# Patient Record
Sex: FEMALE | Race: WHITE | NOT HISPANIC OR LATINO | Employment: FULL TIME | URBAN - METROPOLITAN AREA
[De-identification: names, ages, dates, MRNs, and addresses within clinical notes are randomized per-mention and may not be internally consistent; named-entity substitution may affect disease eponyms.]

---

## 2024-11-11 ENCOUNTER — APPOINTMENT (OUTPATIENT)
Dept: RADIOLOGY | Facility: HOSPITAL | Age: 79
DRG: 038 | End: 2024-11-11
Payer: MEDICARE

## 2024-11-11 ENCOUNTER — HOSPITAL ENCOUNTER (EMERGENCY)
Age: 79
End: 2024-11-11

## 2024-11-11 ENCOUNTER — HOSPITAL ENCOUNTER (INPATIENT)
Facility: HOSPITAL | Age: 79
DRG: 038 | End: 2024-11-11
Attending: EMERGENCY MEDICINE | Admitting: PSYCHIATRY & NEUROLOGY
Payer: MEDICARE

## 2024-11-11 DIAGNOSIS — I63.512 LEFT ACUTE ARTERIAL ISCHEMIC STROKE, MCA (MIDDLE CEREBRAL ARTERY) (MULTI): Primary | ICD-10-CM

## 2024-11-11 DIAGNOSIS — R25.2 SPASTICITY: ICD-10-CM

## 2024-11-11 DIAGNOSIS — I63.512 CEREBROVASCULAR ACCIDENT (CVA) DUE TO OCCLUSION OF LEFT MIDDLE CEREBRAL ARTERY (MULTI): Primary | ICD-10-CM

## 2024-11-11 DIAGNOSIS — I63.512 LEFT ACUTE ARTERIAL ISCHEMIC STROKE, MCA (MIDDLE CEREBRAL ARTERY) (MULTI): ICD-10-CM

## 2024-11-11 DIAGNOSIS — I10 PRIMARY HYPERTENSION: ICD-10-CM

## 2024-11-11 LAB
ALBUMIN SERPL BCP-MCNC: 2.7 G/DL (ref 3.4–5)
ALP SERPL-CCNC: 187 U/L (ref 33–136)
ALT SERPL W P-5'-P-CCNC: 64 U/L (ref 7–45)
ANION GAP BLDV CALCULATED.4IONS-SCNC: 10 MMOL/L (ref 10–25)
ANION GAP SERPL CALC-SCNC: 24 MMOL/L (ref 10–20)
AST SERPL W P-5'-P-CCNC: 53 U/L (ref 9–39)
BASE EXCESS BLDV CALC-SCNC: 4.3 MMOL/L (ref -2–3)
BASOPHILS # BLD AUTO: 0.05 X10*3/UL (ref 0–0.1)
BASOPHILS NFR BLD AUTO: 0.3 %
BILIRUB DIRECT SERPL-MCNC: 1.8 MG/DL (ref 0–0.3)
BILIRUB SERPL-MCNC: 3.7 MG/DL (ref 0–1.2)
BNP SERPL-MCNC: 188 PG/ML (ref 0–99)
BODY TEMPERATURE: 37 DEGREES CELSIUS
BUN SERPL-MCNC: 67 MG/DL (ref 6–23)
CA-I BLDV-SCNC: 1.13 MMOL/L (ref 1.1–1.33)
CALCIUM SERPL-MCNC: 8.9 MG/DL (ref 8.6–10.6)
CHLORIDE BLDV-SCNC: 100 MMOL/L (ref 98–107)
CHLORIDE SERPL-SCNC: 97 MMOL/L (ref 98–107)
CHOLEST SERPL-MCNC: 132 MG/DL (ref 0–199)
CHOLESTEROL/HDL RATIO: 15.2
CO2 SERPL-SCNC: 20 MMOL/L (ref 21–32)
CREAT SERPL-MCNC: 3.8 MG/DL (ref 0.5–1.05)
EGFRCR SERPLBLD CKD-EPI 2021: 11 ML/MIN/1.73M*2
EOSINOPHIL # BLD AUTO: 0.01 X10*3/UL (ref 0–0.4)
EOSINOPHIL NFR BLD AUTO: 0.1 %
ERYTHROCYTE [DISTWIDTH] IN BLOOD BY AUTOMATED COUNT: 13.6 % (ref 11.5–14.5)
EST. AVERAGE GLUCOSE BLD GHB EST-MCNC: 120 MG/DL
GLUCOSE BLD MANUAL STRIP-MCNC: 130 MG/DL (ref 74–99)
GLUCOSE BLDV-MCNC: 147 MG/DL (ref 74–99)
GLUCOSE SERPL-MCNC: 111 MG/DL (ref 74–99)
HBA1C MFR BLD: 5.8 %
HCO3 BLDV-SCNC: 29.8 MMOL/L (ref 22–26)
HCT VFR BLD AUTO: 40.2 % (ref 36–46)
HCT VFR BLD EST: 41 % (ref 36–46)
HDLC SERPL-MCNC: 8.7 MG/DL
HGB BLD-MCNC: 12.9 G/DL (ref 12–16)
HGB BLDV-MCNC: 13.7 G/DL (ref 12–16)
IMM GRANULOCYTES # BLD AUTO: 0.08 X10*3/UL (ref 0–0.5)
IMM GRANULOCYTES NFR BLD AUTO: 0.5 % (ref 0–0.9)
LACTATE BLDV-SCNC: 2.6 MMOL/L (ref 0.4–2)
LDLC SERPL CALC-MCNC: 92 MG/DL
LYMPHOCYTES # BLD AUTO: 0.77 X10*3/UL (ref 0.8–3)
LYMPHOCYTES NFR BLD AUTO: 5.2 %
MAGNESIUM SERPL-MCNC: 2.16 MG/DL (ref 1.6–2.4)
MCH RBC QN AUTO: 29.3 PG (ref 26–34)
MCHC RBC AUTO-ENTMCNC: 32.1 G/DL (ref 32–36)
MCV RBC AUTO: 91 FL (ref 80–100)
MONOCYTES # BLD AUTO: 0.77 X10*3/UL (ref 0.05–0.8)
MONOCYTES NFR BLD AUTO: 5.2 %
NEUTROPHILS # BLD AUTO: 13.03 X10*3/UL (ref 1.6–5.5)
NEUTROPHILS NFR BLD AUTO: 88.7 %
NON HDL CHOLESTEROL: 123 MG/DL (ref 0–149)
NRBC BLD-RTO: 0 /100 WBCS (ref 0–0)
OXYHGB MFR BLDV: 60.1 % (ref 45–75)
PCO2 BLDV: 47 MM HG (ref 41–51)
PH BLDV: 7.41 PH (ref 7.33–7.43)
PHOSPHATE SERPL-MCNC: 6.7 MG/DL (ref 2.5–4.9)
PLATELET # BLD AUTO: 167 X10*3/UL (ref 150–450)
PO2 BLDV: 33 MM HG (ref 35–45)
POTASSIUM BLDV-SCNC: 2.7 MMOL/L (ref 3.5–5.3)
POTASSIUM SERPL-SCNC: 3.7 MMOL/L (ref 3.5–5.3)
PROT SERPL-MCNC: 6.2 G/DL (ref 6.4–8.2)
RBC # BLD AUTO: 4.41 X10*6/UL (ref 4–5.2)
SAO2 % BLDV: 61 % (ref 45–75)
SODIUM BLDV-SCNC: 137 MMOL/L (ref 136–145)
SODIUM SERPL-SCNC: 137 MMOL/L (ref 136–145)
TRIGL SERPL-MCNC: 155 MG/DL (ref 0–149)
VLDL: 31 MG/DL (ref 0–40)
WBC # BLD AUTO: 14.7 X10*3/UL (ref 4.4–11.3)

## 2024-11-11 PROCEDURE — C1887 CATHETER, GUIDING: HCPCS | Performed by: NEUROLOGICAL SURGERY

## 2024-11-11 PROCEDURE — 84100 ASSAY OF PHOSPHORUS: CPT

## 2024-11-11 PROCEDURE — 99222 1ST HOSP IP/OBS MODERATE 55: CPT | Performed by: SURGERY

## 2024-11-11 PROCEDURE — 96374 THER/PROPH/DIAG INJ IV PUSH: CPT | Mod: 59

## 2024-11-11 PROCEDURE — 99152 MOD SED SAME PHYS/QHP 5/>YRS: CPT | Performed by: NEUROLOGICAL SURGERY

## 2024-11-11 PROCEDURE — B31P1ZZ FLUOROSCOPY OF THORACO-ABDOMINAL AORTA USING LOW OSMOLAR CONTRAST: ICD-10-PCS | Performed by: NEUROLOGICAL SURGERY

## 2024-11-11 PROCEDURE — 75625 CONTRAST EXAM ABDOMINL AORTA: CPT | Performed by: NEUROLOGICAL SURGERY

## 2024-11-11 PROCEDURE — C1725 CATH, TRANSLUMIN NON-LASER: HCPCS | Performed by: NEUROLOGICAL SURGERY

## 2024-11-11 PROCEDURE — 70450 CT HEAD/BRAIN W/O DYE: CPT | Performed by: RADIOLOGY

## 2024-11-11 PROCEDURE — 36224 PLACE CATH CAROTD ART: CPT | Performed by: NEUROLOGICAL SURGERY

## 2024-11-11 PROCEDURE — B3171ZZ FLUOROSCOPY OF LEFT INTERNAL CAROTID ARTERY USING LOW OSMOLAR CONTRAST: ICD-10-PCS | Performed by: NEUROLOGICAL SURGERY

## 2024-11-11 PROCEDURE — 99291 CRITICAL CARE FIRST HOUR: CPT

## 2024-11-11 PROCEDURE — B31R1ZZ FLUOROSCOPY OF INTRACRANIAL ARTERIES USING LOW OSMOLAR CONTRAST: ICD-10-PCS | Performed by: NEUROLOGICAL SURGERY

## 2024-11-11 PROCEDURE — 36415 COLL VENOUS BLD VENIPUNCTURE: CPT

## 2024-11-11 PROCEDURE — 36620 INSERTION CATHETER ARTERY: CPT

## 2024-11-11 PROCEDURE — 2550000001 HC RX 255 CONTRASTS: Performed by: NEUROLOGICAL SURGERY

## 2024-11-11 PROCEDURE — 83880 ASSAY OF NATRIURETIC PEPTIDE: CPT

## 2024-11-11 PROCEDURE — 99285 EMERGENCY DEPT VISIT HI MDM: CPT | Mod: 25

## 2024-11-11 PROCEDURE — 96375 TX/PRO/DX INJ NEW DRUG ADDON: CPT | Mod: 59

## 2024-11-11 PROCEDURE — 2500000005 HC RX 250 GENERAL PHARMACY W/O HCPCS: Performed by: REGISTERED NURSE

## 2024-11-11 PROCEDURE — C1760 CLOSURE DEV, VASC: HCPCS | Performed by: NEUROLOGICAL SURGERY

## 2024-11-11 PROCEDURE — 84132 ASSAY OF SERUM POTASSIUM: CPT

## 2024-11-11 PROCEDURE — 80061 LIPID PANEL: CPT

## 2024-11-11 PROCEDURE — 82947 ASSAY GLUCOSE BLOOD QUANT: CPT

## 2024-11-11 PROCEDURE — 2500000004 HC RX 250 GENERAL PHARMACY W/ HCPCS (ALT 636 FOR OP/ED)

## 2024-11-11 PROCEDURE — 70450 CT HEAD/BRAIN W/O DYE: CPT

## 2024-11-11 PROCEDURE — 99285 EMERGENCY DEPT VISIT HI MDM: CPT | Performed by: EMERGENCY MEDICINE

## 2024-11-11 PROCEDURE — 96373 THER/PROPH/DIAG INJ IA: CPT | Performed by: NEUROLOGICAL SURGERY

## 2024-11-11 PROCEDURE — C1769 GUIDE WIRE: HCPCS | Performed by: NEUROLOGICAL SURGERY

## 2024-11-11 PROCEDURE — 2720000007 HC OR 272 NO HCPCS: Performed by: NEUROLOGICAL SURGERY

## 2024-11-11 PROCEDURE — 85025 COMPLETE CBC W/AUTO DIFF WBC: CPT

## 2024-11-11 PROCEDURE — 2500000004 HC RX 250 GENERAL PHARMACY W/ HCPCS (ALT 636 FOR OP/ED): Performed by: REGISTERED NURSE

## 2024-11-11 PROCEDURE — 2500000004 HC RX 250 GENERAL PHARMACY W/ HCPCS (ALT 636 FOR OP/ED): Performed by: NEUROLOGICAL SURGERY

## 2024-11-11 PROCEDURE — 99153 MOD SED SAME PHYS/QHP EA: CPT | Performed by: NEUROLOGICAL SURGERY

## 2024-11-11 PROCEDURE — 75710 ARTERY X-RAYS ARM/LEG: CPT | Mod: RT | Performed by: NEUROLOGICAL SURGERY

## 2024-11-11 PROCEDURE — 82248 BILIRUBIN DIRECT: CPT

## 2024-11-11 PROCEDURE — C1894 INTRO/SHEATH, NON-LASER: HCPCS | Performed by: NEUROLOGICAL SURGERY

## 2024-11-11 PROCEDURE — 83036 HEMOGLOBIN GLYCOSYLATED A1C: CPT

## 2024-11-11 PROCEDURE — 36200 PLACE CATHETER IN AORTA: CPT | Performed by: NEUROLOGICAL SURGERY

## 2024-11-11 PROCEDURE — 83735 ASSAY OF MAGNESIUM: CPT

## 2024-11-11 PROCEDURE — 2020000001 HC ICU ROOM DAILY

## 2024-11-11 PROCEDURE — 99223 1ST HOSP IP/OBS HIGH 75: CPT

## 2024-11-11 RX ORDER — HYDRALAZINE HYDROCHLORIDE 10 MG/1
25 TABLET, FILM COATED ORAL EVERY 6 HOURS PRN
Status: DISCONTINUED | OUTPATIENT
Start: 2024-11-13 | End: 2024-11-11

## 2024-11-11 RX ORDER — SODIUM CHLORIDE 9 MG/ML
100 INJECTION, SOLUTION INTRAVENOUS CONTINUOUS
Status: SHIPPED | OUTPATIENT
Start: 2024-11-11 | End: 2024-11-12

## 2024-11-11 RX ORDER — IPRATROPIUM BROMIDE AND ALBUTEROL SULFATE 2.5; .5 MG/3ML; MG/3ML
3 SOLUTION RESPIRATORY (INHALATION) 4 TIMES DAILY PRN
COMMUNITY

## 2024-11-11 RX ORDER — LABETALOL HYDROCHLORIDE 5 MG/ML
10 INJECTION, SOLUTION INTRAVENOUS EVERY 10 MIN PRN
Status: DISCONTINUED | OUTPATIENT
Start: 2024-11-11 | End: 2024-11-11

## 2024-11-11 RX ORDER — ACETAMINOPHEN 325 MG/1
650 TABLET ORAL EVERY 4 HOURS PRN
Status: DISCONTINUED | OUTPATIENT
Start: 2024-11-11 | End: 2024-11-13

## 2024-11-11 RX ORDER — PHENYLEPHRINE 10 MG/250 ML(40 MCG/ML)IN 0.9 % SOD.CHLORIDE INTRAVENOUS
0-2 CONTINUOUS
Status: DISCONTINUED | OUTPATIENT
Start: 2024-11-11 | End: 2024-11-12

## 2024-11-11 RX ORDER — BUSPIRONE HYDROCHLORIDE 10 MG/1
10 TABLET ORAL 3 TIMES DAILY
COMMUNITY

## 2024-11-11 RX ORDER — PHENYLEPHRINE HCL IN 0.9% NACL 0.4MG/10ML
80 SYRINGE (ML) INTRAVENOUS ONCE
Status: COMPLETED | OUTPATIENT
Start: 2024-11-11 | End: 2024-11-11

## 2024-11-11 RX ORDER — ERGOCALCIFEROL 1.25 MG/1
1.25 CAPSULE ORAL WEEKLY
COMMUNITY

## 2024-11-11 RX ORDER — POLYETHYLENE GLYCOL 3350 17 G/17G
17 POWDER, FOR SOLUTION ORAL DAILY
Status: DISCONTINUED | OUTPATIENT
Start: 2024-11-11 | End: 2024-11-11

## 2024-11-11 RX ORDER — AZITHROMYCIN 250 MG/1
250 TABLET, FILM COATED ORAL
COMMUNITY

## 2024-11-11 RX ORDER — ATORVASTATIN CALCIUM 10 MG/1
40 TABLET, FILM COATED ORAL NIGHTLY
Status: DISCONTINUED | OUTPATIENT
Start: 2024-11-11 | End: 2024-11-11

## 2024-11-11 RX ORDER — LABETALOL HYDROCHLORIDE 5 MG/ML
10 INJECTION, SOLUTION INTRAVENOUS EVERY 10 MIN PRN
Status: DISCONTINUED | OUTPATIENT
Start: 2024-11-11 | End: 2024-11-12

## 2024-11-11 RX ORDER — MIDAZOLAM HYDROCHLORIDE 1 MG/ML
INJECTION INTRAMUSCULAR; INTRAVENOUS
Status: COMPLETED | OUTPATIENT
Start: 2024-11-11 | End: 2024-11-11

## 2024-11-11 RX ORDER — FENTANYL CITRATE 50 UG/ML
INJECTION, SOLUTION INTRAMUSCULAR; INTRAVENOUS
Status: COMPLETED | OUTPATIENT
Start: 2024-11-11 | End: 2024-11-11

## 2024-11-11 RX ORDER — LEVOTHYROXINE SODIUM 25 UG/1
25 TABLET ORAL DAILY
COMMUNITY

## 2024-11-11 RX ORDER — PANTOPRAZOLE SODIUM 40 MG/1
40 TABLET, DELAYED RELEASE ORAL
COMMUNITY

## 2024-11-11 RX ORDER — ATORVASTATIN CALCIUM 10 MG/1
80 TABLET, FILM COATED ORAL NIGHTLY
Status: DISCONTINUED | OUTPATIENT
Start: 2024-11-11 | End: 2024-11-11

## 2024-11-11 RX ORDER — ONDANSETRON HYDROCHLORIDE 2 MG/ML
4 INJECTION, SOLUTION INTRAVENOUS EVERY 8 HOURS PRN
Status: DISCONTINUED | OUTPATIENT
Start: 2024-11-11 | End: 2024-11-22 | Stop reason: HOSPADM

## 2024-11-11 RX ORDER — DEXTROMETHORPHAN HYDROBROMIDE, GUAIFENESIN 5; 100 MG/5ML; MG/5ML
650 LIQUID ORAL EVERY 12 HOURS PRN
COMMUNITY

## 2024-11-11 RX ORDER — ACETAMINOPHEN 160 MG/5ML
650 SOLUTION ORAL EVERY 4 HOURS PRN
Status: DISCONTINUED | OUTPATIENT
Start: 2024-11-11 | End: 2024-11-13

## 2024-11-11 RX ORDER — INSULIN LISPRO 100 [IU]/ML
0-5 INJECTION, SOLUTION INTRAVENOUS; SUBCUTANEOUS
Status: DISCONTINUED | OUTPATIENT
Start: 2024-11-11 | End: 2024-11-11

## 2024-11-11 RX ORDER — CLOPIDOGREL BISULFATE 75 MG/1
75 TABLET ORAL DAILY
COMMUNITY
End: 2024-11-22 | Stop reason: HOSPADM

## 2024-11-11 RX ORDER — DEXTROSE 50 % IN WATER (D50W) INTRAVENOUS SYRINGE
12.5
Status: DISCONTINUED | OUTPATIENT
Start: 2024-11-11 | End: 2024-11-11

## 2024-11-11 RX ORDER — SENNOSIDES 8.6 MG/1
2 TABLET ORAL 2 TIMES DAILY
Status: DISCONTINUED | OUTPATIENT
Start: 2024-11-11 | End: 2024-11-22 | Stop reason: HOSPADM

## 2024-11-11 RX ORDER — ONDANSETRON 4 MG/1
4 TABLET, ORALLY DISINTEGRATING ORAL EVERY 8 HOURS PRN
Status: DISCONTINUED | OUTPATIENT
Start: 2024-11-11 | End: 2024-11-22 | Stop reason: HOSPADM

## 2024-11-11 RX ORDER — SODIUM CHLORIDE 9 MG/ML
50 INJECTION, SOLUTION INTRAVENOUS CONTINUOUS
Status: DISCONTINUED | OUTPATIENT
Start: 2024-11-11 | End: 2024-11-12

## 2024-11-11 RX ORDER — ATORVASTATIN CALCIUM 80 MG/1
80 TABLET, FILM COATED ORAL NIGHTLY
Status: DISCONTINUED | OUTPATIENT
Start: 2024-11-11 | End: 2024-11-22 | Stop reason: HOSPADM

## 2024-11-11 RX ORDER — DEXTROSE 50 % IN WATER (D50W) INTRAVENOUS SYRINGE
12.5
Status: DISCONTINUED | OUTPATIENT
Start: 2024-11-11 | End: 2024-11-22 | Stop reason: HOSPADM

## 2024-11-11 RX ORDER — ACETAMINOPHEN 10 MG/ML
15 INJECTION, SOLUTION INTRAVENOUS ONCE
Status: COMPLETED | OUTPATIENT
Start: 2024-11-11 | End: 2024-11-11

## 2024-11-11 RX ORDER — INSULIN LISPRO 100 [IU]/ML
0-5 INJECTION, SOLUTION INTRAVENOUS; SUBCUTANEOUS
Status: DISCONTINUED | OUTPATIENT
Start: 2024-11-12 | End: 2024-11-22 | Stop reason: HOSPADM

## 2024-11-11 RX ORDER — DEXTROSE 50 % IN WATER (D50W) INTRAVENOUS SYRINGE
25
Status: DISCONTINUED | OUTPATIENT
Start: 2024-11-11 | End: 2024-11-22 | Stop reason: HOSPADM

## 2024-11-11 RX ORDER — HYDRALAZINE HYDROCHLORIDE 50 MG/1
25 TABLET, FILM COATED ORAL EVERY 6 HOURS PRN
Status: DISCONTINUED | OUTPATIENT
Start: 2024-11-13 | End: 2024-11-11

## 2024-11-11 RX ORDER — PHENYLEPHRINE HCL IN 0.9% NACL 0.4MG/10ML
SYRINGE (ML) INTRAVENOUS
Status: COMPLETED
Start: 2024-11-11 | End: 2024-11-11

## 2024-11-11 RX ORDER — ATORVASTATIN CALCIUM 80 MG/1
80 TABLET, FILM COATED ORAL DAILY
COMMUNITY

## 2024-11-11 RX ORDER — HYDRALAZINE HYDROCHLORIDE 50 MG/1
25 TABLET, FILM COATED ORAL EVERY 6 HOURS PRN
Status: DISCONTINUED | OUTPATIENT
Start: 2024-11-13 | End: 2024-11-12

## 2024-11-11 RX ORDER — HYDRALAZINE HYDROCHLORIDE 20 MG/ML
10 INJECTION INTRAMUSCULAR; INTRAVENOUS
Status: DISCONTINUED | OUTPATIENT
Start: 2024-11-11 | End: 2024-11-12

## 2024-11-11 RX ORDER — FUROSEMIDE 20 MG/1
20 TABLET ORAL DAILY
COMMUNITY

## 2024-11-11 RX ORDER — CLONIDINE HYDROCHLORIDE 0.1 MG/1
0.1 TABLET ORAL 2 TIMES DAILY PRN
COMMUNITY

## 2024-11-11 RX ORDER — ASPIRIN 81 MG/1
81 TABLET ORAL DAILY
COMMUNITY

## 2024-11-11 RX ORDER — POLYETHYLENE GLYCOL 3350 17 G/17G
17 POWDER, FOR SOLUTION ORAL DAILY
Status: DISCONTINUED | OUTPATIENT
Start: 2024-11-11 | End: 2024-11-22 | Stop reason: HOSPADM

## 2024-11-11 RX ORDER — LIDOCAINE 560 MG/1
1 PATCH PERCUTANEOUS; TOPICAL; TRANSDERMAL DAILY
Status: DISCONTINUED | OUTPATIENT
Start: 2024-11-11 | End: 2024-11-22 | Stop reason: HOSPADM

## 2024-11-11 RX ORDER — DEXTROSE 50 % IN WATER (D50W) INTRAVENOUS SYRINGE
25
Status: DISCONTINUED | OUTPATIENT
Start: 2024-11-11 | End: 2024-11-11

## 2024-11-11 RX ORDER — TALC
3 POWDER (GRAM) TOPICAL NIGHTLY
COMMUNITY

## 2024-11-11 RX ORDER — HYDRALAZINE HYDROCHLORIDE 20 MG/ML
10 INJECTION INTRAMUSCULAR; INTRAVENOUS
Status: DISCONTINUED | OUTPATIENT
Start: 2024-11-11 | End: 2024-11-11

## 2024-11-11 RX ORDER — PHENYLEPHRINE HCL IN 0.9% NACL 0.4MG/10ML
40 SYRINGE (ML) INTRAVENOUS ONCE
Status: COMPLETED | OUTPATIENT
Start: 2024-11-11 | End: 2024-11-11

## 2024-11-11 RX ORDER — FLUTICASONE FUROATE, UMECLIDINIUM BROMIDE AND VILANTEROL TRIFENATATE 100; 62.5; 25 UG/1; UG/1; UG/1
1 POWDER RESPIRATORY (INHALATION) DAILY
COMMUNITY

## 2024-11-11 RX ADMIN — SODIUM CHLORIDE 50 ML/HR: 9 INJECTION, SOLUTION INTRAVENOUS at 22:01

## 2024-11-11 RX ADMIN — Medication 40 MCG: at 22:39

## 2024-11-11 RX ADMIN — MIDAZOLAM HYDROCHLORIDE 1 MG: 1 INJECTION, SOLUTION INTRAMUSCULAR; INTRAVENOUS at 14:15

## 2024-11-11 RX ADMIN — LIDOCAINE 1 PATCH: 4 PATCH TOPICAL at 21:10

## 2024-11-11 RX ADMIN — MIDAZOLAM HYDROCHLORIDE 1 MG: 1 INJECTION, SOLUTION INTRAMUSCULAR; INTRAVENOUS at 15:31

## 2024-11-11 RX ADMIN — FENTANYL CITRATE 50 MCG: 50 INJECTION, SOLUTION INTRAMUSCULAR; INTRAVENOUS at 15:31

## 2024-11-11 RX ADMIN — FENTANYL CITRATE 50 MCG: 50 INJECTION, SOLUTION INTRAMUSCULAR; INTRAVENOUS at 16:06

## 2024-11-11 RX ADMIN — FENTANYL CITRATE 50 MCG: 50 INJECTION, SOLUTION INTRAMUSCULAR; INTRAVENOUS at 15:10

## 2024-11-11 RX ADMIN — LABETALOL HYDROCHLORIDE 10 MG: 5 INJECTION, SOLUTION INTRAVENOUS at 18:36

## 2024-11-11 RX ADMIN — SODIUM CHLORIDE 1000 ML: 9 INJECTION, SOLUTION INTRAVENOUS at 22:37

## 2024-11-11 RX ADMIN — FENTANYL CITRATE 50 MCG: 50 INJECTION, SOLUTION INTRAMUSCULAR; INTRAVENOUS at 14:15

## 2024-11-11 RX ADMIN — Medication 80 MCG: at 23:45

## 2024-11-11 RX ADMIN — IOHEXOL 100 ML: 350 INJECTION, SOLUTION INTRAVENOUS at 14:28

## 2024-11-11 RX ADMIN — MIDAZOLAM HYDROCHLORIDE 1 MG: 1 INJECTION, SOLUTION INTRAMUSCULAR; INTRAVENOUS at 15:10

## 2024-11-11 RX ADMIN — ACETAMINOPHEN 600 MG: 1000 INJECTION INTRAVENOUS at 22:01

## 2024-11-11 ASSESSMENT — PAIN SCALES - GENERAL
PAINLEVEL_OUTOF10: 0 - NO PAIN
PAINLEVEL_OUTOF10: 4
PAINLEVEL_OUTOF10: 0 - NO PAIN

## 2024-11-11 ASSESSMENT — PAIN DESCRIPTION - DESCRIPTORS: DESCRIPTORS: SORE

## 2024-11-11 ASSESSMENT — PAIN - FUNCTIONAL ASSESSMENT
PAIN_FUNCTIONAL_ASSESSMENT: 0-10
PAIN_FUNCTIONAL_ASSESSMENT: UNABLE TO SELF-REPORT
PAIN_FUNCTIONAL_ASSESSMENT: 0-10
PAIN_FUNCTIONAL_ASSESSMENT: 0-10

## 2024-11-11 NOTE — PROGRESS NOTES
[facility]  NEUROSCIENCE INTENSIVE CARE UNIT  DAILY PROGRESS NOTE       Patient Name: Zulma Rao   MRN: [id number]     Admit Date: 2024     : 1945 AGE: 79 y.o. GENDER: female        Subjective    79 y.o. female with PMH prior R sided stroke with left sided deficit (per personal review of imaging, L sided posterior limb of internal capsule) 2024, bilateral carotid artery stenosis S/P R CEA , HFpEF, NSTEMI, T2DM, HT, DLD, hypothyroidism, COPD, HCC S/P partial hepatectomy (), anxiety, b/l cataracts.  Admitted 2024 with Cerebrovascular accident (CVA) due to occlusion of left middle cerebral artery (Multi) after transferred for mechanical thrombectomy.     History obtained from  Mynor, who reported that pt was home for lunch today, last known well was 12pm. She was standing in the kitchen, and suddenly started having whole body shaking. He was able to catch her before falling to ground. Noticed that she had left sided facial droop that was new and pt was looking to the left, not speaking or communicating with him. He called 911 and EMS brought her to the [facility] ED. NIH on arrival was 20. BP on arrival was:     CTH completed showing dense left MCA sign. CTA head and neck completed showing proximal left M1 occlusion and what appears to be chronic right ICA occlusion. She was given TNK at 12:55. Life flighted to [facility] for MT pamelaal. NIH on arrival was 21 (breakdown below), BP on arrival was 130/93 with BG 93. Mechanical thrombectomy completed and post NIH score was:  Admitted to NSU.      Pt's  reports history of stroke in July of this year for which she originally had left sided weakness and facial droop that completely resolved and was back to her normal baseline prior to stroke. Was able to ambulate independently, but then fell in September and broke her right hip requiring surgical fixation. She was doing well, walking with walker for long distance but  recently has been ambulating independently.  reported no history of arrhythmias and reports pt was taking daily plavix.     NIHSS Pre Thrombectomy  Level of consciousness: 0 = Alert  LOC Question: 2 = Neither correct  LOC Commands: 2 = Obeys neither  Best Gaze: 2 = Forced deviation  Visual Field: 0 = No visual loss  Facial Paresis: 1 = Minor paresis  LUE: 1 = Drift  RUE: 3 = No effort vs gravity  LLE: 1 = Drift  RLE: 3 = No effort vs gravity  Dysarthria: 2 = Unintelligible  Best Language: 3 = Mute  Limb Ataxia: 0 = Absent  Sensory: 1 = Partial loss  Neglect: 0 = None     NIHSS Score: 21        NIHSS Post Thrombectomy:  NIHSS  Level of consciousness: 1 = Drowsy  LOC Question: 2 = Neither correct  LOC Commands: 2 = Obeys neither  Best Gaze: 2 = Forced deviation  Visual Field: 0 = No visual loss  Facial Paresis: 1 = Minor paresis  LUE: 1 = Drift  RUE: 2 = Effort vs gravity  LLE: 2 = Effort vs gravity  RLE: 2 = Effort vs gravity  Dysarthria: 2 = Unintelligible  Best Language: 3 = Mute  Limb Ataxia: 0 = Absent  Sensory: 0 = Absent  Neglect: 0 = None     NIHSS Score: 20    Significant Events:  - 11/11 TNK and spontaneous recanalization on angiogram TICI 3    Interval Events:      Objective   VITALS (24H):  Temp:  [36.3 °C (97.3 °F)] 36.3 °C (97.3 °F)  Heart Rate:  [] 78  Resp:  [13-21] 17  BP: ()/() 159/123  INTAKE/OUTPUT:  Intake/Output Summary (Last 24 hours) at 11/11/2024 2013  Last data filed at 11/11/2024 1900  Gross per 24 hour   Intake --   Output 251 ml   Net -251 ml     VENT SETTINGS:        PHYSICAL EXAM:  NEURO:  - Alert, oriented x2, follows simple commands  - EOS, PERRL, EOMI, VFF  - SANTOS 4/5 strength  CV:  - RRR on telemetry, NSR  - Unable to palpate or doppler pulse in both legs  RESP:  - No signs of resp distress  - Oxygen: Nasal cannula  :  - External catheter in place  GI:  - Abdomen NT/ND, soft  SKIN:  - Intact    11/11/24 @ 5:45pm  NIHSS:   - Level of consciousness: 0 =  Alert  - LOC Question: 2 = Neither correct  - LOC Commands: 0 = Obeys both  - Best Gaze: 0 = Normal  - Visual Field: 0 = No visual loss  - Facial Paresis: 0 = Normal  - LUE: 0 = No drift; 10 sec  - RUE: 0 = No drift; 10 sec  - LLE: 0 = No drift; 5 sec  - RLE: 0 = No drift; 5 sec  - Limb Ataxia: 0 = Absent  - Sensory: 0 = Absent  - Best Language: 1 = Mild-moderate aphasia  - Dysarthria: 1 = Mild-moderate  - Neglect: 0 = None  TOTAL: 4         MEDICATIONS:  Scheduled: PRN: Continuous:   acetaminophen, 15 mg/kg, intravenous, Once  atorvastatin, 80 mg, oral, Nightly  [START ON 11/12/2024] insulin lispro, 0-5 Units, subcutaneous, TID AC  lidocaine, 1 patch, transdermal, Daily  polyethylene glycol, 17 g, oral, Daily  sennosides, 2 tablet, oral, BID     PRN medications: acetaminophen **OR** acetaminophen, dextrose, dextrose, glucagon, glucagon, hydrALAZINE **FOLLOWED BY** [START ON 11/13/2024] hydrALAZINE, labetaloL, ondansetron ODT **OR** ondansetron, oxygen       LAB RESULTS:  Results from last 72 hours   Lab Units 11/11/24  1757   GLUCOSE mg/dL 111*   SODIUM mmol/L 137   POTASSIUM mmol/L 3.7   CHLORIDE mmol/L 97*   CO2 mmol/L 20*   ANION GAP mmol/L 24*   BUN mg/dL 67*   CREATININE mg/dL 3.80*   EGFR mL/min/1.73m*2 11*   CALCIUM mg/dL 8.9   PHOSPHORUS mg/dL 6.7*   ALBUMIN g/dL 2.7*   MAGNESIUM mg/dL 2.16      Results from last 72 hours   Lab Units 11/11/24  1757   WBC AUTO x10*3/uL 14.7*   NRBC AUTO /100 WBCs 0.0   RBC AUTO x10*6/uL 4.41   HEMOGLOBIN g/dL 12.9   HEMATOCRIT % 40.2   MCV fL 91   MCH pg 29.3   MCHC g/dL 32.1   RDW % 13.6   PLATELETS AUTO x10*3/uL 167   NEUTROS PCT AUTO % 88.7   IG PCT AUTO % 0.5   LYMPHS PCT AUTO % 5.2   MONOS PCT AUTO % 5.2   EOS PCT AUTO % 0.1   BASOS PCT AUTO % 0.3   NEUTROS ABS x10*3/uL 13.03*   IG AUTO x10*3/uL 0.08   LYMPHS ABS AUTO x10*3/uL 0.77*   MONOS ABS AUTO x10*3/uL 0.77   EOS ABS AUTO x10*3/uL 0.01   BASOS ABS AUTO x10*3/uL 0.05      Results from last 72 hours   Lab Units  11/11/24 1757   ALK PHOS U/L 187*   BILIRUBIN TOTAL mg/dL 3.7*   BILIRUBIN DIRECT mg/dL 1.8*   PROTEIN TOTAL g/dL 6.2*   ALT U/L 64*   AST U/L 53*   ALBUMIN g/dL 2.7*      IMAGING RESULTS:  Reviewed OSH imaging on PACS  Assessment/Plan    NEURO:  # L MCA stroke S/P TNK, Spontaneous recanalization TICI 3  # R sided stroke with left sided deficit (per personal review of imaging L sided posterior limb of internal capsule) 7/2024  # Bilateral carotid artery stenosis S/P R CEA 2011  Assessment:  - Neurologically: see exam above  - NIHSS on admission   Plan:  - NSU  - Neuro Checks: Q1H  - Sedation: None  - Pain: acetaminophen PRN, Q6H (hold until pass SLP)  - Nausea: ondansetron and None  - PT/OT/SLP  - c/w home Atorvastatin 80mg  - Hold home plavix 75mg daily     CARDIOVASCULAR:  # Loss of pulse in both feet  # NSTEMI  # HFpEF  # HT  # DLD  Assessment:  - EKG: pending  - ECHO (9/5/24 at OSH): LVEF 72, RVSP unable to assess  Plan:  - Continue to monitor on telemetry  - BP goal: BP < 180/105    --> PRN: Labetalol and Hydralazine   - TTE pending  - c/w monitor doppler    RESPIRATORY:  # COPD  Assessment:  - PFT (10/05/2023 at OSH): FEV1 50% predicted. FEV1/FVC 69%. No TLC obtained. DLCO 40%   Plan:  - Continuous pulse oximetry   - O2 PRN to maintain SpO2 > 88%, wean as tolerated  - Incentive spirometry while awake    RENAL/:  #JOSE  Assessment:  - Baseline BUN/Cr: 12/0.63  Results from last 72 hours   Lab Units 11/11/24 1757   BUN mg/dL 67*   CREATININE mg/dL 3.80*       Plan:  - Monitor with daily RFP  - Maintain external urinary diversion device for strict I&O    FEN/GI:  # HCC S/P partial hepatectomy (2019)  Assessment:  - Last BM: PTA  Plan:  - Monitor and replace electrolytes per protocol  - IVF: None  - Diet: NPO   - Bowel Regimen: Docusate-Senna BID and Miralax QD    ENDOCRINE:  # T2DM  # Hypothyroidism  Assessment:  Results from last 7 days   Lab Units 11/11/24 1757 11/11/24  1351   POCT GLUCOSE mg/dL  --  130*    GLUCOSE mg/dL 111*  --    - HbA1C: 6.3 2024   Plan:  - Accuchecks & ISS Q4H   - Hold home metformin  - Hypoglycemia monitoring     HEMATOLOGY:  #JOSE  Assessment:  - Baseline Hgb: 11.6  - Baseline Plts: 258  Results from last 7 days   Lab Units 24  1757   HEMOGLOBIN g/dL 12.9   HEMATOCRIT % 40.2   PLATELETS AUTO x10*3/uL 167     Results from last 72 hours   Lab Units 24  1757   WBC AUTO x10*3/uL 14.7*   NRBC AUTO /100 WBCs 0.0   RBC AUTO x10*6/uL 4.41   HEMOGLOBIN g/dL 12.9   HEMATOCRIT % 40.2   MCV fL 91   MCH pg 29.3   MCHC g/dL 32.1   RDW % 13.6   PLATELETS AUTO x10*3/uL 167      Plan:  - Continue to monitor with daily CBC and Coag panel    INFECTIOUS DISEASE:  #JOSE  Assessment:  Results from last 7 days   Lab Units 24  1757   WBC AUTO x10*3/uL 14.7*    - Temp (24hrs), Av.3 °C (97.3 °F), Min:36.3 °C (97.3 °F), Max:36.3 °C (97.3 °F)     Plan:  - Continue to monitor for s/sx of infection  - Pan culture for temperature > 38.4 C    MUSCULOSKELETAL:  - No acute issues    SKIN:  - No acute issues  - Turns and skin care per NSU protocol    ACCESS:  - PIV    PROPHYLAXIS:  - DVT Ppx: Pharmacological DVT ppx on hold until 24hr after TNK due to risk of bleeding  - GI Ppx: Not indicated    RESTRAINTS:  Not indicated/Patient does not meet criteria for restraints    MARCUS Cardenas-CNP  Neuroscience Intensive Care       Total critical care time of 60 minutes, with > 50% of time spent in direct contact with patient/family for education, counseling and coordination of care.

## 2024-11-11 NOTE — PROCEDURES
Pre-Procedure Verification and Time Out:  Procedure Location: procedure area  HUDDLE - Pre-procedure Verification:  completed  TIME OUT - Final Verification:  completed immediately prior to procedure start  DEBRIEF: completed    Complications:  None; patient tolerated the procedure well.     Disposition: NSU  Condition: stable  Specimens Collected: No specimens collected    General Information:   Anesthesia/ sedation: Non-Anesthesia  Indication(s)/Pre - Procedure Diagnoses: stroke  Post-Procedure Diagnosis: same  Procedure Name: Diagnostic Cerebral Angiogram  Procedure performed by: Dr. Jair Montes De Oca, Dr. Juan David Watts  Assistant(s): Hermila Worrell  Estimated Blood Loss (mL): 10  Specimen: no  Informed Consent: consent obtained and in chart     Last Known Normal:   Access: 8 Fr Sheath R radial artery, 8 Albanian sheath in R CFA  Closure: TR band (radial), Manual pressure (CFA)  Vessels injected: R radial, L CCA, L ICA, R CFA  Groin puncture time: 14:10  Findings: Patent MCA, TICI 3  Initial Thrombectomy Time: n/a  Additional Thrombectomy Times (Times for all device passes): n/a  Revascularization Time: n/a  Mechanical Thrombectomy Device: n/a  TICI Pre: 0  TICI Post: TICI 3  Post-procedure BP parameters: SBP <180  Moderate sedation: 60 min  Full report to follow in PACS.

## 2024-11-11 NOTE — H&P
History Of Present Illness  Zulma Rao is a 79 y.o. female presenting with  past medical history prior right BG stroke 7/2024, HTN, HLD, R CEA 2011, HFpEF, COPD, anxiety, partial hepatectomy for HCC 2019, with acute left MCA territory infarct NIH 21, s/p TNK at [facility], transferred for mechanical thrombectomy and had recanalized with TNK, no tici scoring. Admitted to NSU.      History obtained from  Mynor, who reported that pt was home for lunch today, last known well was 12pm. She was standing in the kitchen, and suddenly started having whole body shaking. He was able to catch her before falling to ground. Noticed that she had left sided facial droop that was new and pt was looking to the left, not speaking or communicating with him. He called 911 and EMS brought her to the [facility] ED. NIH on arrival was 20. BP on arrival was:     CTH completed showing dense left MCA sign. CTA head and neck completed showing proximal left M1 occlusion and what appears to be chronic right ICA occlusion. She was given TNK at 12:55. Life flighted to [facility] for MT ed. NIH on arrival was 21 (breakdown below), BP on arrival was 130/93 with BG 93. Mechanical thrombectomy completed and post NIH score was:  Admitted to NSU.      Pt's  reports history of stroke in July of this year for which she originally had left sided weakness and facial droop that completelyy resolved and was back to her normal baseline prior to stroke. Was able to ambulate independently, but then fell in September and broke her right hip requiring surgical fixation. She was doing well, walking with walker for long distance but recently has been ambulating independently.  reported no history of arrhythmias and reports pt was taking daily plavix.    Last known well: 12pm 11/11  Had stroke symptoms resolved at time of presentation: Yes  Past Medical History  Past Medical History:   Diagnosis Date    Personal history of other  "diseases of the circulatory system     History of hypertension    Personal history of other diseases of the circulatory system     History of carotid artery stenosis    Personal history of other diseases of the nervous system and sense organs     History of cataract    Personal history of other endocrine, nutritional and metabolic disease     History of hyperlipidemia     Surgical History  Past Surgical History:   Procedure Laterality Date    OTHER SURGICAL HISTORY  12/03/2018    Eye surgery    OTHER SURGICAL HISTORY  12/03/2018    Foot surgery    OTHER SURGICAL HISTORY  12/03/2018    Cataract surgery    OTHER SURGICAL HISTORY  12/03/2018    Carotid thromboendarterectomy    OTHER SURGICAL HISTORY  12/03/2018    Tonsillectomy     Social History     Allergies  Amoxicillin, Avelox [moxifloxacin], Doxycycline, Sulfa (sulfonamide antibiotics), and Vicodin [hydrocodone-acetaminophen]  Home Medications  No medications prior to admission.       Review of Systems  Neurological Exam  Physical Exam  On arrival Pt alert, with left forced gaze deviation  Blinks to threat bilaterally in all quadrants.   Mute, not following commands, not mirroring.   No effort vs gravity of RU/RLE spontaneously moving left upper and left lower extremity.   No clonus, Urrutia reflex, downgoing plantar reflexes bilaterally.     Last Recorded Vitals  Blood pressure 179/78, pulse 98, temperature 36.3 °C (97.3 °F), resp. rate 16, height 1.626 m (5' 4\"), weight 49.8 kg (109 lb 12.6 oz), SpO2 100%.        Relevant Results  Scheduled medications  atorvastatin, 80 mg, oral, Nightly  [START ON 11/12/2024] insulin lispro, 0-5 Units, subcutaneous, TID AC  polyethylene glycol, 17 g, oral, Daily  sennosides, 2 tablet, oral, BID      Continuous medications     PRN medications  PRN medications: dextrose, dextrose, glucagon, glucagon, hydrALAZINE **FOLLOWED BY** [START ON 11/13/2024] hydrALAZINE, labetaloL, oxygen                         mRS: 0    NIHSS Pre " Thrombectomy  Level of consciousness: 0 = Alert  LOC Question: 2 = Neither correct  LOC Commands: 2 = Obeys neither  Best Gaze: 2 = Forced deviation  Visual Field: 0 = No visual loss  Facial Paresis: 1 = Minor paresis  LUE: 1 = Drift  RUE: 3 = No effort vs gravity  LLE: 1 = Drift  RLE: 3 = No effort vs gravity  Dysarthria: 2 = Unintelligible  Best Language: 3 = Mute  Limb Ataxia: 0 = Absent  Sensory: 1 = Partial loss  Neglect: 0 = None    NIHSS Score: 21      NIHSS Post Thrombectomy:  NIHSS  Level of consciousness: 1 = Drowsy  LOC Question: 2 = Neither correct  LOC Commands: 2 = Obeys neither  Best Gaze: 2 = Forced deviation  Visual Field: 0 = No visual loss  Facial Paresis: 1 = Minor paresis  LUE: 1 = Drift  RUE: 2 = Effort vs gravity  LLE: 2 = Effort vs gravity  RLE: 2 = Effort vs gravity  Dysarthria: 2 = Unintelligible  Best Language: 3 = Mute  Limb Ataxia: 0 = Absent  Sensory: 0 = Absent  Neglect: 0 = None    NIHSS Score: 20            [place] Coma Scale  Best Eye Response: Spontaneous  Best Verbal Response: None  Best Motor Response: Follows commands  Jim Coma Scale Score: 11                I have personally reviewed the following imaging results XR hip right with pelvis when performed 2 or 3 views    Result Date: 10/28/2024  AP and lateral views of the right hip were obtained.  The 3 screws crossing the femoral neck fracture site are intact no signs of loosening or failure.  Interval bone healing is noted at the femoral neck fracture site.  No other abnormalities are noted.  .     Stroke Alert CT/MRI review: Actual date and time 11/11 1300      IV Thrombolysis IV Thrombolysis Checklist      IV Thrombolysis Given: Yes Thrombolysis Administration: prior to arrival       Assessment/Plan   Assessment & Plan  Cerebrovascular accident (CVA) due to occlusion of left middle cerebral artery (Multi)    Ms. Rao is an 78 yo female with history of stroke in 7/2024 HTN, HLD, R CEA 2011, HFpEF, COPD, anxiety,  partial hepatectomy for HCC 2019, mRS 0,  transferred from OSH for acute left MCA infarct with NIH 21, s/p TNK. MT performed, but patient's left had already recanalized with TNK (therefore cannot use Tici Scoring). Thrombectomy complicated by severe diffuse atherosclerosis of vasculature, requiring multiple entry attempts/passes. Access gained via right radial artery. NIH 20 s/p thrombectomy with RUE/RLE antigravity, improved from prior. Admitting to NSU for continued monitoring. BP goal <180.     Type: Ischemic stroke  Subtype/etiology: Artery to artery  Vessels involved: left MCA  Neurological manifestations:  NIHSS (worst at presentation): 21   Antiplatelet/antithrombotic plan for stroke prevention: holding for now  VTE prophylaxis: SCDS  Vascular Risk Factor modification goals:  Blood pressure goals: avoid hypotension SBP <100 that could worsen cerebral perfusion, Ischemic stroke post-thrombectomy   Lipid Goals: education on healthy diet and statin therapy to maintain or achieve goal LDL-cholesterol < 70mg  Glucose Goals: early treatment of hyperglycemia to goal glucose 140-180 mg/dl with long-term goal A1c < 7%   Smoking Cessation and Education  Assessment for Rehabilitation needs   Patient and family education on signs and symptoms of stroke, calling 911, healthy strategies for stroke prevention.       NEUROLOGICAL  # Acute Left MCA infarct s/p TNK and MT (already recanalized- no Tici scoring)  Assessment:  - Neurologically: Mute, forced left gaze deviation, now RUE/RLE antigravity  Plan:  - Q1 neuro checks  - SBP goal <180/105mm Hg  - Stroke: atorvastatin 80mg, keep SBP <180 systolic  - Pain and Sedation: acetaminophen PRN  - PT/OT, SLP  - Hold home plavix 75mg daily, discuss re-initiation of plavix     CARDIOVASCULAR  #HTN  #History of NSTEMI/HFpEF  #Trauma of Right radial artery during MT  Assessment:  - Cardiac Rhythm monitoring on tele  - Echo pending  Plan:  - Tele monitoring  - SBP<180, PRN  labetolol/hydralazine, Cardene as needed.   -Vascular surgery consulted per neurosurgery  -Hold home lasix 20mg       RESPIRATORY   #COPD not on oxygen at home  Assessment: satting well on room air  :: PFT (10/05/2023 at OSH): FEV1 50% predicted. FEV1/FVC 69%. No TLC obtained. DLCO 40%   :: Oxygen therapy: PRN via NC  Plan:  - Wean O2 as tolerated, incentive spirometry as able, maintain SPO2  88-92%  -Appreciate RT recs  -Can restart home PRN albuterol, Breo-ellipta as needed per NSU team     GI:  # HCC S/P partial hepatectomy (2019)   Assessment:  - Last BM: PTA  Plan:  - Bowel regimen: polyethylene glycol, doc senna  - Diet: NPO until swallow eval     F/E/N:  #Hypokalemia  #Hypercarbia  Assessment:  - IVF NS continuous while NPO  Plan:  - Continue IVF  - Replace electrolytes per ICU protocol     RENAL:  #No active issues  Assessment:  - Admission BUN/Creatinine: BUN/Cr: 12/0.63   Plan:  - Continue daily RFP  - External urinary catheter     ENDO:  # Diabetes mellitus type 2  #Hypothyroidism  Assessment:  - BS:   -Hga1C: 6.3 9/2024  Plan:  -Hold home metformin  - Mild insulin sliding scale  - Hypoglycemia monitoring   - Hold home levothyroxine 25mg, in setting of NPO, restart once either cleared for swallow or NG tube placed     HEMATOLOGY:  #No active issues  Assessment:  - Baseline H/H: 11.8 9/2024  - Baseline platelets: 255 9/2024  Plan:  - Daily CBC       INFECTIOUS DISEASE  #No active issues  Plan:  - Continue Daily CBC  - Continue ABX coverage; f/u ID recs   - Pan culture for Temp>38.4C     Skin/Musculoskeletal  #No active issues     LINES:  - peripheral IV     PROPHYLAXIS:  - DVT: SCDs , no subcutaneous heparin in setting of TNK     CODE STATUS: Assumed full code, pending family discussion on code status       Type: Ischemic stroke  Subtype/etiology: Artery to artery atheroembolism  Vessels involved: left MCA  Neurological manifestations:  NIHSS (worst at presentation): 21   Diagnostic evaluation: s/p CTH,CTA  head and neck  Antiplatelet/antithrombotic plan for stroke prevention: holding in setting of TNK administration (pt on daily plavix at home)  VTE prophylaxis: hold  Vascular Risk Factor modification goals:  Blood pressure goals: avoid hypotension SBP <100 that could worsen cerebral perfusion, Ischemic stroke post-thrombolysis- BP < 180/105 mmHg for 24hr  Lipid Goals: education on healthy diet and statin therapy to maintain or achieve goal LDL-cholesterol < 70mg  Glucose Goals: early treatment of hyperglycemia to goal glucose 140-180 mg/dl with long-term goal A1c < 7%   Smoking Cessation and Education  Assessment for Rehabilitation needs   Patient and family education on signs and symptoms of stroke, calling 911, healthy strategies for stroke prevention.             Felipa Swift MD

## 2024-11-11 NOTE — ED PROVIDER NOTES
"History of Present Illness     History provided by: {History Provided By:56336::\"Patient\"}  Limitations to History: {History Limitations:98525::\"None\"}  External Records Reviewed with Brief Summary: {ED External Records Reviewed with Brief Summary:23316::\"None\"}    HPI:  Zulma Rao is a 79 y.o. female ***    Physical Exam   Triage vitals:  T    HR (!) 104  BP (!) 138/93  RR 16  O2 94 % None (Room air)    General: Awake, alert, in no acute distress, non-toxic appearing  Eyes: Gaze conjugate.  No scleral icterus or injection  HENT: Normo-cephalic, atraumatic. No stridor. External auditory canals without erythema or drainage.  TM's normal in appearance bilaterally without erythema, or bulging  CV: {HR:43901::\"Regular\"} rate, {rhythm:57293::\"regular\"} rhythm. No MRG. Cap refill less than 2 seconds  Resp: Breathing non-labored, clear to auscultation bilaterally, no accessory muscle use, no grunting, nasal flaring, retractions, or tugging.  GI: Soft, non-distended, non-tender. No rebound or guarding.  : ***  MSK/Extremities: No gross bony deformities. Moving all extremities  Skin: Warm. Appropriate color  Neuro: Awake and Alert. Face symmetric. Appropriate tone. Moving all extremities equally.    Medical Decision Making & ED Course   Medical Decision Makin y.o. female ***  ----  {Scoring Tools Utilized:39416}    Differential diagnoses considered include but are not limited to: ***     Social Determinants of Health which Significantly Impact Care: {Social Determinants of Health which Significantly Impact Care:80363::\"None identified\"} {The following actions were taken to address these social determinants:32137}    EKG Independent Interpretation: See ED course for my independent interpretation if ECG was obtained.    Independent Result Review and Interpretation: Please see MDM and ED course for my independent interpretation of the results    Chronic conditions affecting the patient's care: Please see H&P " "and [facility]    The patient was discussed with the following consultants/services: {The patient was discussed with the following consultants/services:96765::\"None\"}    Care Considerations: As document above in [facility]    ED Course:  ED Course as of 11/11/24 1405   Mon Nov 11, 2024   1401 Seen and evaluated upon arrival by EMS to room 3. Pt presented to the OSH with R sided deficits and change in speech. Received TNK at 1205 and tolerated it well. Upon arrival NIH 21 due to R sided deficits, facial droop, mute, and drift in her LLE with flaccid R sided deficits. Reviewed with stroke attending, taken to thrombectomy suite for possible intervention  [LP]      ED Course User Index  [LP] Georgina Vargas DO     Disposition   {ED Disposition:80660}    Procedures   Procedures    Patient seen and discussed with attending physician    Tamela Griggs, DO  Emergency Medicine  "

## 2024-11-11 NOTE — ED PROVIDER NOTES
Emergency Department Provider Note        History of Present Illness     History provided by: EMS  Limitations to History: Dysarthria and Patient Acuity    HPI:  Zulma Rao is a 79 y.o. female history of prior CVA, hypertension, HLD, HFpEF, COPD presenting to the ED as transfer from OSH for neurology evaluation in the setting of acute left MCA territory infarct, s/p TNK at [facility] transferred for mechanical thrombectomy.  Patient LKN approximately 12 PM, and when she had onset of difficulty with speech, left facial droop and right greater than left weakness.  Patient unable to communicate, intermittently follows command.        Last Known Well Time: 12 pm     Physical Exam   Triage vitals:  T 36.3 °C (97.3 °F)  HR (!) 104  BP (!) 138/93  RR 16  O2 94 % None (Room air)    General: Awake, alert  Eyes: No scleral icterus or injection  HENT: Normo-cephalic, atraumatic. No stridor.  CV: Regular rate, regular rhythm. Radial pulses 2+ bilaterally  Resp: Breathing non-labored  GI: Soft, non-distended, non-tender. No rebound or guarding.  MSK/Extremities: No gross bony deformities.   Skin: Warm. Appropriate color  Neuro: see below for NIHSS    NIH Stroke Scale  Person Administering Scale: Tamela Griggs,     1a  Level of consciousness: 0=alert; keenly responsive   1b. LOC questions:  2=Performs neither task correctly   1c. LOC commands: 2=Performs neither task correctly   2.  Best Gaze: 2=forced deviation, or total gaze paresis not overcome by oculocephalic maneuver   3. Visual: 0=No visual loss   4. Facial Palsy: 1=Minor paralysis (flattened nasolabial fold, asymmetric on smiling)   5a. Motor left arm: 1=Drift, limb holds 90 (or 45) degrees but drifts down before full 10 seconds: does not hit bed   5b.  Motor right arm: 3=No effort against gravity, limb falls   6a. Motor left le=Drift, limb holds 90 (or 45) degrees but drifts down before full 10 seconds: does not hit bed   6b  Motor right leg:  3=No  effort against gravity, limb falls   7. Limb Ataxia: 0=Absent   8.  Sensory: 1=Mild to moderate sensory loss; patient feels pinprick is less sharp or is dull on the affected side; there is a loss of superficial pain with pinprick but patient is aware She is being touched   9. Best Language:  3=Mute, global aphasia; no usable speech or auditory comprehension   10. Dysarthria: 2=Severe; patient speech is so slurred as to be unintelligible in the absence of or our of proportion to any dysphagia, or is mute/anarthric   11. Extinction and Inattention: 0=No abnormality     Total:   21     VAN: Positive    Medical Decision Making & ED Course   Medical Decision Makin y.o. female presenting as transfer from OSH for neurology evaluation and consideration of thrombectomy.  Patient presenting with acute onset speech difficulty, left facial droop and right greater than left weakness, s/p TNK at OSH and transferred here for mechanical thrombectomy. Workup at outside hospital showed CT head showing dense left MCA sign, CTA showing proximal left M1 occlusion and chronic right ICA occlusion, given TNK at 1255, life flighted here for emergent neurology evaluation, NIH 21 on arrival, BP controlled.  Initially with some concerns for elevated BP on transfer reports however on ED arrival patient's BP was controlled and did not require any antihypertensives.  Protecting airway.  Admitted emergently to the [facility] plan for thrombectomy.  ----      Social Determinants of Health which Significantly Impact Care: None identified     EKG Independent Interpretation: EKG not obtained    Independent Result Review and Interpretation: Relevant laboratory and radiographic results were reviewed and independently interpreted by myself.  As necessary, they are commented on in the ED Course.    Chronic conditions affecting the patient's care: As documented above in MDM    The patient was discussed with the following consultants/services:   Neurology    Care Considerations: As documented above in MDM    IV Thrombolysis IV Thrombolysis Checklist        IV Thrombolysis Given: Yes Thrombolysis Administration: prior to arrival      ED Course:  ED Course as of 11/11/24 1639   Mon Nov 11, 2024   1401 Seen and evaluated upon arrival by EMS to room 3. Pt presented to the OSH with R sided deficits and change in speech. Received TNK at 1205 and tolerated it well. Upon arrival NIH 21 due to R sided deficits, facial droop, mute, and drift in her LLE with flaccid R sided deficits. Reviewed with stroke attending, taken to thrombectomy suite for possible intervention  [LP]      ED Course User Index  [LP] Georgina Vargas DO         Diagnoses as of 11/11/24 1639   Cerebrovascular accident (CVA) due to occlusion of left middle cerebral artery (Multi)       Disposition   As a result of their workup, the patient will require admission to the hospital.  The patient was informed of her diagnosis.  The patient was given the opportunity to ask questions and I answered them. The patient agreed to be admitted to the hospital.    Procedures   Procedures        Tamela Griggs DO  Emergency Medicine     Tamela Griggs DO  Resident  11/11/24 4785

## 2024-11-12 ENCOUNTER — APPOINTMENT (OUTPATIENT)
Dept: RADIOLOGY | Facility: HOSPITAL | Age: 79
DRG: 038 | End: 2024-11-12
Payer: MEDICARE

## 2024-11-12 ENCOUNTER — APPOINTMENT (OUTPATIENT)
Dept: CARDIOLOGY | Facility: HOSPITAL | Age: 79
DRG: 038 | End: 2024-11-12
Payer: MEDICARE

## 2024-11-12 LAB
ALBUMIN SERPL BCP-MCNC: 3.2 G/DL (ref 3.4–5)
ALBUMIN SERPL BCP-MCNC: 3.4 G/DL (ref 3.4–5)
ANION GAP SERPL CALC-SCNC: 11 MMOL/L (ref 10–20)
ANION GAP SERPL CALC-SCNC: 16 MMOL/L (ref 10–20)
BASOPHILS # BLD AUTO: 0.01 X10*3/UL (ref 0–0.1)
BASOPHILS NFR BLD AUTO: 0.1 %
BUN SERPL-MCNC: 3 MG/DL (ref 6–23)
BUN SERPL-MCNC: 5 MG/DL (ref 6–23)
CA-I BLD-SCNC: 1.02 MMOL/L (ref 1.1–1.33)
CALCIUM SERPL-MCNC: 7.3 MG/DL (ref 8.6–10.6)
CALCIUM SERPL-MCNC: 7.6 MG/DL (ref 8.6–10.6)
CARDIAC TROPONIN I PNL SERPL HS: 83 NG/L (ref 0–34)
CHLORIDE SERPL-SCNC: 104 MMOL/L (ref 98–107)
CHLORIDE SERPL-SCNC: 106 MMOL/L (ref 98–107)
CO2 SERPL-SCNC: 24 MMOL/L (ref 21–32)
CO2 SERPL-SCNC: 27 MMOL/L (ref 21–32)
CREAT SERPL-MCNC: 0.39 MG/DL (ref 0.5–1.05)
CREAT SERPL-MCNC: 0.47 MG/DL (ref 0.5–1.05)
EGFRCR SERPLBLD CKD-EPI 2021: >90 ML/MIN/1.73M*2
EGFRCR SERPLBLD CKD-EPI 2021: >90 ML/MIN/1.73M*2
EOSINOPHIL # BLD AUTO: 0 X10*3/UL (ref 0–0.4)
EOSINOPHIL NFR BLD AUTO: 0 %
ERYTHROCYTE [DISTWIDTH] IN BLOOD BY AUTOMATED COUNT: 13.3 % (ref 11.5–14.5)
GLUCOSE BLD MANUAL STRIP-MCNC: 118 MG/DL (ref 74–99)
GLUCOSE BLD MANUAL STRIP-MCNC: 137 MG/DL (ref 74–99)
GLUCOSE BLD MANUAL STRIP-MCNC: 189 MG/DL (ref 74–99)
GLUCOSE BLD MANUAL STRIP-MCNC: 89 MG/DL (ref 74–99)
GLUCOSE SERPL-MCNC: 104 MG/DL (ref 74–99)
GLUCOSE SERPL-MCNC: 152 MG/DL (ref 74–99)
HCT VFR BLD AUTO: 35 % (ref 36–46)
HGB BLD-MCNC: 11.7 G/DL (ref 12–16)
IMM GRANULOCYTES # BLD AUTO: 0.03 X10*3/UL (ref 0–0.5)
IMM GRANULOCYTES NFR BLD AUTO: 0.3 % (ref 0–0.9)
LYMPHOCYTES # BLD AUTO: 0.55 X10*3/UL (ref 0.8–3)
LYMPHOCYTES NFR BLD AUTO: 5.2 %
MAGNESIUM SERPL-MCNC: 1.65 MG/DL (ref 1.6–2.4)
MCH RBC QN AUTO: 29.7 PG (ref 26–34)
MCHC RBC AUTO-ENTMCNC: 33.4 G/DL (ref 32–36)
MCV RBC AUTO: 89 FL (ref 80–100)
MONOCYTES # BLD AUTO: 0.42 X10*3/UL (ref 0.05–0.8)
MONOCYTES NFR BLD AUTO: 4 %
NEUTROPHILS # BLD AUTO: 9.54 X10*3/UL (ref 1.6–5.5)
NEUTROPHILS NFR BLD AUTO: 90.4 %
NRBC BLD-RTO: 0 /100 WBCS (ref 0–0)
PHOSPHATE SERPL-MCNC: 2.3 MG/DL (ref 2.5–4.9)
PHOSPHATE SERPL-MCNC: 3.2 MG/DL (ref 2.5–4.9)
PLATELET # BLD AUTO: 165 X10*3/UL (ref 150–450)
POTASSIUM SERPL-SCNC: 3 MMOL/L (ref 3.5–5.3)
POTASSIUM SERPL-SCNC: 3.1 MMOL/L (ref 3.5–5.3)
RBC # BLD AUTO: 3.94 X10*6/UL (ref 4–5.2)
SODIUM SERPL-SCNC: 141 MMOL/L (ref 136–145)
SODIUM SERPL-SCNC: 141 MMOL/L (ref 136–145)
WBC # BLD AUTO: 10.6 X10*3/UL (ref 4.4–11.3)

## 2024-11-12 PROCEDURE — 2500000005 HC RX 250 GENERAL PHARMACY W/O HCPCS

## 2024-11-12 PROCEDURE — 2020000001 HC ICU ROOM DAILY

## 2024-11-12 PROCEDURE — 2500000001 HC RX 250 WO HCPCS SELF ADMINISTERED DRUGS (ALT 637 FOR MEDICARE OP)

## 2024-11-12 PROCEDURE — 2500000004 HC RX 250 GENERAL PHARMACY W/ HCPCS (ALT 636 FOR OP/ED): Performed by: REGISTERED NURSE

## 2024-11-12 PROCEDURE — C1751 CATH, INF, PER/CENT/MIDLINE: HCPCS

## 2024-11-12 PROCEDURE — 2550000001 HC RX 255 CONTRASTS: Performed by: PSYCHIATRY & NEUROLOGY

## 2024-11-12 PROCEDURE — 99231 SBSQ HOSP IP/OBS SF/LOW 25: CPT | Performed by: SURGERY

## 2024-11-12 PROCEDURE — 93306 TTE W/DOPPLER COMPLETE: CPT | Performed by: INTERNAL MEDICINE

## 2024-11-12 PROCEDURE — 92523 SPEECH SOUND LANG COMPREHEN: CPT | Mod: GN

## 2024-11-12 PROCEDURE — C9786 TRANSTHORACIC ECHO (TTE) COMPLETE: HCPCS

## 2024-11-12 PROCEDURE — 85025 COMPLETE CBC W/AUTO DIFF WBC: CPT

## 2024-11-12 PROCEDURE — 02HV33Z INSERTION OF INFUSION DEVICE INTO SUPERIOR VENA CAVA, PERCUTANEOUS APPROACH: ICD-10-PCS | Performed by: PSYCHIATRY & NEUROLOGY

## 2024-11-12 PROCEDURE — 83735 ASSAY OF MAGNESIUM: CPT

## 2024-11-12 PROCEDURE — 80069 RENAL FUNCTION PANEL: CPT

## 2024-11-12 PROCEDURE — 92610 EVALUATE SWALLOWING FUNCTION: CPT | Mod: GN

## 2024-11-12 PROCEDURE — 75635 CT ANGIO ABDOMINAL ARTERIES: CPT | Performed by: RADIOLOGY

## 2024-11-12 PROCEDURE — 94640 AIRWAY INHALATION TREATMENT: CPT

## 2024-11-12 PROCEDURE — 87081 CULTURE SCREEN ONLY: CPT

## 2024-11-12 PROCEDURE — 4A10X4Z MONITORING OF CENTRAL NERVOUS ELECTRICAL ACTIVITY, EXTERNAL APPROACH: ICD-10-PCS | Performed by: NEUROLOGICAL SURGERY

## 2024-11-12 PROCEDURE — 82330 ASSAY OF CALCIUM: CPT

## 2024-11-12 PROCEDURE — 37799 UNLISTED PX VASCULAR SURGERY: CPT

## 2024-11-12 PROCEDURE — 84484 ASSAY OF TROPONIN QUANT: CPT

## 2024-11-12 PROCEDURE — C8929 TTE W OR WO FOL WCON,DOPPLER: HCPCS

## 2024-11-12 PROCEDURE — 2500000004 HC RX 250 GENERAL PHARMACY W/ HCPCS (ALT 636 FOR OP/ED)

## 2024-11-12 PROCEDURE — 70450 CT HEAD/BRAIN W/O DYE: CPT

## 2024-11-12 PROCEDURE — 2500000005 HC RX 250 GENERAL PHARMACY W/O HCPCS: Performed by: REGISTERED NURSE

## 2024-11-12 PROCEDURE — 2780000003 HC OR 278 NO HCPCS

## 2024-11-12 PROCEDURE — 99291 CRITICAL CARE FIRST HOUR: CPT | Performed by: NEUROLOGICAL SURGERY

## 2024-11-12 PROCEDURE — 70498 CT ANGIOGRAPHY NECK: CPT

## 2024-11-12 PROCEDURE — 82947 ASSAY GLUCOSE BLOOD QUANT: CPT

## 2024-11-12 PROCEDURE — XXE2X19 MEASUREMENT OF CARDIAC OUTPUT, COMPUTER-AIDED ASSESSMENT, NEW TECHNOLOGY GROUP 9: ICD-10-PCS | Performed by: INTERNAL MEDICINE

## 2024-11-12 PROCEDURE — 2500000004 HC RX 250 GENERAL PHARMACY W/ HCPCS (ALT 636 FOR OP/ED): Mod: JZ

## 2024-11-12 PROCEDURE — 36573 INSJ PICC RS&I 5 YR+: CPT

## 2024-11-12 PROCEDURE — 75635 CT ANGIO ABDOMINAL ARTERIES: CPT

## 2024-11-12 RX ORDER — CALCIUM GLUCONATE 20 MG/ML
1 INJECTION, SOLUTION INTRAVENOUS EVERY 6 HOURS PRN
Status: DISCONTINUED | OUTPATIENT
Start: 2024-11-12 | End: 2024-11-22 | Stop reason: HOSPADM

## 2024-11-12 RX ORDER — POTASSIUM CHLORIDE 14.9 MG/ML
20 INJECTION INTRAVENOUS
Status: DISPENSED | OUTPATIENT
Start: 2024-11-12 | End: 2024-11-12

## 2024-11-12 RX ORDER — LABETALOL HYDROCHLORIDE 5 MG/ML
10 INJECTION, SOLUTION INTRAVENOUS EVERY 10 MIN PRN
Status: DISCONTINUED | OUTPATIENT
Start: 2024-11-12 | End: 2024-11-12

## 2024-11-12 RX ORDER — POTASSIUM CHLORIDE 14.9 MG/ML
20 INJECTION INTRAVENOUS EVERY 6 HOURS PRN
Status: DISCONTINUED | OUTPATIENT
Start: 2024-11-12 | End: 2024-11-22 | Stop reason: HOSPADM

## 2024-11-12 RX ORDER — BUSPIRONE HYDROCHLORIDE 10 MG/1
10 TABLET ORAL 3 TIMES DAILY
Status: DISCONTINUED | OUTPATIENT
Start: 2024-11-12 | End: 2024-11-22 | Stop reason: HOSPADM

## 2024-11-12 RX ORDER — LABETALOL HYDROCHLORIDE 5 MG/ML
10 INJECTION, SOLUTION INTRAVENOUS EVERY 10 MIN PRN
Status: DISCONTINUED | OUTPATIENT
Start: 2024-11-12 | End: 2024-11-13

## 2024-11-12 RX ORDER — TALC
3 POWDER (GRAM) TOPICAL NIGHTLY
Status: DISCONTINUED | OUTPATIENT
Start: 2024-11-12 | End: 2024-11-22 | Stop reason: HOSPADM

## 2024-11-12 RX ORDER — MAGNESIUM SULFATE HEPTAHYDRATE 40 MG/ML
4 INJECTION, SOLUTION INTRAVENOUS ONCE
Status: DISCONTINUED | OUTPATIENT
Start: 2024-11-12 | End: 2024-11-13

## 2024-11-12 RX ORDER — NOREPINEPHRINE BITARTRATE/D5W 8 MG/250ML
.01-1 PLASTIC BAG, INJECTION (ML) INTRAVENOUS CONTINUOUS
Status: DISCONTINUED | OUTPATIENT
Start: 2024-11-12 | End: 2024-11-12

## 2024-11-12 RX ORDER — POTASSIUM CHLORIDE 29.8 MG/ML
40 INJECTION INTRAVENOUS ONCE
Status: COMPLETED | OUTPATIENT
Start: 2024-11-12 | End: 2024-11-12

## 2024-11-12 RX ORDER — POTASSIUM CHLORIDE 20 MEQ/1
20 TABLET, EXTENDED RELEASE ORAL EVERY 6 HOURS PRN
Status: DISCONTINUED | OUTPATIENT
Start: 2024-11-12 | End: 2024-11-22 | Stop reason: HOSPADM

## 2024-11-12 RX ORDER — PHENYLEPHRINE HYDROCHLORIDE 10 MG/ML
INJECTION INTRAVENOUS
Status: DISPENSED
Start: 2024-11-12 | End: 2024-11-12

## 2024-11-12 RX ORDER — HYDRALAZINE HYDROCHLORIDE 50 MG/1
25 TABLET, FILM COATED ORAL EVERY 6 HOURS PRN
Status: DISCONTINUED | OUTPATIENT
Start: 2024-11-13 | End: 2024-11-12

## 2024-11-12 RX ORDER — ESOMEPRAZOLE MAGNESIUM 40 MG/1
40 GRANULE, DELAYED RELEASE ORAL
Status: DISCONTINUED | OUTPATIENT
Start: 2024-11-13 | End: 2024-11-22 | Stop reason: HOSPADM

## 2024-11-12 RX ORDER — LIDOCAINE HYDROCHLORIDE 10 MG/ML
5 INJECTION, SOLUTION INFILTRATION; PERINEURAL ONCE
Status: DISCONTINUED | OUTPATIENT
Start: 2024-11-12 | End: 2024-11-13

## 2024-11-12 RX ORDER — PANTOPRAZOLE SODIUM 40 MG/1
40 TABLET, DELAYED RELEASE ORAL
Status: DISCONTINUED | OUTPATIENT
Start: 2024-11-12 | End: 2024-11-12

## 2024-11-12 RX ORDER — LEVOTHYROXINE SODIUM 25 UG/1
25 TABLET ORAL DAILY
Status: DISCONTINUED | OUTPATIENT
Start: 2024-11-12 | End: 2024-11-22 | Stop reason: HOSPADM

## 2024-11-12 RX ORDER — FLUTICASONE FUROATE AND VILANTEROL 100; 25 UG/1; UG/1
1 POWDER RESPIRATORY (INHALATION)
Status: DISCONTINUED | OUTPATIENT
Start: 2024-11-12 | End: 2024-11-22 | Stop reason: HOSPADM

## 2024-11-12 RX ORDER — MAGNESIUM SULFATE HEPTAHYDRATE 40 MG/ML
2 INJECTION, SOLUTION INTRAVENOUS EVERY 6 HOURS PRN
Status: DISCONTINUED | OUTPATIENT
Start: 2024-11-12 | End: 2024-11-22 | Stop reason: HOSPADM

## 2024-11-12 RX ORDER — NOREPINEPHRINE BITARTRATE/D5W 8 MG/250ML
.01-1 PLASTIC BAG, INJECTION (ML) INTRAVENOUS CONTINUOUS
Status: DISCONTINUED | OUTPATIENT
Start: 2024-11-12 | End: 2024-11-13

## 2024-11-12 RX ORDER — IPRATROPIUM BROMIDE AND ALBUTEROL SULFATE 2.5; .5 MG/3ML; MG/3ML
3 SOLUTION RESPIRATORY (INHALATION) 4 TIMES DAILY PRN
Status: DISCONTINUED | OUTPATIENT
Start: 2024-11-12 | End: 2024-11-22 | Stop reason: HOSPADM

## 2024-11-12 RX ORDER — SODIUM CHLORIDE 9 MG/ML
50 INJECTION, SOLUTION INTRAVENOUS CONTINUOUS
Status: DISCONTINUED | OUTPATIENT
Start: 2024-11-12 | End: 2024-11-12

## 2024-11-12 RX ORDER — HYDRALAZINE HYDROCHLORIDE 50 MG/1
25 TABLET, FILM COATED ORAL EVERY 6 HOURS PRN
Status: DISCONTINUED | OUTPATIENT
Start: 2024-11-13 | End: 2024-11-13

## 2024-11-12 RX ORDER — CALCIUM GLUCONATE 20 MG/ML
2 INJECTION, SOLUTION INTRAVENOUS EVERY 6 HOURS PRN
Status: DISCONTINUED | OUTPATIENT
Start: 2024-11-12 | End: 2024-11-22 | Stop reason: HOSPADM

## 2024-11-12 RX ORDER — AZITHROMYCIN 250 MG/1
250 TABLET, FILM COATED ORAL 3 TIMES WEEKLY
Status: DISCONTINUED | OUTPATIENT
Start: 2024-11-12 | End: 2024-11-22 | Stop reason: HOSPADM

## 2024-11-12 RX ORDER — SODIUM CHLORIDE 9 MG/ML
50 INJECTION, SOLUTION INTRAVENOUS CONTINUOUS
Status: ACTIVE | OUTPATIENT
Start: 2024-11-12 | End: 2024-11-13

## 2024-11-12 RX ORDER — NOREPINEPHRINE BITARTRATE/D5W 8 MG/250ML
PLASTIC BAG, INJECTION (ML) INTRAVENOUS
Status: COMPLETED
Start: 2024-11-12 | End: 2024-11-12

## 2024-11-12 RX ORDER — POTASSIUM CHLORIDE 1.5 G/1.58G
20 POWDER, FOR SOLUTION ORAL EVERY 6 HOURS PRN
Status: DISCONTINUED | OUTPATIENT
Start: 2024-11-12 | End: 2024-11-22 | Stop reason: HOSPADM

## 2024-11-12 RX ORDER — POTASSIUM CHLORIDE 20 MEQ/1
40 TABLET, EXTENDED RELEASE ORAL EVERY 6 HOURS PRN
Status: DISCONTINUED | OUTPATIENT
Start: 2024-11-12 | End: 2024-11-22 | Stop reason: HOSPADM

## 2024-11-12 RX ORDER — HYDRALAZINE HYDROCHLORIDE 20 MG/ML
10 INJECTION INTRAMUSCULAR; INTRAVENOUS
Status: DISCONTINUED | OUTPATIENT
Start: 2024-11-12 | End: 2024-11-12

## 2024-11-12 RX ORDER — ACETAMINOPHEN 10 MG/ML
15 INJECTION, SOLUTION INTRAVENOUS ONCE
Status: COMPLETED | OUTPATIENT
Start: 2024-11-12 | End: 2024-11-13

## 2024-11-12 RX ORDER — POTASSIUM CHLORIDE 1.5 G/1.58G
40 POWDER, FOR SOLUTION ORAL EVERY 6 HOURS PRN
Status: DISCONTINUED | OUTPATIENT
Start: 2024-11-12 | End: 2024-11-22 | Stop reason: HOSPADM

## 2024-11-12 RX ORDER — PANTOPRAZOLE SODIUM 40 MG/1
40 TABLET, DELAYED RELEASE ORAL
Status: DISCONTINUED | OUTPATIENT
Start: 2024-11-13 | End: 2024-11-22 | Stop reason: HOSPADM

## 2024-11-12 RX ORDER — HYDRALAZINE HYDROCHLORIDE 20 MG/ML
10 INJECTION INTRAMUSCULAR; INTRAVENOUS
Status: DISCONTINUED | OUTPATIENT
Start: 2024-11-12 | End: 2024-11-13

## 2024-11-12 RX ORDER — MAGNESIUM SULFATE HEPTAHYDRATE 40 MG/ML
4 INJECTION, SOLUTION INTRAVENOUS EVERY 6 HOURS PRN
Status: DISCONTINUED | OUTPATIENT
Start: 2024-11-12 | End: 2024-11-22 | Stop reason: HOSPADM

## 2024-11-12 RX ORDER — HEPARIN SODIUM 5000 [USP'U]/ML
5000 INJECTION, SOLUTION INTRAVENOUS; SUBCUTANEOUS EVERY 8 HOURS
Status: DISCONTINUED | OUTPATIENT
Start: 2024-11-12 | End: 2024-11-22 | Stop reason: HOSPADM

## 2024-11-12 RX ORDER — PANTOPRAZOLE SODIUM 40 MG/10ML
40 INJECTION, POWDER, LYOPHILIZED, FOR SOLUTION INTRAVENOUS
Status: DISCONTINUED | OUTPATIENT
Start: 2024-11-13 | End: 2024-11-22 | Stop reason: HOSPADM

## 2024-11-12 RX ORDER — LABETALOL HYDROCHLORIDE 5 MG/ML
5 INJECTION, SOLUTION INTRAVENOUS ONCE
Status: COMPLETED | OUTPATIENT
Start: 2024-11-12 | End: 2024-11-12

## 2024-11-12 RX ORDER — ASPIRIN 300 MG/1
300 SUPPOSITORY RECTAL DAILY
Status: DISCONTINUED | OUTPATIENT
Start: 2024-11-12 | End: 2024-11-13

## 2024-11-12 RX ADMIN — ONDANSETRON 4 MG: 2 INJECTION INTRAMUSCULAR; INTRAVENOUS at 05:50

## 2024-11-12 RX ADMIN — PERFLUTREN 10 ML OF DILUTION: 6.52 INJECTION, SUSPENSION INTRAVENOUS at 15:01

## 2024-11-12 RX ADMIN — LIDOCAINE 1 PATCH: 4 PATCH TOPICAL at 11:16

## 2024-11-12 RX ADMIN — Medication 0.02 MCG/KG/MIN: at 07:00

## 2024-11-12 RX ADMIN — TIOTROPIUM BROMIDE INHALATION SPRAY 2 PUFF: 3.12 SPRAY, METERED RESPIRATORY (INHALATION) at 08:55

## 2024-11-12 RX ADMIN — LABETALOL HYDROCHLORIDE 5 MG: 5 INJECTION, SOLUTION INTRAVENOUS at 13:25

## 2024-11-12 RX ADMIN — FLUTICASONE FUROATE AND VILANTEROL TRIFENATATE 1 PUFF: 100; 25 POWDER RESPIRATORY (INHALATION) at 08:55

## 2024-11-12 RX ADMIN — HYDRALAZINE HYDROCHLORIDE 10 MG: 20 INJECTION INTRAMUSCULAR; INTRAVENOUS at 05:24

## 2024-11-12 RX ADMIN — MAGNESIUM SULFATE 4 G: 4 INJECTION INTRAVENOUS at 05:40

## 2024-11-12 RX ADMIN — POTASSIUM CHLORIDE 20 MEQ: 14.9 INJECTION, SOLUTION INTRAVENOUS at 05:39

## 2024-11-12 RX ADMIN — SODIUM CHLORIDE 500 ML: 9 INJECTION, SOLUTION INTRAVENOUS at 12:51

## 2024-11-12 RX ADMIN — PHENYLEPHRINE HYDROCHLORIDE 1.1 MCG/KG/MIN: 10 INJECTION INTRAVENOUS at 17:10

## 2024-11-12 RX ADMIN — HEPARIN SODIUM 5000 UNITS: 5000 INJECTION INTRAVENOUS; SUBCUTANEOUS at 18:16

## 2024-11-12 RX ADMIN — PHENYLEPHRINE-NACL IV SOLUTION 10 MG/250ML-0.9% 0.1 MCG/KG/MIN: 10-0.9/25 SOLUTION at 00:55

## 2024-11-12 RX ADMIN — SODIUM CHLORIDE 50 ML/HR: 9 INJECTION, SOLUTION INTRAVENOUS at 18:23

## 2024-11-12 RX ADMIN — IOHEXOL 75 ML: 350 INJECTION, SOLUTION INTRAVENOUS at 05:05

## 2024-11-12 RX ADMIN — PHENYLEPHRINE HYDROCHLORIDE 0.9 MCG/KG/MIN: 10 INJECTION INTRAVENOUS at 05:51

## 2024-11-12 RX ADMIN — CALCIUM GLUCONATE 1 G: 20 INJECTION, SOLUTION INTRAVENOUS at 05:39

## 2024-11-12 RX ADMIN — Medication 0.09 MCG/KG/MIN: at 19:30

## 2024-11-12 RX ADMIN — POTASSIUM CHLORIDE 40 MEQ: 29.8 INJECTION, SOLUTION INTRAVENOUS at 15:00

## 2024-11-12 RX ADMIN — ASPIRIN 300 MG: 300 SUPPOSITORY RECTAL at 21:07

## 2024-11-12 RX ADMIN — IOHEXOL 100 ML: 350 INJECTION, SOLUTION INTRAVENOUS at 10:48

## 2024-11-12 ASSESSMENT — PAIN - FUNCTIONAL ASSESSMENT
PAIN_FUNCTIONAL_ASSESSMENT: 0-10

## 2024-11-12 ASSESSMENT — PAIN SCALES - GENERAL
PAINLEVEL_OUTOF10: 0 - NO PAIN

## 2024-11-12 NOTE — SIGNIFICANT EVENT
Stroke Team Update    Overnight, pt noted to be pressure dependent so started on amos as pt's pressures tend to lower when asleep (see NSU significant event note). Language/strength improved when SBP maintained 160-180s. However, as the night progressed, pt noted to have progressively more aphasia, R-side weakness, L gaze preference. Permissive HTN limited by having had MT & TNK within the last 24h. Discussed w/stroke fellow. Will plan to maintain SBP goal <180, while avoiding aggressive PRN use if above 180 to avoid significant BP drops. While awaiting MRI, will obtain CTH / CTA HN to eval for stroke burden as pt is now more consistently mute despite higher pressures.     Summary:  - CTH/CTA HN   - MRI Brain w/o contrast   - Maintain -180s. Avoid aggressive use of PRN antihypertensives to avoid significant BP drops

## 2024-11-12 NOTE — PROGRESS NOTES
VASCULAR SURGERY PROGRESS NOTE  Assessment/Plan   79 y.o. female admitted with left MCA stroke. Vascular surgery consulted after difficulties were had with arterial access for cerebral angiogram. 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 A CTA was obtained today to evaluate, which shows an infrarenal aortic occlusion, chronic in appearance. On discussing the case further, it sounds like there was no palpable femoral pulse prior to procedure, further supporting that this infrarenal aortic occlusion is chronic, and so the iliac dissection is less clinically important. Was also able to find monophasic PT signals at bedside today, symmetric in their character.  Regarding the radial artery, there is a palpable right radial pulse and good signals in the palmar arch, there is no immediate threat to the hand.   Would continue observing the pedal signals and the right hand.     Patient will need follow up if full measures are continued for her care, to monitor her PAD with her aortic occlusion. Vascular surgery will sign off, but please reach out to fellow over chat closer to discharge and we can coordinate follow up.    D/w attending, Dr. Nano Dia MD  Vascular Surgery Fellow  Service Pager: [phone]    Subjective   Does not interact.     Objective   Heart Rate:  []   Temp:  [36.2 °C (97.2 °F)-36.4 °C (97.5 °F)]   Resp:  [5-32]   BP: ()/()   SpO2:  [91 %-100 %]     Physical Exam:  General: NAD, appears comfortable   Neuro: does not interact, moves toes/ feet spontaneously but not to command  HEENT: NC/AT  CV: RRR  Pulse exam: palpable right radial pulse, monophasic PT signals bilaterally  Pulm: normal respiratory effort on RA  Abd: soft, NTND  Extr: FROM, no deformities, no swelling  Skin: no lesions or rashes     Labs:  Results from last 7 days   Lab Units 11/12/24  0045 11/11/24  1757   WBC AUTO x10*3/uL 10.6 14.7*    HEMOGLOBIN g/dL 11.7* 12.9   HEMATOCRIT % 35.0* 40.2   PLATELETS AUTO x10*3/uL 165 167     Results from last 7 days   Lab Units 11/12/24  1254 11/12/24  0045 11/11/24  1757   SODIUM mmol/L 141 141 137   POTASSIUM mmol/L 3.1* 3.0* 3.7   CHLORIDE mmol/L 106 104 97*   CO2 mmol/L 27 24 20*   BUN mg/dL 3* 5* 67*   CREATININE mg/dL 0.39* 0.47* 3.80*   GLUCOSE mg/dL 104* 152* 111*   CALCIUM mg/dL 7.3* 7.6* 8.9

## 2024-11-12 NOTE — SIGNIFICANT EVENT
2045: RN notified this provider that patient became aphasic and not following commands. Upon assessment, patient noted with L gaze preference, aphasic, L side spont and R side w/d to nox stim. It was noted at 2030, patient BP 99/50. With stimulation SBP improved to 127. Patient was taken to [facility] with RN and this provider along with neurology call team. CTH stable, no hemorrhage. During transport back to NSU SBP 170s and exam improved back to baseline. Aox3, EOMI, +FC x4, strength 4/5. Based on findings, exam is pressure dependent. Plan to keep -170. Started on maintenance NS IVF at 50cc/hr.       2230: RN notified this provider again than patient developed L gaze preference, aphasic and not following commands on the R side. Patient was resting with /47. Started NS bolus 1L/1h and was given Phenylephrine 120mcg. BP improved to 200/160. Exam improved back to baseline and was following commands x4. Based off BP dependent exam, arterial line was place for close blood pressure control. Miguel continuous infusion ordered, if needed to maintain -200. Will continue to monitor blood pressure and exam closely.     Total additional critical care time of 60 minutes, with > 50% of time spent in direct contact with patient/family for education, counseling and coordination of care.

## 2024-11-12 NOTE — PROGRESS NOTES
Speech-Language Pathology    SLP Adult Inpatient Speech-Language Cognition Evaluation     Patient Name: Zulma Rao  MRN: [id number]  Today's Date: 11/12/2024   Time Calculation  Start Time: 0940  End Time: 0954       SLP Assessment:  -Pt w/ reduced ability to particiapte in evaluation; deficits in both expressive and receptive language; will require further cognitive assessment  -Reduced ability to express wants/needs       SLP Plan:  Skilled SLP  SLP Frequency: 2x per week  Duration: 3 weeks  Discussed POC: Patient  Discussed Risks/Benefits: Yes  Patient/Caregiver Agreeable: Yes  SLP - OK to Discharge: Yes    Goals:  Pt will follow 1-step commands with > 80% accuracy with min cues.     Date initiated: 11/12/24   Expected Time Frame to Meet Goal: 2-3 weeks     Pt will respond to Y/N questions with > 80% accuracy given min cues.     Date initiated: 11/12/24   Expected Time Frame to Meet Goal: 2-3 weeks     Pt will identify an object, its color, and its function with > 80% accuracy given min cues.     Date initiated: 11/12/24   Expected Time Frame to Meet Goal: 2-3 weeks     Pt will express wants and needs to staff and family via total communication.   Date initiated: 11/12/24   Expected Time Frame to Meet Goal: 2-3 weeks        Subjective   RN cleared pt for evaluation.  Pt properly identified and evaluated bedside.  Awake, but required mod cues to participate throughout session.  Room air.  No family present.  Frequent grimacing, but unable to indicate presence or absence of pain.      Pt admitted w/ an acute L MCA CVA; s/p TNK and MT.       Objective     Cognition:  Overall Cognitive Status: Impaired (Difficult to assess)  Orientation Level:  (Oriented to person independently; required mod cues to orient otherwise)  Problem Solving:  (Limited verbal responses; unable to assess)  Abstract Reasoning:  (Limited verbal responses; unable to assess)      Motor Speech Production:  Motor Speech Disorder:  (Limited  verbalizations; no apparent motor speech disorder)      Auditory Comprehension:   Yes/No Questions: Impaired  Basic Questions: 50% accuracy  Commands: Impaired  One Step Basic Commands:  (75% accuracy; mod cues required at times)      Verbal:  Primary Language: English  Confrontation Naming: Impaired  Confrontation Objects:  (able to ID objects in 2/3 trials; able to state function in 0/3 trials)  Repetition: Impaired  Sentence Completion: Within Functional Limits        11/12/24 at 3:37 PM - Nithya Trotter, SLP

## 2024-11-12 NOTE — POST-PROCEDURE NOTE
Pre-Procedure Checklist:  Emergent Line Insertion: No  Type of Line to be Placed: PICC  Consent Obtained: Yes  Emergency Medication Necessary: No  Patient Identified with 2 Independent Identifiers: Yes  Review of Allergies, Anticoagulation, Relevant Labs, ECG/Telemetry: Yes  Risks/Benefits/Alternatives Discussed with Patient/POA/Legal Representative: Yes  Stop Sign on Door: Yes  Time Out Performed: Yes  Catheter Exchange: No    Positioning Checklist:  All People, Including Patient, in the Room with Cap and Mask: Yes  Fluoroscopy Used to Identify Vessel and Guide Insertion: No   Sterile Cover Used: Yes  Full Barrier Precautions Followed (Mask, Cap, Gown, Gloves): Yes  Hands Washed: Yes  Monitors Attached with Sound Alarms On: No  Full Body Sterile Drape (Head-to-Toe) Used to Cover Patient: Yes  Trendelenburg Position (For IJ and Subclavian): No  CHG Skin Prep Used and Allowed to Air Dry to Skin Procedure: Yes    Procedure Checklist:  Blood Aspirated From All Lumens, All Ports Subsequently Flushed: Yes  Catheter Caps Placed on All Lumens; Lumens Clamped: Yes  Maintain Guidewire Control Throughout, Ensuring Guidewire Removal: Yes  Maintain Sterile Field Throughout Insertion: Yes  Catheter Secured: Yes  Confirmatory Test of Venous Placement: Non-Pulsatile Blood    Post Procedure Checklist:  Date and Time Written on Dressing: Yes  Sharp and Wire Count and Safe Disposal of all Sharps/Wires: Yes  Sterile Dressing Applied Per Protocol: Yes  X-ray Ordered or ECG Image: Yes    PICC Insertion Details:  Size (Fr): 4  Lumen Type: single  Catheter to Vein Ratio Less Than 50%: Yes  Total Length (cm): 33  External Length (cm): 0  Orientation: right   Location: basilic  Site Prep: Chlorohexidine; Usual sterile procedure followed  Local Anesthetic: Injectable/Subcutaneous  Indication:  Insertion Team Members in the Room: Nurse, LPN  Initial Extremity Circumference (cm): 38  Insertion Attempts: 1  Patient Tolerance: Tolerated Well,  Age Appropriate  Comfort Measures: Subcutaneous anesthetic; Verbal  Procedure Location: Bedside  Safety Measures: Patient specific safety measures addressed with RN  Estimated Blood Loss (mL): 0  Vessel Fully Compressible Proximally and Distally to Insertion Site: Yes  Brisk Blood Return Obtained and Line Draws Easily: Yes  Tip Location:SVC  Line Confirmation: ECG  Lot #:[id number]  : Bard  PICC Line Exp Date:12/31/2025  Securement: Stat Lock  Post Procedure Checklist: Handoff with RN; Obtain all new IV tubing prior to use; Bed at lowest level and wheels locked; Line discharge information at bedside.  Additional Details: Line was inserted using Modified Seldinger's Technique.   Placed by: Luisa Kline RN-[facility]

## 2024-11-12 NOTE — PROCEDURES
Arterial Line Insertion    Date/Time: 11/11/2024 11:11 PM    Performed by: NICK Cardenas  Authorized by: NICK Cardenas    Consent:     Consent obtained:  Emergent situation    Risks, benefits, and alternatives were discussed: yes      Risks discussed:  Bleeding, ischemia, repeat procedure, infection and pain  Universal protocol:     Procedure explained and questions answered to patient or proxy's satisfaction: yes      Relevant documents present and verified: yes      Site/side marked: yes      Immediately prior to procedure, a time out was called: yes      Patient identity confirmed:  Verbally with patient and arm band  Indications:     Indications: hemodynamic monitoring and multiple ABGs    Pre-procedure details:     Skin preparation:  Chlorhexidine    Preparation: Patient was prepped and draped in sterile fashion    Sedation:     Sedation type:  None  Anesthesia:     Anesthesia method:  Local infiltration    Local anesthetic:  Lidocaine 2% w/o epi  Procedure details:     Location:  L radial    Adama's test performed: yes      Adama's test abnormal: no      Needle gauge:  20 G    Placement technique:  Ultrasound guided    Number of attempts:  1    Transducer: waveform confirmed    Post-procedure details:     Post-procedure:  Sutured and sterile dressing applied    Procedure completion:  Tolerated

## 2024-11-12 NOTE — HOSPITAL COURSE
Hospital Course    Ms. Rao is an 78 yo female with history of stroke in 7/2024 HTN, HLD, R CEA 2011, HFpEF, COPD, anxiety, partial hepatectomy for HCC 2019, mRS 0, transferred to [facility] from [facility] 11/11/2024 for acute left MCA infarct with NIHSS 21, s/p TNK at OSH. NIH on arrival 21. Underwent angiogram for mechanical thrombectomy complicated by chronic infrarenal aortic occlusion, but found complete recanalization secondary to TNK. Angiogram complicated by severe diffuse atherosclerosis of vasculature, requiring multiple entry attempts/passes. Access gained via right radial artery. Post-angiogram NIHSS 20 and patient was admitted to NSU, initiated on DAPT 24 hours after TNK administration.    In the NSU, early NIH 4, with CTA 11/12 demonstrating 65% stenosis of L ICA, chronic complete occlusion of R CCA and ICA; MRI 11/13 demonstrating small stroke burden largely in L MCA-DAKOTA watershed. Patient had blood pressure dependent examinations. BP goal was increased to 180-200 mmHg, requiring vasopressors and midodrine. Patient had deterioration in her neurological exam 11/13 overnight with right hemiparesis, gaze preference (corresponding NIH=10) with suspected contributory SBP decrease to ~150. Work up EEG negative for seizure. Repeat MRI (11/14) showed increased infarct in the watershed area. SBP goal of 180-200 was maintained with vasopressors. Left CCA ICA CEA was performed with neurosurgery 11/18 for continued necessity of vasopressor support, BP dependant exams, and complete R ICA occlusion. Plavix held prior to procedure. Procedure was complicated by some reduction of brainwave monitoring during suturing, but returned to baseline after completion of procedure.     Patient's neurological exam remained stable at NIHSS 3 s/p L CEA. She was weaned off of vasopressor support, received a Cardene drip post-operatively, downgraded to ROXANE status with BP managed by PRN labetalol and hydralazine.  Plavix was not restarted in the setting of completed L CEA. She was noted to have an neurological exam change overnight 11/20/2024, although CT head non-contrast did not demonstrate acute intraparenchymal bleed, and exam returned to baseline by morning.     Of note, this patient experienced intermittent bilateral hip pain (R>L) and other bilateral extremity pain throughout her hospital course. This may be attributed in part to her 09/2024 R hip surgical fixation, which she reports at baseline; also consider vascular claudication in the setting of chronic infrarenal aortic occlusion/diffuse atherosclerosis. Regarding outpatient follow-up, the patient will be seen by vascular surgery in 4-8 weeks for chronic infrarenal aortic occlusion; neurosurgery for wound check in 2 weeks and carotid ultrasound in 6 weeks.     Stroke work up  - CT head w/o contrast (11/20): No acute intraparenchymal hemorrhage.  - MRI (11/14): Interval worsening of L DAKOTA-MCA watershed infarct stroke burden.  - MRI (11/13): Acute/subacute watershed ischemia throughout the left cerebral  Hemisphere, subcentimeter cortical infarctions within L superior  frontal lobe, L parietal and temporal lobes.  - CTA (11/12): Proximal left M1 occlusion with chronic right ICA occlusion; L ICA 65% stenosis by NASCET criteria  - EKG (11/14): NSR with premature supraventricular complexes  - TTE (11/12): LVEF 65-70%, LVH, severely dilated LA, negative bubble study   - LDL (11/11) 92, A1c (11/11) 5.8  - BNP (11/11) 188, Trop 83

## 2024-11-12 NOTE — CONSULTS
VASCULAR SURGERY CONSULT NOTE    Assessment/Plan   Zulma Rao is 79 y.o. female with history of hypertension, hyperlipidemia, strokes, HFpEF, COPD, anxiety, partial hepatectomy, who presented to outside hospital after acute left MCA stroke status post TNK at [facility] transferred to neuro ICU for thrombectomy.  Vascular surgery consulted for possible radial intimal injury during catheter removal from thrombectomy.    Plan:  No acute intervention at this time. Pt has good flow through radial, ulner, and palmar arch. No indication of hematoma or pseudoaneurysm at this time. Please reach out if any new complaints or concerns. Vascular surgery will sign off at this time.     D/w attending, Dr. Nano Bear md/narayan [id number]    Subjective   HPI:  Zulma Rao is 79 y.o. female with history of multiple strokes, hypertension, hyperlipidemia, HFpEF, COPD, anxiety who most recently presents with left MCA stroke status post attempted mechanical thrombectomy.  Per consulting team patient had bilateral femoral access and was difficult to traverse aorta because of severe calcific disease.  Subsequently they attempted a right radial access and had difficulty traversing the subclavian.  Upon removal of devices there was suspected intimal injury to the radial artery noticed with tissue on the instrumentation.  Patient did not have any severe bleeding, hematoma, pseudoaneurysm formation in the acute setting.  Vascular surgery was contacted in case of any necessary intervention.        PMH:  has a past medical history of Personal history of other diseases of the circulatory system, Personal history of other diseases of the circulatory system, Personal history of other diseases of the nervous system and sense organs, and Personal history of other endocrine, nutritional and metabolic disease.      PSH: Past Surgical History:  12/03/2018: OTHER SURGICAL HISTORY      Comment:  Eye surgery  12/03/2018: OTHER SURGICAL  HISTORY      Comment:  Foot surgery  12/03/2018: OTHER SURGICAL HISTORY      Comment:  Cataract surgery  12/03/2018: OTHER SURGICAL HISTORY      Comment:  Carotid thromboendarterectomy  12/03/2018: OTHER SURGICAL HISTORY      Comment:  Tonsillectomy      Home Meds:  Current Facility-Administered Medications on File Prior to Encounter   Medication Dose Route Frequency Provider Last Rate Last Admin    glucagon (Glucagen) injection 1 mg  1 mg intramuscular q15 min PRN Felipa Swift MD        glucagon (Glucagen) injection 1 mg  1 mg intramuscular q15 min PRN Felipa Swift MD        oxygen (O2) therapy   inhalation Continuous PRN - O2/gases Felipa Swift MD        sodium chloride 0.9% infusion  100 mL/hr intravenous Continuous Felipa Swift MD        [DISCONTINUED] atorvastatin (Lipitor) tablet 40 mg  40 mg oral Nightly Felipa Swift MD        [DISCONTINUED] atorvastatin (Lipitor) tablet 80 mg  80 mg oral Nightly Felipa Swift MD        [DISCONTINUED] dextrose 50 % injection 12.5 g  12.5 g intravenous q15 min PRN Felipa Swift MD        [DISCONTINUED] dextrose 50 % injection 25 g  25 g intravenous q15 min PRN Felipa Swift MD        [DISCONTINUED] glucagon (Glucagen) injection 1 mg  1 mg intramuscular q15 min PRN Felipa Swift MD        [DISCONTINUED] glucagon (Glucagen) injection 1 mg  1 mg intramuscular q15 min PRN Felipa Swift MD        [DISCONTINUED] hydrALAZINE (Apresoline) injection 10 mg  10 mg intravenous q20 min PRN Felipa Swift MD        [DISCONTINUED] hydrALAZINE (Apresoline) tablet 25 mg  25 mg oral q6h PRN Felipa Swift MD        [DISCONTINUED] insulin lispro injection 0-5 Units  0-5 Units subcutaneous TID AC Felipa Swift MD        [DISCONTINUED] labetaloL (Normodyne,Trandate) injection 10 mg  10 mg intravenous q10 min PRN Felipa Swift MD        [DISCONTINUED] polyethylene glycol (Glycolax, Miralax) packet 17 g  17 g oral Daily Felipa Swift MD         Current  Outpatient Medications on File Prior to Encounter   Medication Sig Dispense Refill    acetaminophen (Tylenol 8 HOUR) 650 mg ER tablet Take 1 tablet (650 mg) by mouth every 12 hours if needed for mild pain (1 - 3). Do not crush, chew, or split.      aspirin 81 mg EC tablet Take 1 tablet (81 mg) by mouth once daily.      atorvastatin (Lipitor) 80 mg tablet Take 1 tablet (80 mg) by mouth once daily.      azithromycin (Zithromax) 250 mg tablet Take 1 tablet (250 mg) by mouth. Three times a week ( Monday , Wednesday and Friday )      busPIRone (Buspar) 10 mg tablet Take 1 tablet (10 mg) by mouth 3 times a day.      cloNIDine (Catapres) 0.1 mg tablet Take 1 tablet (0.1 mg) by mouth 2 times a day as needed (if SPB > 170).      clopidogrel (Plavix) 75 mg tablet Take 1 tablet (75 mg) by mouth once daily.      ergocalciferol (Vitamin D-2) 1.25 MG (09761 UT) capsule Take 1 capsule (1,250 mcg) by mouth 1 (one) time per week.      fluticasone-umeclidin-vilanter (Trelegy Ellipta) 100-62.5-25 mcg blister with device Inhale 1 puff once daily.   At the same time each day      furosemide (Lasix) 20 mg tablet Take 1 tablet (20 mg) by mouth once daily.      ipratropium-albuteroL (Duo-Neb) 0.5-2.5 mg/3 mL nebulizer solution Take 3 mL by nebulization 4 times a day as needed for wheezing or shortness of breath.      levothyroxine (Synthroid, Levoxyl) 25 mcg tablet Take 1 tablet (25 mcg) by mouth early in the morning.. Take on an empty stomach at the same time each day, either 30 to 60 minutes prior to breakfast      melatonin 3 mg tablet Take 1 tablet (3 mg) by mouth once daily at bedtime.      pantoprazole (ProtoNix) 40 mg EC tablet Take 1 tablet (40 mg) by mouth once daily in the morning. Take before meals. Do not crush, chew, or split.          Allergies:  Allergies   Allergen Reactions    Amoxicillin Unknown    Avelox [Moxifloxacin] Unknown    Doxycycline Unknown    Sulfa (Sulfonamide Antibiotics) Unknown    Vicodin  "[Hydrocodone-Acetaminophen] Unknown       SH/FH:   has no history on file for tobacco use, alcohol use, and drug use.    ROS: 12 system negative except HPI    Objective   Vitals:  Heart Rate:  []   Temp:  [36.3 °C (97.3 °F)-36.4 °C (97.5 °F)]   Resp:  [12-29]   BP: ()/()   Height:  [162.6 cm (5' 4\")]   Weight:  [49.8 kg (109 lb 12.6 oz)-50 kg (110 lb 3.7 oz)]   SpO2:  [91 %-100 %]     Exam:  Constitutional: No acute distress, resting comfortably    CV: no tachycardia  Pulm: non-labored on room air    Skin: warm and dry  Musculoskeletal: moving all extremities  Extremities: Dopplerable radial, ulnar, palmar arch    Labs:  Results from last 7 days   Lab Units 11/11/24  1757   WBC AUTO x10*3/uL 14.7*   HEMOGLOBIN g/dL 12.9   PLATELETS AUTO x10*3/uL 167      Results from last 7 days   Lab Units 11/11/24  1757   SODIUM mmol/L 137   POTASSIUM mmol/L 3.7   CHLORIDE mmol/L 97*   CO2 mmol/L 20*   BUN mg/dL 67*   CREATININE mg/dL 3.80*   GLUCOSE mg/dL 111*   MAGNESIUM mg/dL 2.16   PHOSPHORUS mg/dL 6.7*                Imaging:  Reviewed independently by vascular team:      "

## 2024-11-12 NOTE — PROGRESS NOTES
"Zulma Rao is a 79 y.o. female on day 1 of admission presenting with Cerebrovascular accident (CVA) due to occlusion of left middle cerebral artery (Multi).    Subjective   Overnight, patient was initially noted to be speaking more, had strength improvement in extremities, resolution of forced gaze deviation. However, patient experienced 2 episodes of worsening aphasia, being unable to follow commands, return of L gaze preference, corresponding with low Bps of 99/50 at ~2030 11/11 and 101/47 at 2230 indicating suspected BP dependant exam. OVN CTH non-con, CT angio h/n stable and w/o acute intracranial hemorrhage or mass effect. Between hypotensive periods, exam returns to midline.  Left radial arterial line placed ~2300 for BP monitoring.    Overnight, patient given MF 50cc/hr IV NS and subsequently bolused with 1L/1hr NS. Additionally given phenylephrine & norepinephrine; electrolytes (calcium, magnesium, potassium) repleted overnight as well.    This morning, the patient endorsed pain of her right wrist at site of cath site. Additionally endorsed nausea.        Objective   Visit Vitals  BP (!) 101/47   Pulse 90   Temp 36.3 °C (97.3 °F) (Temporal)   Resp 25   Ht 1.626 m (5' 4\")   Wt 49.8 kg (109 lb 12.6 oz)   SpO2 99%   BMI 18.85 kg/m²   BSA 1.5 m²   **/47 from 11/11/2024 2230. /66 per arterial line 11/12/2024 1000     Physical Exam  Vitals and nursing note reviewed.   HENT:      Head: Normocephalic and atraumatic.      Mouth/Throat:      Mouth: Mucous membranes are moist.   Eyes:      General: Lids are normal.      Extraocular Movements: Extraocular movements intact.      Conjunctiva/sclera: Conjunctivae normal.      Comments: Visual fields intact bilaterally in all quadrants to threat - closes eyes to threat on exam   Cardiovascular:      Rate and Rhythm: Normal rate and regular rhythm.      Pulses:           Dorsalis pedis pulses are 0 on the right side and 0 on the left side.        Posterior " tibial pulses are detected w/ Doppler on the right side and 0 on the left side.      Heart sounds: Normal heart sounds. No murmur heard.  Pulmonary:      Effort: Pulmonary effort is normal.      Breath sounds: Normal breath sounds.   Neurological:      Mental Status: She is alert.      Cranial Nerves: Dysarthria present.       Neurological Exam  Mental Status  Alert. Oriented only to person and time. Mild dysarthria present. Able to name objects.    Cranial Nerves  CN II: Vision test: Visual fields intact bilaterally in all quadrants to threat - closes eyes to threat on exam.  CN III, IV, VI: Extraocular movements intact bilaterally. Normal lids and orbits bilaterally.   Right pupil: Round. Reactive to light. Reactive to accommodation.   Left pupil: Round. Reactive to light. Reactive to accommodation.  Relative afferent pupillary defect absent.  CN V: Facial sensation is normal.  CN VIII: Hearing grossly intact to conversation.  CN IX, X: Palate elevates symmetrically  CN XI:  Right: Sternocleidomastoid strength is weak. Trapezius strength is normal.  Left: Sternocleidomastoid strength is weak. Trapezius strength is normal. SCM strength testing possibly limited by comprehension.  CN XII: Tongue midline without atrophy or fasciculations.    Motor   No fasciculations present.  Strength: Moves all extremities, strength tested to shoulder/hip extension during NIHSS exam:   LUE, LLE: 3/5  RUE, RLE: 3-/5.    Sensory  Extremity sensation grossly intact and symmetric to light touch bilaterally. .    Reflexes  Right Plantar: downgoing  Left Plantar: downgoing    Coordination  Right: Finger-to-nose normal.Left: Finger-to-nose normal.  Heel-to-shin test exam limited by comprehension/cooperation. .    11/12/24 @ 7:30AM  NIHSS: 4  - Level of consciousness: 0 = Alert  - LOC Question: 0 = Both correct  - LOC Commands: 0 = Obeys both  - Best Gaze: 0 = Normal  - Visual Field: 0 = No visual loss  - Facial Paresis: 1 = Minor paresis  (R>L) asymmetry upon smiling  - LUE: 0 = No drift; 10 sec  - RUE: 1 = Limb drifts downward before 10 seconds  - LLE: 0 = No drift; 5 sec  - RLE: 0 = No drift; 5 sec  - Limb Ataxia: 0 = Absent  - Sensory: 0 = Absent  - Best Language: 1 = Mild/moderate aphasia  - Dysarthria: 1 = Mild/moderate dysarthria  - Neglect: 0 = None    Relevant Results   MEDICATIONS:  Scheduled medications  atorvastatin, 80 mg, oral, Nightly  azithromycin, 250 mg, oral, Once per day on Monday Wednesday Friday  busPIRone, 10 mg, oral, TID  [START ON 11/13/2024] pantoprazole, 40 mg, oral, Daily before breakfast   Or  [START ON 11/13/2024] esomeprazole, 40 mg, nasoduodenal tube, Daily before breakfast   Or  [START ON 11/13/2024] pantoprazole, 40 mg, intravenous, Daily before breakfast  tiotropium, 2 puff, inhalation, Daily   And  fluticasone furoate-vilanteroL, 1 puff, inhalation, Daily  insulin lispro, 0-5 Units, subcutaneous, TID AC  levothyroxine, 25 mcg, oral, Daily  lidocaine, 5 mL, infiltration, Once  lidocaine, 1 patch, transdermal, Daily  magnesium sulfate, 4 g, intravenous, Once  melatonin, 3 mg, oral, Nightly  perflutren lipid microspheres, 0.5-10 mL of dilution, intravenous, Once in imaging  phenylephrine, , ,   polyethylene glycol, 17 g, oral, Daily  sennosides, 2 tablet, oral, BID      Continuous medications  norepinephrine, 0.01-1 mcg/kg/min, Last Rate: 0.04 mcg/kg/min (11/12/24 1146)  phenylephrine, 0-2 mcg/kg/min, Last Rate: 0.8 mcg/kg/min (11/12/24 1149)  sodium chloride 0.9%, 50 mL/hr, Last Rate: 50 mL/hr (11/11/24 2201)      PRN medications  PRN medications: acetaminophen **OR** acetaminophen, alteplase, calcium gluconate, calcium gluconate, dextrose, dextrose, glucagon, glucagon, hydrALAZINE **FOLLOWED BY** [START ON 11/13/2024] hydrALAZINE, ipratropium-albuteroL, labetaloL, magnesium sulfate, magnesium sulfate, ondansetron ODT **OR** ondansetron, oxygen, phenylephrine, potassium chloride CR **OR** potassium chloride,  potassium chloride CR **OR** potassium chloride, potassium chloride    LABS:  Results for orders placed or performed during the hospital encounter of 11/11/24 (from the past 24 hours)   POCT GLUCOSE   Result Value Ref Range    POCT Glucose 130 (H) 74 - 99 mg/dL   Blood Gas Venous Full Panel Unsolicited   Result Value Ref Range    POCT pH, Venous 7.41 7.33 - 7.43 pH    POCT pCO2, Venous 47 41 - 51 mm Hg    POCT pO2, Venous 33 (L) 35 - 45 mm Hg    POCT SO2, Venous 61 45 - 75 %    POCT Oxy Hemoglobin, Venous 60.1 45.0 - 75.0 %    POCT Hematocrit Calculated, Venous 41.0 36.0 - 46.0 %    POCT Sodium, Venous 137 136 - 145 mmol/L    POCT Potassium, Venous 2.7 (LL) 3.5 - 5.3 mmol/L    POCT Chloride, Venous 100 98 - 107 mmol/L    POCT Ionized Calicum, Venous 1.13 1.10 - 1.33 mmol/L    POCT Glucose, Venous 147 (H) 74 - 99 mg/dL    POCT Lactate, Venous 2.6 (H) 0.4 - 2.0 mmol/L    POCT Base Excess, Venous 4.3 (H) -2.0 - 3.0 mmol/L    POCT HCO3 Calculated, Venous 29.8 (H) 22.0 - 26.0 mmol/L    POCT Hemoglobin, Venous 13.7 12.0 - 16.0 g/dL    POCT Anion Gap, Venous 10.0 10.0 - 25.0 mmol/L    Patient Temperature 37.0 degrees Celsius   Lipid Panel   Result Value Ref Range    Cholesterol 132 0 - 199 mg/dL    HDL-Cholesterol 8.7 mg/dL    Cholesterol/HDL Ratio 15.2     LDL Calculated 92 <=99 mg/dL    VLDL 31 0 - 40 mg/dL    Triglycerides 155 (H) 0 - 149 mg/dL    Non HDL Cholesterol 123 0 - 149 mg/dL   Hemoglobin A1C   Result Value Ref Range    Hemoglobin A1C 5.8 (H) See comment %    Estimated Average Glucose 120 Not Established mg/dL   B-Type Natriuretic Peptide   Result Value Ref Range     (H) 0 - 99 pg/mL   CBC and Auto Differential   Result Value Ref Range    WBC 14.7 (H) 4.4 - 11.3 x10*3/uL    nRBC 0.0 0.0 - 0.0 /100 WBCs    RBC 4.41 4.00 - 5.20 x10*6/uL    Hemoglobin 12.9 12.0 - 16.0 g/dL    Hematocrit 40.2 36.0 - 46.0 %    MCV 91 80 - 100 fL    MCH 29.3 26.0 - 34.0 pg    MCHC 32.1 32.0 - 36.0 g/dL    RDW 13.6 11.5 - 14.5  %    Platelets 167 150 - 450 x10*3/uL    Neutrophils % 88.7 40.0 - 80.0 %    Immature Granulocytes %, Automated 0.5 0.0 - 0.9 %    Lymphocytes % 5.2 13.0 - 44.0 %    Monocytes % 5.2 2.0 - 10.0 %    Eosinophils % 0.1 0.0 - 6.0 %    Basophils % 0.3 0.0 - 2.0 %    Neutrophils Absolute 13.03 (H) 1.60 - 5.50 x10*3/uL    Immature Granulocytes Absolute, Automated 0.08 0.00 - 0.50 x10*3/uL    Lymphocytes Absolute 0.77 (L) 0.80 - 3.00 x10*3/uL    Monocytes Absolute 0.77 0.05 - 0.80 x10*3/uL    Eosinophils Absolute 0.01 0.00 - 0.40 x10*3/uL    Basophils Absolute 0.05 0.00 - 0.10 x10*3/uL   Magnesium   Result Value Ref Range    Magnesium 2.16 1.60 - 2.40 mg/dL   Hepatic function panel   Result Value Ref Range    Albumin 2.7 (L) 3.4 - 5.0 g/dL    Bilirubin, Total 3.7 (H) 0.0 - 1.2 mg/dL    Bilirubin, Direct 1.8 (H) 0.0 - 0.3 mg/dL    Alkaline Phosphatase 187 (H) 33 - 136 U/L    ALT 64 (H) 7 - 45 U/L    AST 53 (H) 9 - 39 U/L    Total Protein 6.2 (L) 6.4 - 8.2 g/dL   Phosphorus   Result Value Ref Range    Phosphorus 6.7 (H) 2.5 - 4.9 mg/dL   Basic Metabolic Panel   Result Value Ref Range    Glucose 111 (H) 74 - 99 mg/dL    Sodium 137 136 - 145 mmol/L    Potassium 3.7 3.5 - 5.3 mmol/L    Chloride 97 (L) 98 - 107 mmol/L    Bicarbonate 20 (L) 21 - 32 mmol/L    Anion Gap 24 (H) 10 - 20 mmol/L    Urea Nitrogen 67 (H) 6 - 23 mg/dL    Creatinine 3.80 (H) 0.50 - 1.05 mg/dL    eGFR 11 (L) >60 mL/min/1.73m*2    Calcium 8.9 8.6 - 10.6 mg/dL   Magnesium   Result Value Ref Range    Magnesium 1.65 1.60 - 2.40 mg/dL   Renal Function Panel   Result Value Ref Range    Glucose 152 (H) 74 - 99 mg/dL    Sodium 141 136 - 145 mmol/L    Potassium 3.0 (L) 3.5 - 5.3 mmol/L    Chloride 104 98 - 107 mmol/L    Bicarbonate 24 21 - 32 mmol/L    Anion Gap 16 10 - 20 mmol/L    Urea Nitrogen 5 (L) 6 - 23 mg/dL    Creatinine 0.47 (L) 0.50 - 1.05 mg/dL    eGFR >90 >60 mL/min/1.73m*2    Calcium 7.6 (L) 8.6 - 10.6 mg/dL    Phosphorus 3.2 2.5 - 4.9 mg/dL    Albumin  3.4 3.4 - 5.0 g/dL   CBC and Auto Differential   Result Value Ref Range    WBC 10.6 4.4 - 11.3 x10*3/uL    nRBC 0.0 0.0 - 0.0 /100 WBCs    RBC 3.94 (L) 4.00 - 5.20 x10*6/uL    Hemoglobin 11.7 (L) 12.0 - 16.0 g/dL    Hematocrit 35.0 (L) 36.0 - 46.0 %    MCV 89 80 - 100 fL    MCH 29.7 26.0 - 34.0 pg    MCHC 33.4 32.0 - 36.0 g/dL    RDW 13.3 11.5 - 14.5 %    Platelets 165 150 - 450 x10*3/uL    Neutrophils % 90.4 40.0 - 80.0 %    Immature Granulocytes %, Automated 0.3 0.0 - 0.9 %    Lymphocytes % 5.2 13.0 - 44.0 %    Monocytes % 4.0 2.0 - 10.0 %    Eosinophils % 0.0 0.0 - 6.0 %    Basophils % 0.1 0.0 - 2.0 %    Neutrophils Absolute 9.54 (H) 1.60 - 5.50 x10*3/uL    Immature Granulocytes Absolute, Automated 0.03 0.00 - 0.50 x10*3/uL    Lymphocytes Absolute 0.55 (L) 0.80 - 3.00 x10*3/uL    Monocytes Absolute 0.42 0.05 - 0.80 x10*3/uL    Eosinophils Absolute 0.00 0.00 - 0.40 x10*3/uL    Basophils Absolute 0.01 0.00 - 0.10 x10*3/uL   Calcium, ionized   Result Value Ref Range    POCT Calcium, Ionized 1.02 (L) 1.1 - 1.33 mmol/L   Troponin I, High Sensitivity   Result Value Ref Range    Troponin I, High Sensitivity (CMC) 83 (H) 0 - 34 ng/L   POCT GLUCOSE   Result Value Ref Range    POCT Glucose 89 74 - 99 mg/dL       IMAGING:  CT head wo IV contrast; CT angio head and neck w and wo IV contrast  11/12/2024 5:34 am     FINDINGS: NON-CONTRAST HEAD CT:     BRAIN PARENCHYMA:  No evidence of acute intraparenchymal hemorrhage or parenchymal evidence of an acute large territory ischemic infarct. No mass-effect, midline shift or effacement of cerebral sulci. Gray-white matter distinction is preserved. Punctate calcifications within the left-greater-than-right basal ganglia. Lacunar within the posterior limb of the right internal capsule and left basal ganglia. Small amount of encephalomalacia and gliosis within the right occipital lobe.     VENTRICLES and EXTRA-AXIAL SPACES:  No acute extra-axial or intraventricular hemorrhage.  Ventricles and sulci are age-concordant.     PARANASAL SINUSES/MASTOIDS:  No hemorrhage or air-fluid levels within the visualized paranasal sinuses. The mastoids are well aerated.     CALVARIUM/ORBITS:  No skull fracture.  The orbits and globes are intact to the extent visualized.     EXTRACRANIAL SOFT TISSUES: No discernible abnormality.         CTA NECK:   There is calcified and noncalcified plaque along the aortic arch and origins of the great vessels. There is marked narrowing at the origin of the right common carotid artery and right subclavian artery. There is moderate narrowing secondary to noncalcified plaque at the origin of the left subclavian artery.     LEFT VERTEBRAL ARTERY:  No hemodynamically significant stenosis, occlusion, or dissection. The left vertebral artery is dominant.     LEFT COMMON/INTERNAL CAROTID ARTERY:  There is calcified plaque along the course of the common carotid artery with mild-to-moderate narrowing. There is coarse calcification at the carotid bifurcation and along the proximal cervical internal carotid artery with a proximally 65% stenosis by NASCET criteria. The cervical internal carotid artery is patent to the level of the skull base without additional stenosis.     RIGHT VERTEBRAL ARTERY: There is coarse atherosclerotic calcification at the origin of the right vertebral artery. No hemodynamically significant stenosis, occlusion, or dissection.     RIGHT COMMON/INTERNAL CAROTID ARTERY:  There is occlusion of the right common carotid artery just beyond the origin. There is occlusion of the right cervical internal carotid artery.   Limited images through the lung apices demonstrate emphysematous changes. Mild degenerative changes of the cervical spine without evidence of high-grade spinal canal stenosis. Soft tissues of the neck are unremarkable.           CTA HEAD:     ANTERIOR CIRCULATION: No aneurysm.    - Internal Carotid Arteries: There is occlusion of the right petrous  and cavernous internal carotid artery. There is reconstitution within the supraclinoid segment. The left intracranial carotid artery is patent. There is atherosclerotic calcification along the cavernous and supraclinoid ICA with mild narrowing.     - Middle Cerebral Arteries: No hemodynamically significant stenosis or occlusion.     - Anterior Cerebral Arteries: There is and abrupt cutoff of the distal left anterior cerebral artery (series 504, image 82). The right [place] is without hemodynamically significant stenosis or occlusion.       POSTERIOR CIRCULATION: No aneurysm.     - Intracranial Vertebral Arteries:  No hemodynamically significant stenosis or occlusion.     - Basilar Artery:  No hemodynamically significant stenosis or occlusion.     - Posterior Cerebral Arteries:  No hemodynamically significant stenosis or occlusion.       CTA NECK:     Complete occlusion of the right common carotid artery and right cervical internal carotid artery.   Coarse atherosclerotic calcification at the left carotid bifurcation with approximately 65% stenosis by NASCET criteria.   The vertebral arteries are patent. The left vertebral artery is dominant. There is a coarse calcification at the origin of the right vertebral artery.   Calcified and noncalcified plaque along the aortic arch and origins of the great vessels with narrowing.     CTA head:   There is an abrupt cutoff of the distal left anterior cerebral artery.   Occlusion of the right petrous and cavernous carotid arteries with reconstitution in the supraclinoid segment.   The left intracranial carotid artery is patent with coarse atherosclerotic calcification and mild narrowing.   The posterior circulation is patent without significant stenosis.       CT HEAD:   No acute intracranial hemorrhage or mass effect.     ---    CT head wo IV contrast 11/11/2024 9:03 pm     FINDINGS:   CSF Spaces: Ventricles, cortical sulci and basal cisterns are prominent reflecting age related  involutional changes and mild volume loss. Mildly prominent extra-axial CSF space within the bifrontal region may represent asymmetric volume loss. There is no extraaxial fluid collection.     Parenchyma: There is no acute intraparenchymal hemorrhage or parenchymal evidence of acute large territorial ischemic infarction. Patchy white matter hypodensity likely represents microangiopathy. Lucency within the right basal ganglia likely represents a lacunar infarct. Small area of encephalomalacia and gliosis within the right occipital lobe. Of note multiple intracranial vessels are hyperdense, likely secondary to recent IR neuro angiogram. The grey-white differentiation is intact. There is no mass effect or midline shift.     Calvarium: The calvarium is unremarkable.     Paranasal sinuses and mastoids: Visualized paranasal sinuses and mastoids are clear.       No acute intracranial hemorrhage or mass effect.   Small focal area of encephalomalacia and gliosis within the right occipital lobe.   Mild white matter changes compatible with microangiopathy. Lacunar infarct within the right basal ganglia.                Assessment/Plan   This patient currently has cardiac telemetry ordered; if you would like to modify or discontinue the telemetry order, click here to go to the orders activity to modify/discontinue the order.  Assessment & Plan  Cerebrovascular accident (CVA) due to occlusion of left middle cerebral artery (Multi)    Ms. Rao is an 78 yo female with history of stroke in 7/2024 HTN, HLD, R CEA 2011, HFpEF, COPD, anxiety, partial hepatectomy for HCC 2019, mRS 0,  transferred from OSH for acute left MCA infarct with NIH 21, s/p TNK. MT performed, but patient's left had already recanalized with TNK (therefore cannot use Tici Scoring). Thrombectomy complicated by severe diffuse atherosclerosis of vasculature, requiring multiple entry attempts/passes. Access gained via right radial artery. NIH 20 s/p thrombectomy  with RUE/RLE antigravity, improved from prior. Admitting to [facility] for continued monitoring. Initial SBP goal <180, however overnight BP dependant examinations, reportedly revealing deterioration below -180 by several [facility] clinicians raises current recommended SBP goal to 180-200 today. Spontaneous recanalization s/p TNK, NIHSS of 4 this AM demonstrates marked improvement from time of presentation to [facility]. Given permissive HTN and increase in SBP goals, ctm for intracranial hemorrhage, remain NSU status. Patient's  clarified she has been on DAPT since 07/2024, rec initiate antiplatelet therapy 11/12 after 1255. Given the approximately 65% stenosis noted proximal to the left carotid bifurcation, likely plan for L carotid artery stenting within 2 weeks iso complete occlusion of the right common carotid artery and right cervical internal carotid artery.      Type: Ischemic stroke  Subtype/etiology: Artery to artery  Vessels involved: left MCA  Neurological manifestations:  NIHSS (worst at presentation): 21   Antiplatelet/antithrombotic plan for stroke prevention: holding for now  VTE prophylaxis: SCDS    Vascular Risk Factor modification goals:  Blood pressure goals: avoid hypotension SBP <100 that could worsen cerebral perfusion, Ischemic stroke post-thrombectomy   Lipid Goals: education on healthy diet and statin therapy to maintain or achieve goal LDL-cholesterol < 70mg  Glucose Goals: early treatment of hyperglycemia to goal glucose 140-180 mg/dl with long-term goal A1c < 7%   Smoking Cessation and Education  Assessment for Rehabilitation needs   Patient and family education on signs and symptoms of stroke, calling 911, healthy strategies for stroke prevention.       Updates 11/12/2024:  - SBP goals 180-200 due to BP dependant exams, ctn Q1 neuro checks for signs of ICH.  - Initiate Aspirin for antiplatelet therapy, pending MRI brain to assess stroke burden prior to determination of clopidogrel  initiation. Per , pt on DAPT since 07/2024  - MRI brain w/o contrast to evaluate for stroke burden s/p TNK ordered.   - TTE ordered  - PT/OT/SLP evaluation deferred 11/12 due to BP dependant exam, to be completed in future.   - NSU status, NSU is primary team    # Acute Left MCA infarct s/p TNK (11/11 @ 1255) and MT (already recanalized- no Tici scoring)  : : LDL 92; A1c 5.8%  : : NIHSS 21 at time of transfer --> 4 this morning.     : : MRI brain w/o contrast ordered  :: CTA with atherosclerotic calcification at the left carotid bifurcation with approximately 65% stenosis by NASCET criteria  : : TTE ordered   Recommendations & Plan:  - Q1 neuro checks  - BP goal s/p TNK <180/105mm Hg, however allow -200 given BP dependent exams.    - Ctn atorvastatin 80mg  - Pain and Sedation: acetaminophen PRN  - PT/OT, SLP  - Initiate Aspirin for antiplatelet therapy, pending MRI brain to assess stroke burden prior to determination of clopidogrel initiation.   - DVT Ppx: continue SCDs, consider heparin as pt is >24 hours s/p TNK   [ ] Will consider carotid intervention pending MRI to evaluate stroke burden      José Miguel Flores, MS3     I have reviewed and edited the information above and I agree with the assessment and plan as outlined by the medical student.    Corey Domingo,   Neurology, PGY-2

## 2024-11-12 NOTE — PROGRESS NOTES
[facility]  NEUROSCIENCE INTENSIVE CARE UNIT  DAILY PROGRESS NOTE       Patient Name: Zulma Rao   MRN: [id number]     Admit Date: 2024     : 1945 AGE: 79 y.o. GENDER: female        Subjective    79 y.o. female with PMH prior R sided stroke with left sided deficit (per personal review of imaging, L sided posterior limb of internal capsule) 2024, bilateral carotid artery stenosis S/P R CEA , HFpEF, NSTEMI, T2DM, HT, DLD, hypothyroidism, COPD, HCC S/P partial hepatectomy (), anxiety, b/l cataracts.  Admitted 2024 with Cerebrovascular accident (CVA) due to occlusion of left middle cerebral artery (Multi) after transferred for mechanical thrombectomy.     History obtained from  Mynor, who reported that pt was home for lunch today, last known well was 12pm. She was standing in the kitchen, and suddenly started having whole body shaking. He was able to catch her before falling to ground. Noticed that she had left sided facial droop that was new and pt was looking to the left, not speaking or communicating with him. He called 911 and EMS brought her to the [facility] ED. NIH on arrival was 20. BP on arrival was:     CTH completed showing dense left MCA sign. CTA head and neck completed showing proximal left M1 occlusion and what appears to be chronic right ICA occlusion. She was given TNK at 12:55. Life flighted to [facility] for MT pamelaal. NIH on arrival was 21 (breakdown below), BP on arrival was 130/93 with BG 93. Mechanical thrombectomy completed and post NIH score was:  Admitted to NSU.      Pt's  reports history of stroke in July of this year for which she originally had left sided weakness and facial droop that completely resolved and was back to her normal baseline prior to stroke. Was able to ambulate independently, but then fell in September and broke her right hip requiring surgical fixation. She was doing well, walking with walker for long distance but  recently has been ambulating independently.  reported no history of arrhythmias and reports pt was taking daily plavix.     NIHSS Pre Thrombectomy  Level of consciousness: 0 = Alert  LOC Question: 2 = Neither correct  LOC Commands: 2 = Obeys neither  Best Gaze: 2 = Forced deviation  Visual Field: 0 = No visual loss  Facial Paresis: 1 = Minor paresis  LUE: 1 = Drift  RUE: 3 = No effort vs gravity  LLE: 1 = Drift  RLE: 3 = No effort vs gravity  Dysarthria: 2 = Unintelligible  Best Language: 3 = Mute  Limb Ataxia: 0 = Absent  Sensory: 1 = Partial loss  Neglect: 0 = None     NIHSS Score: 21        NIHSS Post Thrombectomy:  NIHSS  Level of consciousness: 1 = Drowsy  LOC Question: 2 = Neither correct  LOC Commands: 2 = Obeys neither  Best Gaze: 2 = Forced deviation  Visual Field: 0 = No visual loss  Facial Paresis: 1 = Minor paresis  LUE: 1 = Drift  RUE: 2 = Effort vs gravity  LLE: 2 = Effort vs gravity  RLE: 2 = Effort vs gravity  Dysarthria: 2 = Unintelligible  Best Language: 3 = Mute  Limb Ataxia: 0 = Absent  Sensory: 0 = Absent  Neglect: 0 = None     NIHSS Score: 20    Significant Events:  - 11/11 TNK and spontaneous recanalization on angiogram TICI 3    Interval Events:   Overnight: Pt became aphasic, not following on the right, found to have SBPs 90s. Repeat CTH stable. SBP improved to 170s with stimulation and exam improved. Started on NS IVF 50ml/hr. Have fluctuating exam with aphasia, L gaze preference and not following commands when -120s. A-line placed. Given a total of 120mcg phenylephrine then continuous infusion, 1L NS bolus and exam improved. Discussed with stroke fellow, continue SBP goal of 160-180. Repeat CTH, CTA showed complete occlusion of R CCA, cervical R ICA, likely chronic, abrupt curoff of the distal L DAKOTA with no evidence of hypoattenuation.       Objective   VITALS (24H):  Temp:  [36.3 °C (97.3 °F)-36.4 °C (97.5 °F)] 36.3 °C (97.3 °F)  Heart Rate:  [] 89  Resp:  [11-32]  20  BP: ()/() 101/47  Arterial Line BP 1: (143-221)/(43-74) 191/60  INTAKE/OUTPUT:  Intake/Output Summary (Last 24 hours) at 11/12/2024 0845  Last data filed at 11/12/2024 0700  Gross per 24 hour   Intake 1902.42 ml   Output 2831 ml   Net -928.58 ml     VENT SETTINGS:        PHYSICAL EXAM:  NEURO:   - Alert, oriented x2, follows simple commands  - EOS, PERRL, EOMI, VFF  - SANTOS 3/5 strength  CV:  - RRR on telemetry, NSR  - Unable to palpate or doppler pulse in both legs  RESP:  - No signs of resp distress  - Oxygen: Nasal cannula 2L  :  - External catheter in place  GI:  - Abdomen NT/ND, soft  SKIN:  - Intact    11/12/24 @ 8:00AM  NIHSS:   - Level of consciousness: 0 = Alert  - LOC Question: 2 = Neither correct  - LOC Commands: 0 = Obeys both  - Best Gaze: 0 = Normal  - Visual Field: 0 = No visual loss  - Facial Paresis: 0 = Normal  - LUE: 0 = No drift; 10 sec  - RUE: 0 = No drift; 10 sec  - LLE: 0 = No drift; 5 sec  - RLE: 0 = No drift; 5 sec  - Limb Ataxia: 0 = Absent  - Sensory: 0 = Absent  - Best Language: 2 = Severe aphasia  - Dysarthria: 2 = Unintelligible  - Neglect: 0 = None  TOTAL: 6       MEDICATIONS:  Scheduled: PRN: Continuous:   atorvastatin, 80 mg, oral, Nightly  azithromycin, 250 mg, oral, Once per day on Monday Wednesday Friday  busPIRone, 10 mg, oral, TID  tiotropium, 2 puff, inhalation, Daily   And  fluticasone furoate-vilanteroL, 1 puff, inhalation, Daily  insulin lispro, 0-5 Units, subcutaneous, TID AC  levothyroxine, 25 mcg, oral, Daily  lidocaine, 1 patch, transdermal, Daily  magnesium sulfate, 4 g, intravenous, Once  melatonin, 3 mg, oral, Nightly  pantoprazole, 40 mg, oral, Daily before breakfast  perflutren lipid microspheres, 0.5-10 mL of dilution, intravenous, Once in imaging  phenylephrine, , ,   polyethylene glycol, 17 g, oral, Daily  sennosides, 2 tablet, oral, BID     PRN medications: acetaminophen **OR** acetaminophen, calcium gluconate, calcium gluconate, dextrose,  dextrose, glucagon, glucagon, hydrALAZINE **FOLLOWED BY** [START ON 11/13/2024] hydrALAZINE, ipratropium-albuteroL, labetaloL, magnesium sulfate, magnesium sulfate, ondansetron ODT **OR** ondansetron, oxygen, phenylephrine, potassium chloride CR **OR** potassium chloride, potassium chloride CR **OR** potassium chloride, potassium chloride norepinephrine, 0.01-1 mcg/kg/min, Last Rate: 0.02 mcg/kg/min (11/12/24 0700)  phenylephrine, 0-2 mcg/kg/min, Last Rate: 1.5 mcg/kg/min (11/12/24 0715)  sodium chloride 0.9%, 50 mL/hr, Last Rate: 50 mL/hr (11/11/24 2201)         LAB RESULTS:  Results from last 72 hours   Lab Units 11/12/24  0045 11/11/24  1757   GLUCOSE mg/dL 152* 111*   SODIUM mmol/L 141 137   POTASSIUM mmol/L 3.0* 3.7   CHLORIDE mmol/L 104 97*   CO2 mmol/L 24 20*   ANION GAP mmol/L 16 24*   BUN mg/dL 5* 67*   CREATININE mg/dL 0.47* 3.80*   EGFR mL/min/1.73m*2 >90 11*   CALCIUM mg/dL 7.6* 8.9   PHOSPHORUS mg/dL 3.2 6.7*   ALBUMIN g/dL 3.4 2.7*   MAGNESIUM mg/dL 1.65 2.16   POCT CALCIUM IONIZED (MMOL/L) IN BLOOD mmol/L 1.02*  --       Results from last 72 hours   Lab Units 11/12/24  0045 11/11/24  1757   WBC AUTO x10*3/uL 10.6 14.7*   NRBC AUTO /100 WBCs 0.0 0.0   RBC AUTO x10*6/uL 3.94* 4.41   HEMOGLOBIN g/dL 11.7* 12.9   HEMATOCRIT % 35.0* 40.2   MCV fL 89 91   MCH pg 29.7 29.3   MCHC g/dL 33.4 32.1   RDW % 13.3 13.6   PLATELETS AUTO x10*3/uL 165 167   NEUTROS PCT AUTO % 90.4 88.7   IG PCT AUTO % 0.3 0.5   LYMPHS PCT AUTO % 5.2 5.2   MONOS PCT AUTO % 4.0 5.2   EOS PCT AUTO % 0.0 0.1   BASOS PCT AUTO % 0.1 0.3   NEUTROS ABS x10*3/uL 9.54* 13.03*   IG AUTO x10*3/uL 0.03 0.08   LYMPHS ABS AUTO x10*3/uL 0.55* 0.77*   MONOS ABS AUTO x10*3/uL 0.42 0.77   EOS ABS AUTO x10*3/uL 0.00 0.01   BASOS ABS AUTO x10*3/uL 0.01 0.05      Results from last 72 hours   Lab Units 11/12/24  0045 11/11/24  1757   ALK PHOS U/L  --  187*   BILIRUBIN TOTAL mg/dL  --  3.7*   BILIRUBIN DIRECT mg/dL  --  1.8*   PROTEIN TOTAL g/dL  --  6.2*    ALT U/L  --  64*   AST U/L  --  53*   ALBUMIN g/dL 3.4 2.7*      IMAGING RESULTS:  Reviewed OSH imaging on PACS  CT head wo IV contrast    Result Date: 11/12/2024  CTA neck:   Complete occlusion of the right common carotid artery and right cervical internal carotid artery.   Coarse atherosclerotic calcification at the left carotid bifurcation with approximately 65% stenosis by NASCET criteria.   The vertebral arteries are patent. The left vertebral artery is dominant. There is a coarse calcification at the origin of the right vertebral artery.   Calcified and noncalcified plaque along the aortic arch and origins of the great vessels with narrowing.   CTA head:   There is an abrupt cutoff of the distal left anterior cerebral artery.   Occlusion of the right petrous and cavernous carotid arteries with reconstitution in the supraclinoid segment.   The left intracranial carotid artery is patent with coarse atherosclerotic calcification and mild narrowing.   The posterior circulation is patent without significant stenosis.   CT head:   No acute intracranial hemorrhage or mass effect.   I personally reviewed the images/study and I agree with the findings as stated by Simone Joy MD (Radiology Resident).   MACRO: Simone Joy discussed the significance and urgency of this critical finding by telephone with  Jeaneth Graves MD on 11/12/2024 at 5:59 am.  (**-RCF-**) Findings:  See findings.   .   Signed by: Latesha Zuniga 11/12/2024 7:20 AM Dictation workstation:   [id number]    CT angio head and neck w and wo IV contrast    Result Date: 11/12/2024  CTA neck:   Complete occlusion of the right common carotid artery and right cervical internal carotid artery.   Coarse atherosclerotic calcification at the left carotid bifurcation with approximately 65% stenosis by NASCET criteria.   The vertebral arteries are patent. The left vertebral artery is dominant. There is a coarse calcification at the  origin of the right vertebral artery.   Calcified and noncalcified plaque along the aortic arch and origins of the great vessels with narrowing.   CTA head:   There is an abrupt cutoff of the distal left anterior cerebral artery.   Occlusion of the right petrous and cavernous carotid arteries with reconstitution in the supraclinoid segment.   The left intracranial carotid artery is patent with coarse atherosclerotic calcification and mild narrowing.   The posterior circulation is patent without significant stenosis.   CT head:   No acute intracranial hemorrhage or mass effect.   I personally reviewed the images/study and I agree with the findings as stated by Simone Joy MD (Radiology Resident).   MACRO: Simone Joy discussed the significance and urgency of this critical finding by telephone with  Jeaneth Graves MD on 11/12/2024 at 5:59 am.  (**-RCF-**) Findings:  See findings.   .   Signed by: Latesha Zuniga 11/12/2024 7:20 AM Dictation workstation:   [id number]    CT head wo IV contrast    Result Date: 11/12/2024  No acute intracranial hemorrhage or mass effect.   Small focal area of encephalomalacia and gliosis within the right occipital lobe.   Mild white matter changes compatible with microangiopathy. Lacunar infarct within the right basal ganglia.   I personally reviewed the images/study and I agree with the findings as stated by Simone Joy MD (Radiology Resident).   MACRO: None   Signed by: Latesha Zuniga 11/12/2024 7:03 AM Dictation workstation:   [id number]       Assessment/Plan    Update 11/12/24  :: CTH (11/12): No acute intracranial hemorrhage or mass effect  :: CTA HN (11/12): complete occlusion of R CCA and R cervical ICA. 65% stenosis of L Carotid bifurcation, abrupt cutoff of the distal L DAKOTA, occlusion of the right petrous and cavernous carotid arteries  - Stoke following, per note: Maintain -180s.  - Vasc Sx following, per note: no acute  intervention . Good flow. No indication of hematoma or pseudoaneurysm, per communication: CTA aorta-iliofemoral with runoff   - Consider restarting ASA, DVT prophylaxis  - c/w NSS 50ml/hr  - c/w levo and amos for BP    To follow  - BP goal  - TTE  - MRI brain  - SLP  - CTA aorta-iliofemoral with runoff     Dispo:  Stroke  -----    NEURO:  # L MCA stroke S/P TNK, Spontaneous recanalization TICI 3  # R sided stroke with left sided deficit (per personal review of imaging L sided posterior limb of internal capsule) 7/2024  # Bilateral carotid artery stenosis S/P R CEA 2011  Assessment:  - Neurologically: see exam above  - NIHSS on admission   - Fluctuating exams  - LDL (11/11) 92, A1c (11/11) 5.8  - BNP (11/11) 188, Trop pending  Plan:  - NSU  - Neuro Checks: Q1H  - Sedation: None  - Pain: acetaminophen PRN, Q6H (hold until pass SLP)  - Nausea: ondansetron and None  - PT/OT/SLP  - c/w home Atorvastatin 80mg  - Hold home ASA, plavix 75mg daily   - Plan to restart ASA today, DVT prophylaxis after 1pm  - c/w levo and amos for BP  - MRI brain    CARDIOVASCULAR:  #Possible radial intimal injury during catheter removal   # Loss of pulse in both feet  # NSTEMI  # HFpEF  # HT  # DLD  Assessment:  - EKG: pending  - ECHO (9/5/24 at OSH): LVEF 72, RVSP unable to assess  Plan:  - Continue to monitor on telemetry  - BP goal: BP < 180/105    --> PRN: Labetalol and Hydralazine   - TTE pending  - c/w monitor doppler  - Vasc Sx following, per note: no acute intervention . Good flow. No indication of hematoma or pseudoaneurysm, per communication: CTA aorta-iliofemoral with runoff     RESPIRATORY:  # COPD  Assessment:  - PFT (10/05/2023 at OSH): FEV1 50% predicted. FEV1/FVC 69%. No TLC obtained. DLCO 40%   Plan:  - Continuous pulse oximetry   - O2 PRN to maintain SpO2 > 88%, wean as tolerated  - Incentive spirometry while awake  - Continue Trelegy Ellipta (change to inpatient formula)    RENAL/:  #JAIME  Assessment:  - Baseline BUN/Cr:  12/0.63  Results from last 72 hours   Lab Units 24  0045 24  1757   BUN mg/dL 5* 67*   CREATININE mg/dL 0.47* 3.80*       - likely from contrast  Plan:  - Monitor with daily RFP  - Maintain external urinary diversion device for strict I&O  - c/w NSS 50ml/hr    FEN/GI:  # HCC S/P partial hepatectomy (2019)  Assessment:  - Last BM: PTA  Plan:  - Monitor and replace electrolytes per protocol  - IVF: None  - Diet: NPO   - Bowel Regimen: Docusate-Senna BID and Miralax QD    ENDOCRINE:  # T2DM  # Hypothyroidism  Assessment:  Results from last 7 days   Lab Units 24  1351   POCT GLUCOSE mg/dL  --   --  130*   GLUCOSE mg/dL 152* 111*  --    - HbA1C: 6.3 2024   Plan:  - Accuchecks & ISS Q4H   - Hold home metformin  - Hypoglycemia monitoring     HEMATOLOGY:  #JOSE  Assessment:  - Baseline Hgb: 11.6  - Baseline Plts: 258  Results from last 7 days   Lab Units 24  0045 24  1757   HEMOGLOBIN g/dL 11.7* 12.9   HEMATOCRIT % 35.0* 40.2   PLATELETS AUTO x10*3/uL 165 167     Results from last 72 hours   Lab Units 24  1757   WBC AUTO x10*3/uL 10.6 14.7*   NRBC AUTO /100 WBCs 0.0 0.0   RBC AUTO x10*6/uL 3.94* 4.41   HEMOGLOBIN g/dL 11.7* 12.9   HEMATOCRIT % 35.0* 40.2   MCV fL 89 91   MCH pg 29.7 29.3   MCHC g/dL 33.4 32.1   RDW % 13.3 13.6   PLATELETS AUTO x10*3/uL 165 167      Plan:  - Continue to monitor with daily CBC and Coag panel    INFECTIOUS DISEASE:  #JOSE  Assessment:  Results from last 7 days   Lab Units 24  0045 24  1757   WBC AUTO x10*3/uL 10.6 14.7*    - Temp (24hrs), Av.3 °C (97.4 °F), Min:36.3 °C (97.3 °F), Max:36.4 °C (97.5 °F)     Plan:  - Continue to monitor for s/sx of infection  - Pan culture for temperature > 38.4 C    MUSCULOSKELETAL:  - No acute issues    SKIN:  - No acute issues  - Turns and skin care per NSU protocol    ACCESS:  - PIV    PROPHYLAXIS:  - DVT Ppx: Pharmacological DVT ppx on hold until 24hr after TNK  due to risk of bleeding  - GI Ppx: Not indicated    RESTRAINTS:  Not indicated/Patient does not meet criteria for restraints    Lashanda Handley MD PhD  Neurology resident, PGY-2    The patient is critically ill with acute left MCA stroke  Neurologically improved after thrombolysis  Waxing and waning exam may be perfusion dependent: keep sbp>160  Secondary stroke prophylaxis per stroke team  Vascular surgery regarding concern for PVD  Chronic diastolic heart failure  COPD: Cont inhalers  Diabetes: Cont BG control  Swallow evaluation    I have seen and examined the patient.  I have reviewed the patient's laboratory, radiographic, and clinical data.  I have spent 30 minutes providing critical care for the patient.      CARISSA Santo M.D.

## 2024-11-12 NOTE — PROGRESS NOTES
Communication Note    Patient Name: Zulma Rao  MRN: [id number]  Today's Date: 11/12/2024   Room: 09/09-A    Discipline: Occupational Therapy      Missed Visit: Yes  Missed Visit Reason:  (Per MD, awaiting further scans for c/f femoral dissection in R LE. Awaiting further work-up and medical plans. Will defer OT at this time.)      11/12/24 at 8:57 AM   Marii Yeager OT   Rehab Office: [phone]

## 2024-11-12 NOTE — PROGRESS NOTES
Communication Note    Patient Name: Zulma Roa  MRN: [id number]  Today's Date: 11/12/2024   Room: 09/09-A    Discipline: Physical Therapy      Missed Visit: Yes  Missed Visit Reason:  (Pt with pressure dependent exam and with concern for femoral artery dissection, pending further plans. Hold PT at this time.)      11/12/24 at 8:58 AM   Nereida Sanchez PT   Rehab Office: [phone]

## 2024-11-12 NOTE — PROGRESS NOTES
Pharmacy Medication History Review    Zulma Rao is a 79 y.o. female admitted for Cerebrovascular accident (CVA) due to occlusion of left middle cerebral artery (Multi). Pharmacy reviewed the patient's [id number] medications and allergies for accuracy.    Medications ADDED:  ALL MEDICATIONS LISTED   Medications CHANGED:  None  Medications REMOVED:   None    The list below reflects the updated PTA list.   Prior to Admission Medications   Prescriptions Last Dose Informant   acetaminophen (Tylenol 8 HOUR) 650 mg ER tablet Unknown Child, Other   Sig: Take 1 tablet (650 mg) by mouth every 12 hours if needed for mild pain (1 - 3). Do not crush, chew, or split.   aspirin 81 mg EC tablet Unknown Child, Other   Sig: Take 1 tablet (81 mg) by mouth once daily.   atorvastatin (Lipitor) 80 mg tablet Unknown Child, Other   Sig: Take 1 tablet (80 mg) by mouth once daily.   azithromycin (Zithromax) 250 mg tablet Unknown Child, Other   Sig: Take 1 tablet (250 mg) by mouth. Three times a week ( Monday , Wednesday and Friday )   busPIRone (Buspar) 10 mg tablet Unknown Child, Other   Sig: Take 1 tablet (10 mg) by mouth 3 times a day.   cloNIDine (Catapres) 0.1 mg tablet Unknown Child, Other   Sig: Take 1 tablet (0.1 mg) by mouth 2 times a day as needed (if SPB > 170).   clopidogrel (Plavix) 75 mg tablet Unknown Child, Other   Sig: Take 1 tablet (75 mg) by mouth once daily.   ergocalciferol (Vitamin D-2) 1.25 MG (78662 UT) capsule Unknown Child, Other   Sig: Take 1 capsule (1,250 mcg) by mouth 1 (one) time per week.   fluticasone-umeclidin-vilanter (Trelegy Ellipta) 100-62.5-25 mcg blister with device Unknown Child, Other   Sig: Inhale 1 puff once daily.   At the same time each day   furosemide (Lasix) 20 mg tablet Unknown Child, Other   Sig: Take 1 tablet (20 mg) by mouth once daily.   ipratropium-albuteroL (Duo-Neb) 0.5-2.5 mg/3 mL nebulizer solution Unknown Child, Other   Sig: Take 3 mL by nebulization 4 times a day as  needed for wheezing or shortness of breath.   levothyroxine (Synthroid, Levoxyl) 25 mcg tablet Unknown Child, Other   Sig: Take 1 tablet (25 mcg) by mouth early in the morning.. Take on an empty stomach at the same time each day, either 30 to 60 minutes prior to breakfast   melatonin 3 mg tablet Unknown Child, Other   Sig: Take 1 tablet (3 mg) by mouth once daily at bedtime.   pantoprazole (ProtoNix) 40 mg EC tablet Unknown Child, Other   Sig: Take 1 tablet (40 mg) by mouth once daily in the morning. Take before meals. Do not crush, chew, or split.      Facility-Administered Medications: None        The list below reflects the updated allergy list. Please review each documented allergy for additional clarification and justification.  Allergies  Reviewed by Juana Maynard on 11/11/2024        Severity Reactions Comments    Amoxicillin Not Specified Unknown     Avelox [moxifloxacin] Not Specified Unknown     Doxycycline Not Specified Unknown     Sulfa (sulfonamide Antibiotics) Not Specified Unknown     Vicodin [hydrocodone-acetaminophen] Not Specified Unknown             Patient declines M2B at discharge.     Sources:   [facility] - 11/01/2024 Oxycodone Hcl ( Ir ) 5 mg tablet 20 # / 5 days                     -  10/21/2024 Oxycodone Hcl ( Ir ) 5 mg tablet 20 # / 5 days                     -   10/10/2024 Oxycodone Hcl ( Ir ) 5 mg tablet 20 # / 5 days   Phone call with patient daughter ( Alecia Naranjo [phone])   Patient dispense hx   Chart Review  Care Everywhere  09/11/2024 Discharge Summary with Demetrio Forbes MD   [facility] Clinical Summary generated on 11/11/2024     Additional Comments:  Patient unable to be interviewed. Patients PTA list was created using [facility] Clinical Summary generated on 11/11/2024 , this summary was compared to patients recent dispense hx , 09/11/2024 discharge summary , and also verified with patients daughter via phone call.     09/11/2024 Discharge Summary states for  "patient to start taking Enoxaparin 40 mg / 0.4 mL injections , Mometasone-Formoterol 100-5 mcg /actuation inhaler , and Spiriva Respimat 2.5 mcg/ actuation inhaler however these medications have no evidence of dispense history , it is unclear if patient ever started these medications. Patients daughter was unable to confirm or deny start of these medications.        Patients daughter is a reliable historian , appears patient is adherent to current home medications.           Juana Maynard  Pharmacy Technician  11/11/24     Secure Chat preferred   If no response call [phone] or Vocera \"Med Rec\"   "

## 2024-11-12 NOTE — PROGRESS NOTES
Speech-Language Pathology    SLP Adult Inpatient Speech-Language Pathology Clinical Swallow Evaluation    Patient Name: Zulma Rao  MRN: [id number]  Today's Date: 11/12/2024   Time In: 927  Time Out: 940    Assessment:  -Suspected pharyngeal dysphagia s/p acute L MCA; evaluation limited by alertness/ability to participate   -High risk for aspiration w/ PO intake is apparent     Recommendations:  NPO  Consider an alternative means of nutrition/hydration/medication support.    Frequent, aggressive oral care as tolerated to improve infection control    OK for small amounts of ice chips (one at a time, 5x/hour) for oral comfort and to prevent swallow disuse atrophy, but only after aggressive oral care to avoid colonization of bacteria within the oral cavity.     Pt may require instrumental assessment of swallowing prior to initiation of PO diet.  Will continue to monitor to determine readiness.          Plan:  SLP Plan: Skilled SLP  SLP Frequency: 2x per week  Duration: 2-3 weeks  Discussed POC: Pt, medical team      Goals:  Pt will demonstrate adequate oral phase and initiate effortful swallows with small ice chips x10 reps.   Date initiated: 11/12/24   Expected Time Frame to Meet Goal: 2-3 weeks    Status: Goal initiated     Pt will participate in ongoing dysphagia assessment including the 3 oz Swallow Protocol with no overt indicators of aspiration on 100% of attempts.    Date initiated: 11/12/24   Expected Time Frame to Meet Goal: 2-3 weeks    Status: Goal initiated     Pt/family will indicate understanding of dysphagia education: risk for aspiration, recommendations, and POC with > 80% accuracy via teach back method.   Date initiated: 11/12/24   Expected Time Frame to Meet Goal: 2-3 weeks    Status: Goal initiated         Subjective   RN cleared pt for evaluation.  Pt properly identified and evaluated bedside.  Awake, but required mod cues to participate throughout session.  Room air.  No family present.   Frequent grimacing, but unable to indicate presence or absence of pain.     Pt admitted w/ an acute L MCA CVA; s/p TNK and MT.         Objective     Oral/Motor Assessment:  Oral Hygiene: WFL  Dentition: Upper and lower dentures   Oral Motor: Limited assessment given pt's ability to participate; no overt deficits   Voice (Perceptually): Weak   Volitional Cough (Perceptually): Unable to follow commands to initiate   Volitional Swallow: Unable to follow commands to initiate            Consistencies Trialed:  Ice chips, sips of water.  Not appropriate for additional trials given apparent severity of swallowing deficits.         Clinical Observations:  Patient Positioning: Upright in Bed  Management of Oral Secretions: WFL  Was The 3 oz Swallow Protocol Completed: No (Deferred at this time 2' to pt performance  w/ single ice chips and sips)       Clinical bedside swallow evaluation completed.  Pt presented with small, single ice chips x2.  Reduced/prolonged bolus manipulation.  No s/s of aspiration.  Pt also participated in trials of single sips of thin liquid via spoon x2 straw x2. Functional oral phase; no ant spillage.  No s/s of aspiration w spoon trials.  Multiple swallows and delayed weak cough w/ trials via straw.  PO trials ceased due to concern for pharyngeal dysphagia/aspiration.      Education:  Education provided to patient regarding NPO recommendations, allowance of ice chips, importance of oral care, and overall dysphagia plan of care.  Reduced understanding indicated.  Discussed pt status w/ RN.             11/12/24 at 3:16 PM - Nithya Trotter, SLP

## 2024-11-12 NOTE — CARE PLAN
Problem: General Stroke  Goal: Demonstrate improvement in neurological exam throughout the shift  Outcome: Not Progressing  Goal: Maintain BP within ordered limits throughout shift  Outcome: Not Progressing     Problem: Safety - Adult  Goal: Free from fall injury  Outcome: Progressing     Problem: Fall/Injury  Goal: Not fall by end of shift  Outcome: Progressing  Goal: Be free from injury by end of the shift  Outcome: Progressing     Problem: Pain  Goal: Turns in bed with improved pain control throughout the shift  Outcome: Progressing     Problem: General Stroke  Goal: Participate in treatment (ie., meds, therapy) throughout shift  Outcome: Progressing  Goal: No symptoms of aspiration throughout shift  Outcome: Progressing  Goal: Controlled blood glucose throughout shift  Outcome: Progressing     Problem: ICU Stroke  Goal: Maintain patent airway throughout shift  Outcome: Progressing  Goal: Achieve/maintain targeted sodium level throughout shift  Outcome: Progressing

## 2024-11-13 ENCOUNTER — APPOINTMENT (OUTPATIENT)
Dept: NEUROLOGY | Facility: HOSPITAL | Age: 79
DRG: 038 | End: 2024-11-13
Payer: MEDICARE

## 2024-11-13 ENCOUNTER — APPOINTMENT (OUTPATIENT)
Dept: CARDIOLOGY | Facility: HOSPITAL | Age: 79
DRG: 038 | End: 2024-11-13
Payer: MEDICARE

## 2024-11-13 ENCOUNTER — APPOINTMENT (OUTPATIENT)
Dept: RADIOLOGY | Facility: HOSPITAL | Age: 79
DRG: 038 | End: 2024-11-13
Payer: MEDICARE

## 2024-11-13 LAB
ALBUMIN SERPL BCP-MCNC: 3.2 G/DL (ref 3.4–5)
ANION GAP SERPL CALC-SCNC: 12 MMOL/L (ref 10–20)
AORTIC VALVE PEAK VELOCITY: 1.45 M/S
AV PEAK GRADIENT: 8 MMHG
AVA (PEAK VEL): 1.46 CM2
BASOPHILS # BLD AUTO: 0.08 X10*3/UL (ref 0–0.1)
BASOPHILS NFR BLD AUTO: 0.6 %
BODY SURFACE AREA: 1.51 M2
BUN SERPL-MCNC: 4 MG/DL (ref 6–23)
CA-I BLD-SCNC: 1.01 MMOL/L (ref 1.1–1.33)
CALCIUM SERPL-MCNC: 7.6 MG/DL (ref 8.6–10.6)
CHLORIDE SERPL-SCNC: 105 MMOL/L (ref 98–107)
CO2 SERPL-SCNC: 28 MMOL/L (ref 21–32)
CREAT SERPL-MCNC: 0.38 MG/DL (ref 0.5–1.05)
EGFRCR SERPLBLD CKD-EPI 2021: >90 ML/MIN/1.73M*2
EJECTION FRACTION APICAL 4 CHAMBER: 78.7
EJECTION FRACTION: 68 %
EOSINOPHIL # BLD AUTO: 0.15 X10*3/UL (ref 0–0.4)
EOSINOPHIL NFR BLD AUTO: 1.1 %
ERYTHROCYTE [DISTWIDTH] IN BLOOD BY AUTOMATED COUNT: 13.7 % (ref 11.5–14.5)
GLUCOSE BLD MANUAL STRIP-MCNC: 114 MG/DL (ref 74–99)
GLUCOSE BLD MANUAL STRIP-MCNC: 125 MG/DL (ref 74–99)
GLUCOSE BLD MANUAL STRIP-MCNC: 141 MG/DL (ref 74–99)
GLUCOSE SERPL-MCNC: 125 MG/DL (ref 74–99)
HCT VFR BLD AUTO: 32.7 % (ref 36–46)
HGB BLD-MCNC: 11.4 G/DL (ref 12–16)
IMM GRANULOCYTES # BLD AUTO: 0.06 X10*3/UL (ref 0–0.5)
IMM GRANULOCYTES NFR BLD AUTO: 0.4 % (ref 0–0.9)
LEFT ATRIUM VOLUME AREA LENGTH INDEX BSA: 48 ML/M2
LEFT VENTRICLE INTERNAL DIMENSION DIASTOLE: 3.5 CM (ref 3.5–6)
LEFT VENTRICULAR OUTFLOW TRACT DIAMETER: 1.8 CM
LYMPHOCYTES # BLD AUTO: 2.21 X10*3/UL (ref 0.8–3)
LYMPHOCYTES NFR BLD AUTO: 15.6 %
MAGNESIUM SERPL-MCNC: 2.42 MG/DL (ref 1.6–2.4)
MCH RBC QN AUTO: 30.2 PG (ref 26–34)
MCHC RBC AUTO-ENTMCNC: 34.9 G/DL (ref 32–36)
MCV RBC AUTO: 87 FL (ref 80–100)
MITRAL VALVE E/A RATIO: 2
MONOCYTES # BLD AUTO: 1.69 X10*3/UL (ref 0.05–0.8)
MONOCYTES NFR BLD AUTO: 11.9 %
NEUTROPHILS # BLD AUTO: 10.02 X10*3/UL (ref 1.6–5.5)
NEUTROPHILS NFR BLD AUTO: 70.4 %
NRBC BLD-RTO: 0 /100 WBCS (ref 0–0)
PHOSPHATE SERPL-MCNC: 2 MG/DL (ref 2.5–4.9)
PLATELET # BLD AUTO: 186 X10*3/UL (ref 150–450)
POTASSIUM SERPL-SCNC: 3.3 MMOL/L (ref 3.5–5.3)
RBC # BLD AUTO: 3.78 X10*6/UL (ref 4–5.2)
RIGHT VENTRICLE FREE WALL PEAK S': 12.8 CM/S
SODIUM SERPL-SCNC: 142 MMOL/L (ref 136–145)
TRICUSPID ANNULAR PLANE SYSTOLIC EXCURSION: 2 CM
WBC # BLD AUTO: 14.2 X10*3/UL (ref 4.4–11.3)

## 2024-11-13 PROCEDURE — 2500000005 HC RX 250 GENERAL PHARMACY W/O HCPCS

## 2024-11-13 PROCEDURE — 70551 MRI BRAIN STEM W/O DYE: CPT | Performed by: RADIOLOGY

## 2024-11-13 PROCEDURE — 99291 CRITICAL CARE FIRST HOUR: CPT

## 2024-11-13 PROCEDURE — 97535 SELF CARE MNGMENT TRAINING: CPT | Mod: GO

## 2024-11-13 PROCEDURE — 82947 ASSAY GLUCOSE BLOOD QUANT: CPT

## 2024-11-13 PROCEDURE — 37799 UNLISTED PX VASCULAR SURGERY: CPT

## 2024-11-13 PROCEDURE — 83735 ASSAY OF MAGNESIUM: CPT

## 2024-11-13 PROCEDURE — 80069 RENAL FUNCTION PANEL: CPT

## 2024-11-13 PROCEDURE — 92507 TX SP LANG VOICE COMM INDIV: CPT | Mod: GN

## 2024-11-13 PROCEDURE — 70496 CT ANGIOGRAPHY HEAD: CPT | Performed by: RADIOLOGY

## 2024-11-13 PROCEDURE — 2500000004 HC RX 250 GENERAL PHARMACY W/ HCPCS (ALT 636 FOR OP/ED)

## 2024-11-13 PROCEDURE — 93005 ELECTROCARDIOGRAM TRACING: CPT

## 2024-11-13 PROCEDURE — 92526 ORAL FUNCTION THERAPY: CPT | Mod: GN

## 2024-11-13 PROCEDURE — 82330 ASSAY OF CALCIUM: CPT

## 2024-11-13 PROCEDURE — 99232 SBSQ HOSP IP/OBS MODERATE 35: CPT

## 2024-11-13 PROCEDURE — P9045 ALBUMIN (HUMAN), 5%, 250 ML: HCPCS | Mod: JZ | Performed by: STUDENT IN AN ORGANIZED HEALTH CARE EDUCATION/TRAINING PROGRAM

## 2024-11-13 PROCEDURE — 97166 OT EVAL MOD COMPLEX 45 MIN: CPT | Mod: GO

## 2024-11-13 PROCEDURE — 2500000005 HC RX 250 GENERAL PHARMACY W/O HCPCS: Performed by: REGISTERED NURSE

## 2024-11-13 PROCEDURE — 84100 ASSAY OF PHOSPHORUS: CPT

## 2024-11-13 PROCEDURE — 70498 CT ANGIOGRAPHY NECK: CPT | Performed by: RADIOLOGY

## 2024-11-13 PROCEDURE — 2500000004 HC RX 250 GENERAL PHARMACY W/ HCPCS (ALT 636 FOR OP/ED): Performed by: STUDENT IN AN ORGANIZED HEALTH CARE EDUCATION/TRAINING PROGRAM

## 2024-11-13 PROCEDURE — 97162 PT EVAL MOD COMPLEX 30 MIN: CPT | Mod: GP

## 2024-11-13 PROCEDURE — 2020000001 HC ICU ROOM DAILY

## 2024-11-13 PROCEDURE — 85025 COMPLETE CBC W/AUTO DIFF WBC: CPT

## 2024-11-13 PROCEDURE — 2500000004 HC RX 250 GENERAL PHARMACY W/ HCPCS (ALT 636 FOR OP/ED): Performed by: REGISTERED NURSE

## 2024-11-13 PROCEDURE — 93010 ELECTROCARDIOGRAM REPORT: CPT | Performed by: INTERNAL MEDICINE

## 2024-11-13 PROCEDURE — 2500000005 HC RX 250 GENERAL PHARMACY W/O HCPCS: Performed by: STUDENT IN AN ORGANIZED HEALTH CARE EDUCATION/TRAINING PROGRAM

## 2024-11-13 PROCEDURE — 94640 AIRWAY INHALATION TREATMENT: CPT

## 2024-11-13 PROCEDURE — 2500000001 HC RX 250 WO HCPCS SELF ADMINISTERED DRUGS (ALT 637 FOR MEDICARE OP)

## 2024-11-13 PROCEDURE — 2550000001 HC RX 255 CONTRASTS: Performed by: PSYCHIATRY & NEUROLOGY

## 2024-11-13 PROCEDURE — 70498 CT ANGIOGRAPHY NECK: CPT

## 2024-11-13 PROCEDURE — 70551 MRI BRAIN STEM W/O DYE: CPT

## 2024-11-13 PROCEDURE — 97530 THERAPEUTIC ACTIVITIES: CPT | Mod: GP

## 2024-11-13 RX ORDER — PHENYLEPHRINE 10 MG/250 ML(40 MCG/ML)IN 0.9 % SOD.CHLORIDE INTRAVENOUS
0-2 CONTINUOUS
Status: DISCONTINUED | OUTPATIENT
Start: 2024-11-13 | End: 2024-11-13

## 2024-11-13 RX ORDER — METHOCARBAMOL 100 MG/ML
500 INJECTION, SOLUTION INTRAMUSCULAR; INTRAVENOUS EVERY 8 HOURS PRN
Status: DISCONTINUED | OUTPATIENT
Start: 2024-11-13 | End: 2024-11-13

## 2024-11-13 RX ORDER — LEVOTHYROXINE SODIUM ANHYDROUS 100 UG/5ML
25 INJECTION, POWDER, LYOPHILIZED, FOR SOLUTION INTRAVENOUS
Status: DISCONTINUED | OUTPATIENT
Start: 2024-11-13 | End: 2024-11-13

## 2024-11-13 RX ORDER — HYDROMORPHONE HYDROCHLORIDE 1 MG/ML
0.2 INJECTION, SOLUTION INTRAMUSCULAR; INTRAVENOUS; SUBCUTANEOUS EVERY 4 HOURS PRN
Status: DISCONTINUED | OUTPATIENT
Start: 2024-11-13 | End: 2024-11-22 | Stop reason: HOSPADM

## 2024-11-13 RX ORDER — CLOPIDOGREL BISULFATE 75 MG/1
75 TABLET ORAL DAILY
Status: DISCONTINUED | OUTPATIENT
Start: 2024-11-13 | End: 2024-11-22

## 2024-11-13 RX ORDER — NOREPINEPHRINE BITARTRATE 0.06 MG/ML
0-.5 INJECTION, SOLUTION INTRAVENOUS CONTINUOUS
Status: DISCONTINUED | OUTPATIENT
Start: 2024-11-13 | End: 2024-11-18

## 2024-11-13 RX ORDER — POTASSIUM CHLORIDE 14.9 MG/ML
20 INJECTION INTRAVENOUS
Status: COMPLETED | OUTPATIENT
Start: 2024-11-13 | End: 2024-11-13

## 2024-11-13 RX ORDER — ACETAMINOPHEN 325 MG/1
650 TABLET ORAL EVERY 6 HOURS PRN
Status: DISCONTINUED | OUTPATIENT
Start: 2024-11-13 | End: 2024-11-22 | Stop reason: HOSPADM

## 2024-11-13 RX ORDER — NOREPINEPHRINE BITARTRATE/D5W 8 MG/250ML
.01-1 PLASTIC BAG, INJECTION (ML) INTRAVENOUS CONTINUOUS
Status: DISCONTINUED | OUTPATIENT
Start: 2024-11-13 | End: 2024-11-13

## 2024-11-13 RX ORDER — HYDRALAZINE HYDROCHLORIDE 20 MG/ML
10 INJECTION INTRAMUSCULAR; INTRAVENOUS EVERY 4 HOURS PRN
Status: DISCONTINUED | OUTPATIENT
Start: 2024-11-13 | End: 2024-11-13

## 2024-11-13 RX ORDER — LABETALOL HYDROCHLORIDE 5 MG/ML
10 INJECTION, SOLUTION INTRAVENOUS EVERY 4 HOURS PRN
Status: DISCONTINUED | OUTPATIENT
Start: 2024-11-13 | End: 2024-11-13

## 2024-11-13 RX ORDER — ALBUMIN HUMAN 50 G/1000ML
12.5 SOLUTION INTRAVENOUS ONCE
Status: COMPLETED | OUTPATIENT
Start: 2024-11-13 | End: 2024-11-13

## 2024-11-13 RX ORDER — NOREPINEPHRINE BITARTRATE/D5W 8 MG/250ML
PLASTIC BAG, INJECTION (ML) INTRAVENOUS
Status: COMPLETED
Start: 2024-11-13 | End: 2024-11-13

## 2024-11-13 RX ORDER — ASPIRIN 300 MG/1
150 SUPPOSITORY RECTAL DAILY
Status: DISCONTINUED | OUTPATIENT
Start: 2024-11-13 | End: 2024-11-13

## 2024-11-13 RX ORDER — METHOCARBAMOL 100 MG/ML
500 INJECTION, SOLUTION INTRAMUSCULAR; INTRAVENOUS EVERY 8 HOURS
Status: DISCONTINUED | OUTPATIENT
Start: 2024-11-13 | End: 2024-11-13

## 2024-11-13 RX ORDER — NAPROXEN SODIUM 220 MG/1
81 TABLET, FILM COATED ORAL DAILY
Status: DISCONTINUED | OUTPATIENT
Start: 2024-11-13 | End: 2024-11-22 | Stop reason: HOSPADM

## 2024-11-13 RX ORDER — ACETAMINOPHEN 10 MG/ML
1000 INJECTION, SOLUTION INTRAVENOUS ONCE
Status: COMPLETED | OUTPATIENT
Start: 2024-11-13 | End: 2024-11-13

## 2024-11-13 RX ORDER — NOREPINEPHRINE BITARTRATE/D5W 8 MG/250ML
0-1 PLASTIC BAG, INJECTION (ML) INTRAVENOUS CONTINUOUS
Status: DISCONTINUED | OUTPATIENT
Start: 2024-11-13 | End: 2024-11-13

## 2024-11-13 RX ORDER — METHOCARBAMOL 500 MG/1
500 TABLET, FILM COATED ORAL EVERY 8 HOURS PRN
Status: DISCONTINUED | OUTPATIENT
Start: 2024-11-13 | End: 2024-11-13

## 2024-11-13 RX ORDER — HYDRALAZINE HYDROCHLORIDE 20 MG/ML
INJECTION INTRAMUSCULAR; INTRAVENOUS
Status: COMPLETED
Start: 2024-11-13 | End: 2024-11-13

## 2024-11-13 RX ADMIN — METHOCARBAMOL 500 MG: 1000 INJECTION, SOLUTION INTRAMUSCULAR; INTRAVENOUS at 10:39

## 2024-11-13 RX ADMIN — TIOTROPIUM BROMIDE INHALATION SPRAY 2 PUFF: 3.12 SPRAY, METERED RESPIRATORY (INHALATION) at 08:06

## 2024-11-13 RX ADMIN — POTASSIUM CHLORIDE 20 MEQ: 200 INJECTION, SOLUTION INTRAVENOUS at 05:00

## 2024-11-13 RX ADMIN — FLUTICASONE FUROATE AND VILANTEROL TRIFENATATE 1 PUFF: 100; 25 POWDER RESPIRATORY (INHALATION) at 08:06

## 2024-11-13 RX ADMIN — POTASSIUM CHLORIDE 20 MEQ: 200 INJECTION, SOLUTION INTRAVENOUS at 03:00

## 2024-11-13 RX ADMIN — SODIUM CHLORIDE 50 ML/HR: 9 INJECTION, SOLUTION INTRAVENOUS at 05:35

## 2024-11-13 RX ADMIN — LIDOCAINE 1 PATCH: 4 PATCH TOPICAL at 08:34

## 2024-11-13 RX ADMIN — LEVOTHYROXINE SODIUM ANHYDROUS 25 MCG: 100 INJECTION, POWDER, LYOPHILIZED, FOR SOLUTION INTRAVENOUS at 08:34

## 2024-11-13 RX ADMIN — ASPIRIN 81 MG CHEWABLE TABLET 81 MG: 81 TABLET CHEWABLE at 14:46

## 2024-11-13 RX ADMIN — IOHEXOL 83 ML: 350 INJECTION, SOLUTION INTRAVENOUS at 18:33

## 2024-11-13 RX ADMIN — POTASSIUM PHOSPHATE, MONOBASIC POTASSIUM PHOSPHATE, DIBASIC 15 MMOL: 224; 236 INJECTION, SOLUTION, CONCENTRATE INTRAVENOUS at 06:13

## 2024-11-13 RX ADMIN — ACETAMINOPHEN 600 MG: 10 INJECTION, SOLUTION INTRAVENOUS at 00:25

## 2024-11-13 RX ADMIN — CLOPIDOGREL BISULFATE 75 MG: 75 TABLET ORAL at 14:46

## 2024-11-13 RX ADMIN — PANTOPRAZOLE SODIUM 40 MG: 40 INJECTION, POWDER, FOR SOLUTION INTRAVENOUS at 06:14

## 2024-11-13 RX ADMIN — ACETAMINOPHEN 1000 MG: 1000 INJECTION INTRAVENOUS at 13:44

## 2024-11-13 RX ADMIN — ALBUMIN HUMAN 12.5 G: 0.05 INJECTION, SOLUTION INTRAVENOUS at 19:53

## 2024-11-13 RX ADMIN — NOREPINEPHRINE BITARTRATE 0.14 MCG/KG/MIN: 0.06 INJECTION, SOLUTION INTRAVENOUS at 20:00

## 2024-11-13 RX ADMIN — SODIUM CHLORIDE 1000 ML: 0.9 INJECTION, SOLUTION INTRAVENOUS at 22:00

## 2024-11-13 RX ADMIN — HEPARIN SODIUM 5000 UNITS: 5000 INJECTION INTRAVENOUS; SUBCUTANEOUS at 00:37

## 2024-11-13 RX ADMIN — METHOCARBAMOL 500 MG: 1000 INJECTION, SOLUTION INTRAMUSCULAR; INTRAVENOUS at 02:15

## 2024-11-13 RX ADMIN — PHENYLEPHRINE-NACL IV SOLUTION 10 MG/250ML-0.9% 1 MCG/KG/MIN: 10-0.9/25 SOLUTION at 16:24

## 2024-11-13 RX ADMIN — Medication 0.02 MCG/KG/MIN: at 11:35

## 2024-11-13 RX ADMIN — HEPARIN SODIUM 5000 UNITS: 5000 INJECTION INTRAVENOUS; SUBCUTANEOUS at 16:09

## 2024-11-13 RX ADMIN — HYDRALAZINE HYDROCHLORIDE 10 MG: 20 INJECTION INTRAMUSCULAR; INTRAVENOUS at 15:51

## 2024-11-13 RX ADMIN — CALCIUM GLUCONATE 1 G: 20 INJECTION, SOLUTION INTRAVENOUS at 03:40

## 2024-11-13 RX ADMIN — DEXTROSE MONOHYDRATE 1.5 MCG/KG/MIN: 50 INJECTION, SOLUTION INTRAVENOUS at 17:17

## 2024-11-13 RX ADMIN — HEPARIN SODIUM 5000 UNITS: 5000 INJECTION INTRAVENOUS; SUBCUTANEOUS at 08:34

## 2024-11-13 RX ADMIN — HYDROMORPHONE HYDROCHLORIDE 0.2 MG: 1 INJECTION, SOLUTION INTRAMUSCULAR; INTRAVENOUS; SUBCUTANEOUS at 22:34

## 2024-11-13 RX ADMIN — Medication 0.02 MCG/KG/MIN: at 17:09

## 2024-11-13 RX ADMIN — PHENYLEPHRINE HYDROCHLORIDE 1 MCG/KG/MIN: 10 INJECTION INTRAVENOUS at 11:36

## 2024-11-13 RX ADMIN — PHENYLEPHRINE-NACL IV SOLUTION 10 MG/250ML-0.9% 1.5 MCG/KG/MIN: 10-0.9/25 SOLUTION at 17:10

## 2024-11-13 RX ADMIN — NOREPINEPHRINE BITARTRATE 0.02 MCG/KG/MIN: 8 INJECTION, SOLUTION INTRAVENOUS at 17:09

## 2024-11-13 RX ADMIN — BUSPIRONE HYDROCHLORIDE 10 MG: 10 TABLET ORAL at 14:46

## 2024-11-13 RX ADMIN — SODIUM CHLORIDE 1000 ML: 9 INJECTION, SOLUTION INTRAVENOUS at 19:54

## 2024-11-13 RX ADMIN — VASOPRESSIN 0.03 UNITS/MIN: 0.2 INJECTION INTRAVENOUS at 23:15

## 2024-11-13 ASSESSMENT — COGNITIVE AND FUNCTIONAL STATUS - GENERAL
DRESSING REGULAR LOWER BODY CLOTHING: A LOT
DRESSING REGULAR UPPER BODY CLOTHING: A LOT
MOVING FROM LYING ON BACK TO SITTING ON SIDE OF FLAT BED WITH BEDRAILS: A LOT
TOILETING: A LOT
CLIMB 3 TO 5 STEPS WITH RAILING: TOTAL
PERSONAL GROOMING: A LITTLE
HELP NEEDED FOR BATHING: A LOT
TURNING FROM BACK TO SIDE WHILE IN FLAT BAD: A LOT
EATING MEALS: A LITTLE
STANDING UP FROM CHAIR USING ARMS: TOTAL
MOBILITY SCORE: 8
MOVING TO AND FROM BED TO CHAIR: TOTAL
WALKING IN HOSPITAL ROOM: TOTAL
DAILY ACTIVITIY SCORE: 14

## 2024-11-13 ASSESSMENT — PAIN - FUNCTIONAL ASSESSMENT
PAIN_FUNCTIONAL_ASSESSMENT: 0-10
PAIN_FUNCTIONAL_ASSESSMENT: CPOT (CRITICAL CARE PAIN OBSERVATION TOOL)
PAIN_FUNCTIONAL_ASSESSMENT: 0-10
PAIN_FUNCTIONAL_ASSESSMENT: CPOT (CRITICAL CARE PAIN OBSERVATION TOOL)

## 2024-11-13 ASSESSMENT — PAIN SCALES - GENERAL
PAINLEVEL_OUTOF10: 7
PAINLEVEL_OUTOF10: 0 - NO PAIN

## 2024-11-13 ASSESSMENT — ACTIVITIES OF DAILY LIVING (ADL)
HOME_MANAGEMENT_TIME_ENTRY: 21
BATHING_ASSISTANCE: MAXIMAL
ADL_ASSISTANCE: INDEPENDENT
ADL_ASSISTANCE: INDEPENDENT

## 2024-11-13 ASSESSMENT — PAIN DESCRIPTION - DESCRIPTORS: DESCRIPTORS: SORE;TENDER

## 2024-11-13 NOTE — CONSULTS
Date of Service:  11/13/2024 Attending Provider:  Saurabh Buckner MD     Reason for Consultation:  Zulma Rao is being seen today for a consult requested by Saurabh Buckner MD for L ICA stenosis.    Subjective   History of Present Illness:  Zulma is a 79 y.o. female with h/o HFpEF, NSTEMI, T2DM, HTN, HLD, anxiety, hypothyroidism, COPD, previous R sided stroke (7/2024), b/l cataracts, bilateral carotid stenosis s/p R CEA (2011), HCC s/p partial hepatectomy (2019), p/w CVA 2/2 L M1 occlusion, s/p TNK, s/p MT w TICI 3. At this time, patient is unable to provide a history. At this time, per the ICU team, she has aphasia with waning exam, correlating with lower blood pressure levels.     She is currently on aspirin and plavix. Cerebrovascular accident (CVA) due to occlusion of left middle cerebral artery (Multi)      Review of Systems negative other than listed in HPI.    Objective   Vitals:  Vitals:    11/13/24 1400   BP:    Pulse: 94   Resp: 23   Temp:    SpO2: 100%         Exam:  Constitutional: No acute distress  Resp: breathing comfortably on room air  Neuro: Awake, Ox1  face symmetric  RUE wd  LUE 4/5  RLE wd  LLE 4/5  Psych: appropriate  Skin: no obvious lesions    Medical History  Past Medical History:   Diagnosis Date    Personal history of other diseases of the circulatory system     History of hypertension    Personal history of other diseases of the circulatory system     History of carotid artery stenosis    Personal history of other diseases of the nervous system and sense organs     History of cataract    Personal history of other endocrine, nutritional and metabolic disease     History of hyperlipidemia       Surgical History  Past Surgical History:   Procedure Laterality Date    OTHER SURGICAL HISTORY  12/03/2018    Eye surgery    OTHER SURGICAL HISTORY  12/03/2018    Foot surgery    OTHER SURGICAL HISTORY  12/03/2018    Cataract surgery    OTHER SURGICAL HISTORY  12/03/2018    Carotid  "thromboendarterectomy    OTHER SURGICAL HISTORY  12/03/2018    Tonsillectomy        Medications  Current Outpatient Medications   Medication Instructions    acetaminophen (TYLENOL 8 HOUR) 650 mg, oral, Every 12 hours PRN, Do not crush, chew, or split.    aspirin 81 mg, oral, Daily    atorvastatin (LIPITOR) 80 mg, oral, Daily    azithromycin (ZITHROMAX) 250 mg, oral, Three times a week ( Monday , Wednesday and Friday )     busPIRone (BUSPAR) 10 mg, oral, 3 times daily    cloNIDine (CATAPRES) 0.1 mg, oral, 2 times daily PRN    clopidogrel (PLAVIX) 75 mg, oral, Daily    ergocalciferol (VITAMIN D-2) 1.25 mg, oral, Weekly    fluticasone-umeclidin-vilanter (Trelegy Ellipta) 100-62.5-25 mcg blister with device 1 puff, inhalation, Daily,   At the same time each day    furosemide (LASIX) 20 mg, oral, Daily    ipratropium-albuteroL (Duo-Neb) 0.5-2.5 mg/3 mL nebulizer solution 3 mL, nebulization, 4 times daily PRN    levothyroxine (SYNTHROID, LEVOXYL) 25 mcg, oral, Daily, Take on an empty stomach at the same time each day, either 30 to 60 minutes prior to breakfast    melatonin 3 mg, oral, Nightly    pantoprazole (PROTONIX) 40 mg, oral, Daily before breakfast, Do not crush, chew, or split.        Diagnostic Results:    Lab Results   Component Value Date    WBC 14.2 (H) 11/13/2024    HGB 11.4 (L) 11/13/2024    HCT 32.7 (L) 11/13/2024    MCV 87 11/13/2024     11/13/2024     Lab Results   Component Value Date    CREATININE 0.38 (L) 11/13/2024    BUN 4 (L) 11/13/2024     11/13/2024    K 3.3 (L) 11/13/2024     11/13/2024    CO2 28 11/13/2024     No results found for: \"INR\", \"PROTIME\"    === 11/11/24 ===    CT ANGIO AORTA AND BILATERAL ILIOFEMORAL RUN OFF INCLUDING WITHOUT CONTRAST IF PERFORMED    - Impression -  1. Extensive vascular disease including occlusion of the infrarenal  abdominal aorta, bilateral common iliac arteries. Additionally, no  appreciated flow is noted in the left posterior tibial " artery  distally. Please refer to the detailed description above.  2. Other nonspecific finding as detailed above.            Signed by: Erika Byers 11/13/2024 8:42 AM  Dictation workstation:   [id number]  === 11/11/24 ===    MR BRAIN WO CONTRAST    - Impression -  1. Acute/subacute watershed ischemia throughout the left cerebral  hemisphere  2. Subcentimeter cortical infarctions involving the left superior  frontal lobe but also involving the left parietal and temporal lobes.  No evidence of hemorrhagic transformation.  3. Nonspecific white matter changes which can be seen in the setting  of chronic microangiopathy.    I personally reviewed the images/study and I agree with Lo Santo DO's (radiology resident) findings as stated. This study  was interpreted at [facility],  [place], [place].    MACRO:  Critical Finding:  See findings. Notification was initiated on  11/13/2024 at 1:38 am by  Lo Santo.  (**-OC[facility]-**)    Signed by: Yimi George 11/13/2024 7:24 AM  Dictation workstation:   [id number]      Assessment/Plan   Assessment:  Zulma Rao is an 79 year old with h/o HFpEF, NSTEMI, T2DM, HTN, HLD, anxiety, hypothyroidism, COPD, previous R sided stroke (7/2024), b/l cataracts, bilateral carotid stenosis s/p R CEA (2011), HCC s/p partial hepatectomy (2019), p/w CVA 2/2 L M1 occlusion, s/p TNK, s/p MT w TICI 3, MRI L watershed infarcts, small infarcts involving left frontal, parietal, and temporal lobes, CTA 74% L carotid artery occlusion proximal to bifurcation.    Patient is presenting following a L MCA stroke, was found to have significant stenosis of the left carotid artery, correlating with the watershed strokes seen on MRI and the pressure dependent exam. Due to the difficult access, stent placement may be difficult, and CEA may be more optimal. Will discuss possible revascularization with team.    Plan:  Will evaluate for possible  revascularization  Please consult cardiology for OR clearance and optimization  Please obtain CBC, RFP, coag, T&S, UA, EKG, CXR        Farhan Melendez MD

## 2024-11-13 NOTE — CARE PLAN
Interprofessional Rounds    Summary:  R MCA stroke s/p TNK and attempted thrombectomy.  Has a pressure dependent exam related to bilateral carotid stenosis.  On vasopressors.  Daughter involved.    Participants: Advance Practice Provider, Ethicist, Occupational Therapist, Pharmacist, Physical Therapist, Physician, RN, and     Care Plan Reviewed with:  Interdisciplinary Team

## 2024-11-13 NOTE — PROGRESS NOTES
Physical Therapy    Physical Therapy Evaluation and Treatment    Patient Name: Zulma Rao  MRN: [id number]  Department: [facility]  Room: 09/09-A  Today's Date: 11/13/2024   Time Calculation  Start Time: 1210  Stop Time: 1236  Time Calculation (min): 26 min    Assessment/Plan   PT Assessment  PT Assessment Results: Decreased strength, Decreased endurance, Impaired balance, Decreased mobility, Decreased cognition, Decreased safety awareness  Rehab Prognosis: Good  Barriers to Discharge: medical instability  Evaluation/Treatment Tolerance: Patient limited by fatigue  Medical Staff Made Aware: Yes  Strengths: Premorbid level of function  Barriers to Participation: Coping skills, Ability to acquire knowledge, Insight into problems  End of Session Communication: Bedside nurse  Assessment Comment: Patient able to follow 1 step commands with delayed processing with >50% accuracy/appropriateness. Patient denied lightheadedness, nausea, headache, chest pain, and shortness of breath. Patient did have an increase in systolic BP with exertion and positional movements that recovered when at rest in bed. Patient demonstrated nearly equal LE strength with both legs being weaker than expected for an independent in-home ambulator. Patient required significant assistance for all mobility and [place] to maintain sitting upright. PT progression limited by hypertension as she increased to 191 mmHg max (176-191 range, avg 182) when at the edge of the bed. Patient returned to bed with reduction. Nursing updated. PT to follow up as able and appropriate.  End of Session Patient Position: Bed, 3 rail up, Alarm on  IP OR SWING BED PT PLAN  Inpatient or Swing Bed: Inpatient  PT Plan  Treatment/Interventions: Bed mobility, Transfer training, Gait training, Stair training, Balance training, Neuromuscular re-education, Neurodevelopmental intervention, Strengthening, Endurance training, Range of motion, Therapeutic exercise, Therapeutic activity,  Home exercise program  PT Plan: Ongoing PT  PT Frequency: 4 times per week  PT Discharge Recommendations: Moderate intensity level of continued care  PT Recommended Transfer Status: Assist x2  PT - OK to Discharge: Yes    Subjective   General Visit Information:  General  Reason for Referral: Admitted 11/11/2024 with Cerebrovascular accident (CVA) due to occlusion of left middle cerebral artery (Multi) after transferred for mechanical thrombectomy. 11/11 TNK and spontaneous recanalization on angiogram TICI 3  Past Medical History Relevant to Rehab: prior R sided stroke with left sided deficit (per personal review of imaging, L sided posterior limb of internal capsule) 7/2024, bilateral carotid artery stenosis S/P R CEA 2011, HFpEF, NSTEMI, T2DM, HT, DLD, hypothyroidism, COPD, HCC S/P partial hepatectomy (2019), anxiety, b/l cataracts  Missed Visit: No  Prior to Session Communication: Bedside nurse  Patient Position Received: Bed, 3 rail up, Alarm off, not on at start of session  General Comment: Patient A&Ox3 (situation) with delayed processing, but able to follow single step commands >50% of the time. Patient open to working with PT  Home Living:  Home Living  Type of Home: House  Lives With: Spouse  Home Adaptive Equipment: None  Home Layout: One level  Home Access: Level entry  Home Living Comments: Patient somewhat inappropriate with answers. It is recommended to confirm living set up with family members.  Prior Level of Function:  Prior Function Per Pt/Caregiver Report  Level of [place]: Independent with homemaking with ambulation, Independent with ADLs and functional transfers  ADL Assistance: Independent  Homemaking Assistance: Independent  Ambulatory Assistance: Independent  Prior Function Comments: Patient somewhat inappropriate with answers. It is recommended to confirm prior functional ability with family members. Patient reports no falls and no difficulties at home. Patient reports not needing a  "walker nor cane for mobility.  Precautions:  Precautions  Hearing/Visual Limitations: b/l cataracts, left gaze deviation  Medical Precautions: Fall precautions  Precautions Comment: -180mmHg     Vital Signs (Past 2hrs)        Date/Time Vitals Session Patient Position Pulse Resp SpO2 BP MAP (mmHg)    11/13/24 1210  Pre PT --  80 19  100 %  176/60 --     11/13/24 1230 --  --  81  19  100 %  --  --     11/13/24 1236 Post PT  --  78  22  100 %  180/59  --     11/13/24 1245 --  --  77  15  99 %  --  --     11/13/24 1300 --  --  76  15  99 %  --  --     11/13/24 1315 --  --  85  12  97 %  --  --     11/13/24 1330 --  --  94  17  93 %  --  --     11/13/24 1345 --  --  95  17  98 %  --  --     11/13/24 1400 --  --  94  23  100 %  --  --                    Treatments:    Please see \"Functional Assessment\" for specifics regarding treatment during this evaluation. Patient given education on how to safely mobilize with regard to equipment and precautions. Extra repetitions, patient education. vital sign monitoring. and cuing given to maximize independence.       Objective   Pain:  Pain Assessment  Pain Assessment: 0-10  0-10 (Numeric) Pain Score:  (Patient reported generalized pain, but PT had difficulty quantifying and getting accurate location of pain)  Cognition:  Cognition  Orientation Level: Disoriented to situation    General Assessments:  Activity Tolerance  Endurance: Tolerates 10 - 20 min exercise with multiple rests    Strength  Strength Comments: generalized functional weakness with right LE and LE relatively equal, but increased tone in right leg.  Postural Control  Postural Control: Impaired    Static Sitting Balance  Static Sitting-Balance Support: Bilateral upper extremity supported, Feet supported  Static Sitting-Level of Assistance: Moderate assistance, Minimum assistance  Static Sitting-Comment/Number of Minutes: Fluctuated between min and moderate assistance  Dynamic Sitting Balance  Dynamic " Sitting-Balance Support: Bilateral upper extremity supported, Feet supported  Dynamic Sitting-Level of Assistance: Moderate assistance       Functional Assessments:  Bed Mobility  Bed Mobility: Yes  Bed Mobility 1  Bed Mobility 1: Supine to sitting  Level of Assistance 1: Maximum assistance  Bed Mobility Comments 1: Patient able to assist with legs, but needed full torso support x1  Bed Mobility 2  Bed Mobility  2: Sitting to supine  Level of Assistance 2: Dependent  Bed Mobility Comments 2: Used TAPS. Full assist  Bed Mobility 3  Bed Mobility 3: Rolling left, Rolling right  Level of Assistance 3: Maximum assistance  Bed Mobility Comments 3: Patient able to help with UE and partially initiate roll       Extremity/Trunk Assessments:  RLE   RLE :  (No visible signs of contracture, nor clear ROM limitations)  LLE   LLE :  (No visible signs of contracture, nor clear ROM limitations)  Outcome Measures:  [facility] Basic Mobility  Turning from your back to your side while in a flat bed without using bedrails: A lot  Moving from lying on your back to sitting on the side of a flat bed without using bedrails: A lot  Moving to and from bed to chair (including a wheelchair): Total  Standing up from a chair using your arms (e.g. wheelchair or bedside chair): Total  To walk in hospital room: Total  Climbing 3-5 steps with railing: Total  Basic Mobility - Total Score: 8    FSS-ICU  Ambulation: Unable to attempt due to weakness  Rolling: Maximal assistance (performs 25% - 49% of task)  Sitting: Moderate assistance (performs 50 - 74% of task)  Transfer Sit-to-Stand: Unable to perform  Transfer Supine-to-Sit: Maximal assistance (performs 25% - 49% of task)  Total Score: 7         ICU Mobility Scale: Sitting over edge of bed [3]    Encounter Problems       Encounter Problems (Active)       PT Problem       Patient is able to perform sit to stand requiring contact guard assist or less  (Progressing)       Start:  11/13/24    Expected End:   11/27/24            Patient is able to ambulate 150 feet without loss of balance requiring contact guard or less assist  (Progressing)       Start:  11/13/24    Expected End:  11/27/24            Patient is able to perform supine to sit transfer requiring contact guard assist or less (Progressing)       Start:  11/13/24    Expected End:  11/27/24               Pain - Adult              Education Documentation  Body Mechanics, taught by Gil Ernst PT at 11/13/2024  2:09 PM.  Learner: Patient  Readiness: Acceptance  Method: Demonstration  Response: Demonstrated Understanding, Needs Reinforcement  Comment: PT goals, blood pressure precautions    Home Exercise Program, taught by Gil Ernst PT at 11/13/2024  2:09 PM.  Learner: Patient  Readiness: Acceptance  Method: Demonstration  Response: Demonstrated Understanding, Needs Reinforcement  Comment: PT goals, blood pressure precautions    Mobility Training, taught by Gil Ernst PT at 11/13/2024  2:09 PM.  Learner: Patient  Readiness: Acceptance  Method: Demonstration  Response: Demonstrated Understanding, Needs Reinforcement  Comment: PT goals, blood pressure precautions    Education Comments  No comments found.

## 2024-11-13 NOTE — PROGRESS NOTES
"Zulma Rao is a 79 y.o. female on day 2 of admission presenting with Cerebrovascular accident (CVA) due to occlusion of left middle cerebral artery (Multi).    Subjective   No acute events overnight. Still maintained on vasopressors (norepi, phenylephrine). She is now s/p R basilic PICC line placement, aspirin initiation, subcutaneous heparin initiation. Electrolytes repleted overnight.     Able to rest comfortably through the night s/p MRI H w/o contrast. Endorses pain of her R wrist.        Objective   Visit Vitals  BP (!) 200/72 Comment: MRI   Pulse 74   Temp 36.5 °C (97.7 °F) (Temporal)   Resp 17   Ht 1.626 m (5' 4\")   Wt 50.4 kg (111 lb 1.8 oz)   SpO2 100%   BMI 19.07 kg/m²   OB Status Menopausal   BSA 1.51 m²   ** 11/13/24 0700 /64 per Arterial Line.  Overnight BP range 174-207 / 57-77  - Trough 174/57 at 0645 since 0000  - Last documented NIHSS 11/12 1900 score of 11       Physical Exam  Vitals reviewed.   HENT:      Head: Normocephalic and atraumatic.      Mouth/Throat:      Mouth: Mucous membranes are moist.   Eyes:      Extraocular Movements: Extraocular movements intact.   Cardiovascular:      Rate and Rhythm: Normal rate and regular rhythm.      Heart sounds: No murmur heard.  Pulmonary:      Effort: Pulmonary effort is normal.      Breath sounds: Normal breath sounds. No wheezing.   Neurological:      Mental Status: She is alert.     Neurological Exam  Mental Status  Alert. Oriented to person, place, time and situation. Expressive aphasia present. Able to name objects.    Cranial Nerves  CN II: Visual fields intact bilaterally in all quadrants to threat - closes eyes to threat on exam.  CN III, IV, VI: Extraocular movements intact bilaterally.   Right pupil: Round. Reactive to light. Reactive to accommodation.   Left pupil: Round. Reactive to light. Reactive to accommodation.  CN V: Facial sensation is normal.  CN VII: Full and symmetric facial movement.  CN VIII: Hearing grossly intact to " conversation.  CN IX, X: Palate elevates symmetrically  CN XI:  Right: Sternocleidomastoid strength is normal. Trapezius strength is weak.  Left: Sternocleidomastoid strength is normal. Trapezius strength is normal.  CN XII: Tongue midline without atrophy or fasciculations.    Motor   No fasciculations present.  Moves all extremities, strength tested to shoulder/hip extension during NIHSS exam. Endorses that some of RUE/LUE movement is limited by pain.   LUE: 3/5   LLE: 4/5  RUE, RLE: 3-/5..    Sensory  Sensory  Extremity sensation grossly intact and symmetric to light touch bilaterally. Patient endorses difference in sensation of light touch R vs L dorsal hand but was unable to articulate if this was a difference of quality or degree of sensation.      .    Coordination  Left: Finger-to-nose normal.  R finger to nose limited by pain, unable to assess.    11/13/24 @ 7:30AM; /60  NIHSS: 6  - Level of consciousness: 0 = Alert  - LOC Question: 0 = Both correct  - LOC Commands: 0 = Obeys both  - Best Gaze: 0 = Normal  - Visual Field: 0 = No visual loss  - Facial Paresis: 0 = Normal symmetrical movement  - LUE: 1 = Limb drifts downward before 10 seconds  - RUE: 2 = Some effort vs gravity  - LLE: 0 = No drift; 5 sec  - RLE: 1 = Drift before 5 sec  - Limb Ataxia: 0 = Absent  - Sensory: 0 = Absent  - Best Language: 1 = Mild/moderate aphasia  - Dysarthria: 1 = Mild/moderate dysarthria  - Neglect: 0 = None    Relevant Results    MEDICATIONS:  Scheduled medications  aspirin, 300 mg, rectal, Daily  atorvastatin, 80 mg, oral, Nightly  azithromycin, 250 mg, oral, Once per day on Monday Wednesday Friday  busPIRone, 10 mg, oral, TID  pantoprazole, 40 mg, oral, Daily before breakfast   Or  esomeprazole, 40 mg, nasoduodenal tube, Daily before breakfast   Or  pantoprazole, 40 mg, intravenous, Daily before breakfast  tiotropium, 2 puff, inhalation, Daily   And  fluticasone furoate-vilanteroL, 1 puff, inhalation, Daily  heparin  (porcine), 5,000 Units, subcutaneous, q8h  insulin lispro, 0-5 Units, subcutaneous, TID AC  levothyroxine, 25 mcg, intravenous, q24h VIVIAN  [Held by provider] levothyroxine, 25 mcg, oral, Daily  lidocaine, 5 mL, infiltration, Once  lidocaine, 1 patch, transdermal, Daily  magnesium sulfate, 4 g, intravenous, Once  melatonin, 3 mg, oral, Nightly  methocarbamol, 500 mg, intravenous, q8h  polyethylene glycol, 17 g, oral, Daily  potassium phosphate, 15 mmol, intravenous, Once  sennosides, 2 tablet, oral, BID      Continuous medications  norepinephrine, 0.01-1 mcg/kg/min, Last Rate: 0.03 mcg/kg/min (11/13/24 0700)  phenylephrine, 0-9 mcg/kg/min, Last Rate: 1 mcg/kg/min (11/13/24 0542)  sodium chloride 0.9%, 50 mL/hr, Last Rate: 50 mL/hr (11/13/24 0535)      PRN medications  PRN medications: acetaminophen **OR** acetaminophen, alteplase, calcium gluconate, calcium gluconate, dextrose, dextrose, glucagon, glucagon, hydrALAZINE **FOLLOWED BY** hydrALAZINE, ipratropium-albuteroL, labetaloL, magnesium sulfate, magnesium sulfate, ondansetron ODT **OR** ondansetron, oxygen, potassium chloride CR **OR** potassium chloride, potassium chloride CR **OR** potassium chloride, potassium chloride     LABS:  Results for orders placed or performed during the hospital encounter of 11/11/24 (from the past 24 hours)   POCT GLUCOSE   Result Value Ref Range    POCT Glucose 89 74 - 99 mg/dL   Renal function panel   Result Value Ref Range    Glucose 104 (H) 74 - 99 mg/dL    Sodium 141 136 - 145 mmol/L    Potassium 3.1 (L) 3.5 - 5.3 mmol/L    Chloride 106 98 - 107 mmol/L    Bicarbonate 27 21 - 32 mmol/L    Anion Gap 11 10 - 20 mmol/L    Urea Nitrogen 3 (L) 6 - 23 mg/dL    Creatinine 0.39 (L) 0.50 - 1.05 mg/dL    eGFR >90 >60 mL/min/1.73m*2    Calcium 7.3 (L) 8.6 - 10.6 mg/dL    Phosphorus 2.3 (L) 2.5 - 4.9 mg/dL    Albumin 3.2 (L) 3.4 - 5.0 g/dL   Transthoracic Echo (TTE) Complete   Result Value Ref Range    AV pk jose miguel 1.45 m/s    LVOT diam 1.80 cm     MV E/A ratio 2.00     LA vol index A/L 48.0 ml/m2    Tricuspid annular plane systolic excursion 2.0 cm    LV EF 68 %    RV free wall pk S' 12.80 cm/s    LVIDd 3.50 cm    Aortic Valve Area by Continuity of Peak Velocity 1.46 cm2    AV pk grad 8 mmHg    LV A4C EF 78.7    POCT GLUCOSE   Result Value Ref Range    POCT Glucose 137 (H) 74 - 99 mg/dL   POCT GLUCOSE   Result Value Ref Range    POCT Glucose 118 (H) 74 - 99 mg/dL   Magnesium   Result Value Ref Range    Magnesium 2.42 (H) 1.60 - 2.40 mg/dL   Renal Function Panel   Result Value Ref Range    Glucose 125 (H) 74 - 99 mg/dL    Sodium 142 136 - 145 mmol/L    Potassium 3.3 (L) 3.5 - 5.3 mmol/L    Chloride 105 98 - 107 mmol/L    Bicarbonate 28 21 - 32 mmol/L    Anion Gap 12 10 - 20 mmol/L    Urea Nitrogen 4 (L) 6 - 23 mg/dL    Creatinine 0.38 (L) 0.50 - 1.05 mg/dL    eGFR >90 >60 mL/min/1.73m*2    Calcium 7.6 (L) 8.6 - 10.6 mg/dL    Phosphorus 2.0 (L) 2.5 - 4.9 mg/dL    Albumin 3.2 (L) 3.4 - 5.0 g/dL   CBC and Auto Differential   Result Value Ref Range    WBC 14.2 (H) 4.4 - 11.3 x10*3/uL    nRBC 0.0 0.0 - 0.0 /100 WBCs    RBC 3.78 (L) 4.00 - 5.20 x10*6/uL    Hemoglobin 11.4 (L) 12.0 - 16.0 g/dL    Hematocrit 32.7 (L) 36.0 - 46.0 %    MCV 87 80 - 100 fL    MCH 30.2 26.0 - 34.0 pg    MCHC 34.9 32.0 - 36.0 g/dL    RDW 13.7 11.5 - 14.5 %    Platelets 186 150 - 450 x10*3/uL    Neutrophils % 70.4 40.0 - 80.0 %    Immature Granulocytes %, Automated 0.4 0.0 - 0.9 %    Lymphocytes % 15.6 13.0 - 44.0 %    Monocytes % 11.9 2.0 - 10.0 %    Eosinophils % 1.1 0.0 - 6.0 %    Basophils % 0.6 0.0 - 2.0 %    Neutrophils Absolute 10.02 (H) 1.60 - 5.50 x10*3/uL    Immature Granulocytes Absolute, Automated 0.06 0.00 - 0.50 x10*3/uL    Lymphocytes Absolute 2.21 0.80 - 3.00 x10*3/uL    Monocytes Absolute 1.69 (H) 0.05 - 0.80 x10*3/uL    Eosinophils Absolute 0.15 0.00 - 0.40 x10*3/uL    Basophils Absolute 0.08 0.00 - 0.10 x10*3/uL   Calcium, ionized   Result Value Ref Range    POCT  Calcium, Ionized 1.01 (L) 1.1 - 1.33 mmol/L     IMAGING:  MR brain wo IV contrast  Result Date: 11/13/2024 1:26 am     FINDINGS:   CSF Spaces: The ventricles, sulci and basal cisterns are within normal limits for patient's age.     Parenchyma: Watershed distribution areas of T2/FLAIR hyperintense signal with associated diffusion restriction throughout the left cerebral hemisphere most pronounced in the superior left frontal lobe but also involving the parietal and medial temporal lobes. Finding is consistent with acute/subacute watershed infarction.   Cortical foci of diffusion restriction in the left frontal lobe cortex along the interhemispheric aspect, in the left sylvian fissure and posterior left insula and in the left posterior temporal lobe. These findings are consistent with non watershed distribution   2.5 cm focus of encephalomalacia within the right occipital lobe consistent with previous infarction in the distribution of the right posterior cerebral artery. Focus of subacute/chronic lacunar infarction in the right thalamus measuring a proximally 5 mm x 15 mm in size. Focus of susceptibility artifact within the left basal ganglia favored to correspond to mineralization seen on prior CT. Nonspecific patchy T2/FLAIR hyperintense signal within the periventricular and subcortical white matter, likely sequela of chronic microangiopathy.   There is no mass effect or midline shift.       Paranasal Sinuses and Mastoids: Visualized paranasal sinuses and mastoid air cells are unremarkable.   Note is made of postsurgical changes from bilateral lens replacements.       Impression  1. Acute/subacute watershed ischemia throughout the left cerebral hemisphere   2. Subcentimeter cortical infarctions involving the left superior frontal lobe but also involving the left parietal and temporal lobes. No evidence of hemorrhagic transformation.   3. Nonspecific white matter changes which can be seen in the setting of chronic  microangiopathy.       Transthoracic Echo (TTE) Complete  11/12/2024    PHYSICIAN INTERPRETATION:   Left Ventricle: Left ventricular ejection fraction is normal, by visual estimate at 65-70%. There are no regional left ventricular wall motion abnormalities. The left ventricular cavity size is decreased. There is mildly increased septal and mildly increased posterior left ventricular wall thickness. There is left ventricular concentric remodeling. Spectral Doppler shows an abnormal pattern of left ventricular diastolic filling. There is no definite left ventricular thrombus visualized.   Left Atrium: The left atrium is severely dilated. A bubble study using agitated saline was performed. Bubble study is negative. Agitated saline contrast study was negative for intracardiac shunt. Right Ventricle: The right ventricle is normal in size. There is normal right ventricular global systolic function.   Right Atrium: The right atrium is normal in size.   Aortic Valve: The aortic valve is trileaflet. There is no evidence of aortic valve regurgitation. The peak instantaneous gradient of the aortic valve is 8 mmHg.   Mitral Valve: The mitral valve is normal in structure. The peak instantaneous gradient of the mitral valve is 12 mmHg. There is trace to mild mitral valve regurgitation.   Tricuspid Valve: The tricuspid valve is structurally normal. There is trace tricuspid regurgitation. The right ventricular systolic pressure is unable to be estimated.   Pulmonic Valve: The pulmonic valve is structurally normal. There is physiologic pulmonic valve regurgitation.   Pericardium: Trivial pericardial effusion.   Aorta: The aortic root is normal. The aortic root appears normal in size and measures 2.90 cm.  Systemic Veins: The inferior vena cava appears normal in size, with IVC inspiratory collapse greater than 50%. In comparison to the previous echocardiogram(s): There are no prior studies on this patient for comparison purposes. No  prior echocardiogram available for comparison.    CONCLUSIONS:    1. Left ventricular ejection fraction is normal, by visual estimate at 65-70%.    2. Spectral Doppler shows an abnormal pattern of left ventricular diastolic filling.    3. Left ventricular cavity size is decreased.    4. No left ventricular thrombus visualized.    5. There is normal right ventricular global systolic function.    6. The left atrium is severely dilated.    7. Agitated saline contrast study was negative for intracardiac shunt.    8. Normal aortic root.    9. No prior echocardiogram available for comparison.     CT ANGIO AORTA AND BILATERAL ILIOFEMORAL RUN OFF INCLUDING WITHOUT  CONTRAST 11/12/2024  IMPRESSION:  1. Extensive vascular disease including occlusion of the infrarenal  abdominal aorta, bilateral common iliac arteries. Additionally, no  appreciated flow is noted in the left posterior tibial artery  distally. Please refer to the detailed description above.  2. Other nonspecific finding as detailed above.    CT head wo IV contrast; CT angio head and neck w and wo IV contrast  11/12/2024 5:34 am     FINDINGS: NON-CONTRAST HEAD CT:     BRAIN PARENCHYMA:  No evidence of acute intraparenchymal hemorrhage or parenchymal evidence of an acute large territory ischemic infarct. No mass-effect, midline shift or effacement of cerebral sulci. Gray-white matter distinction is preserved. Punctate calcifications within the left-greater-than-right basal ganglia. Lacunar within the posterior limb of the right internal capsule and left basal ganglia. Small amount of encephalomalacia and gliosis within the right occipital lobe.     VENTRICLES and EXTRA-AXIAL SPACES:  No acute extra-axial or intraventricular hemorrhage. Ventricles and sulci are age-concordant.     PARANASAL SINUSES/MASTOIDS:  No hemorrhage or air-fluid levels within the visualized paranasal sinuses. The mastoids are well aerated.     CALVARIUM/ORBITS:  No skull fracture.  The orbits  and globes are intact to the extent visualized.     EXTRACRANIAL SOFT TISSUES: No discernible abnormality.         CTA NECK:   There is calcified and noncalcified plaque along the aortic arch and origins of the great vessels. There is marked narrowing at the origin of the right common carotid artery and right subclavian artery. There is moderate narrowing secondary to noncalcified plaque at the origin of the left subclavian artery.     LEFT VERTEBRAL ARTERY:  No hemodynamically significant stenosis, occlusion, or dissection. The left vertebral artery is dominant.     LEFT COMMON/INTERNAL CAROTID ARTERY:  There is calcified plaque along the course of the common carotid artery with mild-to-moderate narrowing. There is coarse calcification at the carotid bifurcation and along the proximal cervical internal carotid artery with a proximally 65% stenosis by NASCET criteria. The cervical internal carotid artery is patent to the level of the skull base without additional stenosis.     RIGHT VERTEBRAL ARTERY: There is coarse atherosclerotic calcification at the origin of the right vertebral artery. No hemodynamically significant stenosis, occlusion, or dissection.     RIGHT COMMON/INTERNAL CAROTID ARTERY:  There is occlusion of the right common carotid artery just beyond the origin. There is occlusion of the right cervical internal carotid artery.   Limited images through the lung apices demonstrate emphysematous changes. Mild degenerative changes of the cervical spine without evidence of high-grade spinal canal stenosis. Soft tissues of the neck are unremarkable.            CTA HEAD:     ANTERIOR CIRCULATION: No aneurysm.    - Internal Carotid Arteries: There is occlusion of the right petrous and cavernous internal carotid artery. There is reconstitution within the supraclinoid segment. The left intracranial carotid artery is patent. There is atherosclerotic calcification along the cavernous and supraclinoid ICA with mild  narrowing.     - Middle Cerebral Arteries: No hemodynamically significant stenosis or occlusion.     - Anterior Cerebral Arteries: There is and abrupt cutoff of the distal left anterior cerebral artery (series 504, image 82). The right [place] is without hemodynamically significant stenosis or occlusion.       POSTERIOR CIRCULATION: No aneurysm.     - Intracranial Vertebral Arteries:  No hemodynamically significant stenosis or occlusion.     - Basilar Artery:  No hemodynamically significant stenosis or occlusion.     - Posterior Cerebral Arteries:  No hemodynamically significant stenosis or occlusion.        CTA NECK:     Complete occlusion of the right common carotid artery and right cervical internal carotid artery.   Coarse atherosclerotic calcification at the left carotid bifurcation with approximately 65% stenosis by NASCET criteria.   The vertebral arteries are patent. The left vertebral artery is dominant. There is a coarse calcification at the origin of the right vertebral artery.   Calcified and noncalcified plaque along the aortic arch and origins of the great vessels with narrowing.     CTA head:   There is an abrupt cutoff of the distal left anterior cerebral artery.   Occlusion of the right petrous and cavernous carotid arteries with reconstitution in the supraclinoid segment.   The left intracranial carotid artery is patent with coarse atherosclerotic calcification and mild narrowing.   The posterior circulation is patent without significant stenosis.        CT HEAD:   No acute intracranial hemorrhage or mass effect.     ---     CT head wo IV contrast 11/11/2024 9:03 pm     FINDINGS:   CSF Spaces: Ventricles, cortical sulci and basal cisterns are prominent reflecting age related involutional changes and mild volume loss. Mildly prominent extra-axial CSF space within the bifrontal region may represent asymmetric volume loss. There is no extraaxial fluid collection.     Parenchyma: There is no acute  intraparenchymal hemorrhage or parenchymal evidence of acute large territorial ischemic infarction. Patchy white matter hypodensity likely represents microangiopathy. Lucency within the right basal ganglia likely represents a lacunar infarct. Small area of encephalomalacia and gliosis within the right occipital lobe. Of note multiple intracranial vessels are hyperdense, likely secondary to recent IR neuro angiogram. The grey-white differentiation is intact. There is no mass effect or midline shift.     Calvarium: The calvarium is unremarkable.     Paranasal sinuses and mastoids: Visualized paranasal sinuses and mastoids are clear.        No acute intracranial hemorrhage or mass effect.   Small focal area of encephalomalacia and gliosis within the right occipital lobe.   Mild white matter changes compatible with microangiopathy. Lacunar infarct within the right basal ganglia.              Assessment/Plan   This patient currently has cardiac telemetry ordered; if you would like to modify or discontinue the telemetry order, click here to go to the orders activity to modify/discontinue the order.  Assessment & Plan  Cerebrovascular accident (CVA) due to occlusion of left middle cerebral artery (Multi)    Ms. Rao is an 80 yo female with history of stroke in 7/2024 HTN, HLD, R CEA 2011, HFpEF, COPD, anxiety, partial hepatectomy for HCC 2019, mRS 0,  transferred from OSH for acute left MCA infarct with NIH 21, s/p TNK. MT performed, but patient's left had already recanalized with TNK (therefore cannot use Tici Scoring). Thrombectomy complicated by severe diffuse atherosclerosis of vasculature, requiring multiple entry attempts/passes. Access gained via right radial artery. NIH 20 s/p thrombectomy with RUE/RLE antigravity, improved from prior. Admitting to [facility]U for continued monitoring. Spontaneous recanalization s/p TNK, Notable NIHSS decrease from initial score of 21 demonstrates marked improvement from time of  presentation to [facility]. Initial SBP goal <180, however the patient has had BP dependant examinations, reportedly revealing deterioration below -180 by several [facility] clinicians 11/11-12. SBP goals have been set at 180-200, maintained with vasopressors; plan to decrease to 170-180 as tolerated today.  Given permissive HTN and increase in SBP goals with use of vasopressors, ctm for intracranial hemorrhage, remain NSU status. Patient's  clarified she has been on DAPT since 07/2024, rec continuation of aspirin and initiation of clopidogrel based on MRI Head w/o contrast findings. Given the approximately 65% stenosis noted proximal to the left carotid bifurcation, plan for potential L carotid artery stenting within 2 weeks iso complete occlusion of the right common carotid artery and right cervical internal carotid artery, will plan to consult neurosurgery.      Type: Ischemic stroke  Subtype/etiology: Artery to artery  Vessels involved: left MCA  Neurological manifestations:  NIHSS (worst at presentation): 21   Antiplatelet/antithrombotic plan for stroke prevention: Aspirin, NEW recommendation to start clopidogrel  VTE prophylaxis: SCDs, Heparin 5000 units Q8hr     Vascular Risk Factor modification goals:  Blood pressure goals: avoid hypotension SBP <100 that could worsen cerebral perfusion, Ischemic stroke post-thrombectomy   Lipid Goals: education on healthy diet and statin therapy to maintain or achieve goal LDL-cholesterol < 70mg  Glucose Goals: early treatment of hyperglycemia to goal glucose 140-180 mg/dl with long-term goal A1c < 7%   Smoking Cessation and Education  Assessment for Rehabilitation needs   Patient and family education on signs and symptoms of stroke, calling 911, healthy strategies for stroke prevention.       Updates 11/13/2024:  - NIHSS still fluctuant per nursing staff  - MRI: L cerebral hemisphere acute/subacute watershed ischemia; subcentimeter cortical infarctions left superior  frontal lobe, left parietal and temporal lobes. No evidence of hemorrhagic transformation. Nonspecific white matter changes   - TTE: No intracardiac shunt; bubble study negative. LVEF estimate 65-70%. No LV thrombus. LA dilatation  - CT Aorta-Iliofemoral w/ runoff: Extensive vascular disease including occlusion of the infrarenal abdominal aorta, bilateral common iliac arteries, no flow appreciated left PT artery --> vascular evaluated, likely chronic. No operative plans, continue to monitor   - 11/12 initiation of aspirin, subcutaneous heparin   - SLP rec NPO  - PT/OT evaluation deferred 11/12 due to BP dependant exam, to be completed in future.   - NSU status, NSU is primary team     # Acute Left MCA infarct s/p TNK (11/11 @ 1255) and MT (already recanalized- no Tici scoring)  : : LDL 92; A1c 5.8%  : : NIHSS 21 at time of transfer to [facility]--> 6 this morning.     : : MRI brain w/o contrast with L cerebral hemisphere acute-subacute ischemia; subcentimeter cortical infarcts of L superior frontal, parietal, annd temporal lobes.   : : CTA with atherosclerotic calcification at the left carotid bifurcation with approximately 65% stenosis by NASCET criteria  : : TTE: Bubble study negative. LVEF 65-70%. No thrombus. Severe LA dilatation.     Recommendations & Plan:  - Q1 neuro checks, remain NSU status for vasopressor support  - BP goal s/p TNK <180/105mm Hg; rec decrease SBP goal from 180-200 to 160-180 as tolerated due to recent BP dependant exams, ctn Q1 neuro checks for signs of ICH..    - Start clopidogrel, ctn aspirin for antiplatelet therapy  - DVT Ppx: continue SCDs, ctn Q8hr subcutaneous heparin   - Ctn atorvastatin 80mg  - Pain and Sedation: acetaminophen PRN  - PT/OT eval  - [ ] Consult neurosurgery for L carotid intervention iso chronic R carotid occlusion, CTA with atherosclerotic calcification at the left carotid bifurcation with approximately 65% stenosis by NASCET criteria    José Miguel Flores MS3    I have  reviewed and edited the information above and I agree with the assessment and plan as outlined by the medical student.    Corey Domingo DO  Neurology, PGY-2  Attending Attestation:   I, or a resident under my supervision, was present with the medical student who participated in the documentation of this note.  I have personally seen and examined the patient and performed the medical decision-making components. I have reviewed the medical student documentation and/or resident documentation and verified the findings in the note as written with additions or exceptions as stated in the body of the note.    L MCA stroke.  MRI showed small areas of stroke, watershed  Started on ASA. Resume plavix.  L ICA stenosis, NSGY consult for possible revascularization.    TTE with good EF.  Goal SBP >180 right now. Consider relaxing goal slightly.      Saurabh Buckner M.D.

## 2024-11-13 NOTE — PROGRESS NOTES
Occupational Therapy    Evaluation/Treatment    Patient Name: Zulma Rao  MRN: [id number]  Department: [facility]  Room: 09/09-A  Today's Date: 11/13/24  Time Calculation  Start Time: 0921  Stop Time: 1012  Time Calculation (min): 51 min       Assessment:  OT Assessment: Pt demonstrated deficits in ADL/IADL particiption, bed mobility, transfers, functional mobility, endurance, fine/gross motor coordination, cognition, sensation, trunk control, and UE strength/ROM. Pt would benefit from skilled OT services to address deficits and increase occupational performance for a safe return home.  Prognosis: Good  Barriers to Discharge: None  Evaluation/Treatment Tolerance: Patient tolerated treatment well  Medical Staff Made Aware: Yes  End of Session Communication: Bedside nurse  End of Session Patient Position: Bed, 3 rail up, Alarm off, not on at start of session  OT Assessment Results: Decreased ADL status, Decreased upper extremity range of motion, Decreased upper extremity strength, Decreased cognition, Decreased endurance, Decreased sensation, Decreased fine motor control, Decreased functional mobility, Decreased gross motor control, Decreased IADLs, Decreased trunk control for functional activities  Prognosis: Good  Barriers to Discharge: None  Evaluation/Treatment Tolerance: Patient tolerated treatment well  Medical Staff Made Aware: Yes  Strengths: Support and attitude of living partners  Barriers to Participation: Comorbidities  Plan:  Treatment Interventions: ADL retraining, Functional transfer training, UE strengthening/ROM, Endurance training, Cognitive reorientation, Patient/family training, Neuromuscular reeducation, Fine motor coordination activities, Compensatory technique education  OT Frequency: 4 times per week  OT Discharge Recommendations: High intensity level of continued care  Equipment Recommended upon Discharge:  (TBD)  OT - OK to Discharge: Yes  Treatment Interventions: ADL retraining, Functional  transfer training, UE strengthening/ROM, Endurance training, Cognitive reorientation, Patient/family training, Neuromuscular reeducation, Fine motor coordination activities, Compensatory technique education    Subjective   Current Problem:  1. Cerebrovascular accident (CVA) due to occlusion of left middle cerebral artery (Multi)  Transthoracic Echo (TTE) Complete    Transthoracic Echo (TTE) Complete        General:   OT Received On: 11/13/24  General  Reason for Referral: Pt presented to OSH with L facial droop, L gaze, and not communicating. NIH on arrival was 20. CTH completed showing dense L MCA sign. CTA head and neck completed showing proximal L M1 occlusion and what appears to be chronic R ICA occlusion. Pt given TNK and life flighted to [facility] for MT eval. NIH on arrival was 21. MT c/b severe diffuse atherosclerosis of vasculature, requiring multiple entry attempts/passes and post NIH score was 20. S/p TNK and spontaneous recanalization on angiogram TICI 3 (11/11). CTH no acute intracranial hemorrhage or mass effect and CTA HN complete occlusion of R CCA and R cervical ICA (11/12).  Past Medical History Relevant to Rehab: prior R sided stroke with left sided deficit (per personal review of imaging, L sided posterior limb of internal capsule - 7/2024), bilateral carotid artery stenosis (s/p R CEA 2011), HFpEF, NSTEMI, T2DM, HTN, DLD, hypothyroidism, COPD, HCC (s/p partial hepatectomy - 2019), anxiety, b/l cataract  Missed Visit: No  Family/Caregiver Present: No  Prior to Session Communication: Bedside nurse  Patient Position Received: Bed, 3 rail up, Alarm off, not on at start of session  General Comment: Pt agreeable and cooperative. Pt with A-line, hernandez, 1L O2 NC, .02-.03 levo, and tele.  Precautions:  Hearing/Visual Limitations: Hearing and vision WFL. Pt with hx of b/l cataracts. Pt reports she does not wear glasses  Medical Precautions: Fall precautions, Cardiac precautions  Precautions Comment: SBP  180-200      Vital Signs     Vitals Session Pre OT During OT Post OT   Heart Rate 76  76   Resp  20  23   SpO2 100  99   /65  175/63   MAP   102   ICP            Vital Signs Comment: Pt with -205 while sitting with bed in chair position. RN notified and decreased levo from .03 to .02 - pt SBP decreased to 190s after. Pt SBP decreased to 150s after position change of supine>sit EOB. Pt moved back to supine position and SBP recovered to low 180s with rest. RN notified and aware.     Pain:  Pain Assessment  Pain Assessment: 0-10  0-10 (Numeric) Pain Score:  (Pt reported pain in low back and BLE but did not rate pain)    Objective   Cognition:  Overall Cognitive Status: Impaired  Orientation Level: Oriented X4 (Pt required choices for place.)  Following Commands:  (Pt follows 85% one-step commands with increased time, repetition, and verbal/tactile cues)  Cognition Comments: Pt required increased time to answer questions and during conversation. Pt required repetition and verbal cueing during session for tasks.  Attention: Exceptions to WFL  Memory: Exceptions to WFL  Problem Solving: Exceptions to WFL  Numeric Reasoning: Exceptions to WFL   Abstract Reasoning: Exceptions to WFL  Safety/Judgement: Within Functional Limits  Insight: Mild  Impulsive: Within functional limits  Processing Speed: Delayed  Cognition Test Scores  Cognition Tests: Cognition Test Performed  SBT Test Score: Pt scored 18/28 which indicates impairment consistent with dementia (normal = 0-4, impairment consistent with dementia 10+). Pt unable to count backwards from 20, recite the months in reverse order, and recall all parts of the address.  Confusion Assessment Method (CAM)  Acute Onset and Fluctuating Course (1A): Yes  Acute Onset and Fluctuating Course (1B): Yes  Inattention (2): Yes  Disorganized Thinking (3): No  Rate Patient's Level of Consciousness (4): Alert (Normal), No  Delirium Present: No  Mcintosh Agitation Sedation  Scale  Mcintosh Agitation Sedation Scale (RASS): Alert and calm  Home Living:  Type of Home: House  Lives With: Spouse  Home Adaptive Equipment: Walker rolling or standard (Pt uses walker in home and in community)  Home Layout: One level  Home Access: Stairs to enter without rails  Entrance Stairs-Rails: None  Entrance Stairs-Number of Steps: 2  Bathroom Shower/Tub: Walk-in shower  Bathroom Toilet: Standard  Bathroom Equipment: Grab bars in shower, Hand-held shower hose, Shower chair with back, Grab bars around toilet  Prior Function:  Level of [place]: Independent with ADLs and functional transfers, Independent with homemaking with ambulation  ADL Assistance: Independent  Homemaking Assistance: Independent  Ambulatory Assistance: Independent  Leisure: Pt enjoys watching tv  Hand Dominance: Right  Prior Function Comments: (-) drives  IADL History:  Homemaking Responsibilities: No  ADL:  Eating Assistance: Minimal  Eating Deficit: Setup, Verbal cueing, Increased time to complete  Grooming Assistance: Minimal  Grooming Deficit: Supervision/safety, Steadying, Verbal cueing, Increased time to complete  Bathing Assistance: Maximal  Bathing Deficit: Supervision/safety, Steadying, Verbal cueing, Increased time to complete   UE Dressing Assistance: Moderate  UE Dressing Deficit: Supervision/safety, Steadying, Verbal cueing, Increased time to complete  LE Dressing Assistance: Maximal  LE Dressing Deficit: Supervision/safety, Steadying, Verbal cueing, Increased time to complete  Toileting Assistance with Device: Maximal  Toileting Deficit: Supervison/safety, Steadying, Verbal cueing, Increased time to complete  Functional Assistance: Maximal  Functional Deficit: Supervision/safety, Steadying, Verbal cueing, Increased time to complete  Activities of Daily Living: LE Dressing  LE Dressing: Yes  Sock Level of Assistance: Maximum assistance  LE Dressing Where Assessed: Bed level  LE Dressing Comments: Pt required max  assistance to place BLE into figure four position. Pt demonstrated difficulty manipulating the sock with the LUE. Pt neglecting the RUE during the task and required verbal cues to incorporate the RUE to promote bilateral integration. Pt required verbal cues for seuqencing. Pt unable to fully reach anteriorly to touch BLE and required total assistance to don socks.  Activity Tolerance:  Endurance: Tolerates 10 - 20 min exercise with multiple rests  Early Mobility/Exercise Safety Screen: Proceed with mobilization - No exclusion criteria met  Functional Standing Tolerance:     Bed Mobility/Transfers: Bed Mobility  Bed Mobility: Yes  Bed Mobility 1  Bed Mobility 1: Supine to sitting, Sitting to supine  Level of Assistance 1: Maximum verbal cues  Bed Mobility Comments 1: HOB elevated for supine>sit, HOB flat for sit>supine. Drawsheet utilized. Pt able to use LUE to grab bed rail and assist in transfer.    Sitting Balance:  Static Sitting Balance  Static Sitting-Level of Assistance: Maximum assistance         Therapy/Activity: Therapeutic Activity  Therapeutic Activity Performed: Yes  Therapeutic Activity 1: Pt sat with progressive HOB elevation to bed in chair position to monitor vitals. Pt with -205 while sitting with bed in chair position. RN notified and decreased levo from .03 to .02 - pt SBP decreased to 190s after. Pt completed supine>sit with max assistance. Pt sat EOB one minute with max assistance to increase sitting/activity tolerance, but demonstrated a decrease in SBP to 150s after position change. Pt moved back to supine position and SBP recovered to low 180s with rest. RN notified and aware. Pt required increased time and repetition/verbal cues throughout the session to complete tasks. Other vitals monitored throughout session - VSS.       Vision:Vision - Basic Assessment  Current Vision: No visual deficits  Visual History: Cataracts   and Vision - Complex Assessment  Tracking: [facility]  Sensation:  Light  Touch: Partial deficits in the RUE (Pt able to identify light touch in the lateral arm proximal to the elbow. Pt able to detect light touch in the LUE, LLE, and RLE.)  Strength:     Other Activity:     Home Environment:     Perception:  Inattention/Neglect: Cues to attend to right side of body  Initiation: Cues to initiate tasks  Motor Planning: Appears intact  Perseveration: Not present  Coordination:  Movements are Fluid and Coordinated: No  Finger to Nose: Impaired (LUE with bradykinesia)  Heel to Shin: Impaired  Coordination Comment: BUE oppostition with bradykinesia and slight dysmetria   Hand Function:  Hand Function  Gross Grasp: Functional (RUE strength 3-/5, LUE strength 3+/5)  Coordination: Impaired    Outcome Measures: [facility] Daily Activity  Putting on and taking off regular lower body clothing: A lot  Bathing (including washing, rinsing, drying): A lot  Putting on and taking off regular upper body clothing: A lot  Toileting, which includes using toilet, bedpan or urinal: A lot  Taking care of personal grooming such as brushing teeth: A little  Eating Meals: A little  Daily Activity - Total Score: 14        , Confusion Assessment Method-ICU (CAM-ICU)  Feature 1: Acute Onset or Fluctuating Course: Positive  Feature 2: Inattention: Positive  Feature 3: Altered Level of Consciousness: Negative  Feature 4: Disorganized Thinking: Negative  Overall CAM-ICU: Negative  , and Early Mobility/Exercise Safety Screen: Proceed with mobilization - No exclusion criteria met  ICU Mobility Scale: Sitting over edge of bed [3]    Education Documentation  Body Mechanics, taught by DANIELA Mi at 11/13/2024  1:58 PM.  Learner: Patient  Readiness: Acceptance  Method: Explanation, Demonstration  Response: Verbalizes Understanding  Comment: Pt educated on role of OT and OT POC    Precautions, taught by DANIELA Mi at 11/13/2024  1:58 PM.  Learner: Patient  Readiness: Acceptance  Method: Explanation,  Demonstration  Response: Verbalizes Understanding  Comment: Pt educated on role of OT and OT POC    ADL Training, taught by DANIELA Mi at 11/13/2024  1:58 PM.  Learner: Patient  Readiness: Acceptance  Method: Explanation, Demonstration  Response: Verbalizes Understanding  Comment: Pt educated on role of OT and OT POC    Education Comments  No comments found.        OP EDUCATION:       Goals:  Encounter Problems       Encounter Problems (Active)       ADLs       Patient will perform UB and LB bathing with minimal assistance and verbal cues (Progressing)       Start:  11/13/24    Expected End:  11/27/24            Patient will independently complete upper body dressing donning and doffing all UE clothes  (Progressing)       Start:  11/13/24    Expected End:  11/27/24            Patient will complete lower body dressing donning and doffing all LE clothes with minimal assistance and verbal cues (Progressing)       Start:  11/13/24    Expected End:  11/27/24            Patient will independently complete daily grooming tasks.  (Progressing)       Start:  11/13/24    Expected End:  11/27/24            Patient will complete toileting including hygiene and clothing management with minimal assistance and verbal cues  (Progressing)       Start:  11/13/24    Expected End:  11/27/24               BALANCE       Pt will tolerate static/dynamic sitting tasks >10 min with CGA and without LOB during ADL tasks in order to return to PLOF.  (Progressing)       Start:  11/13/24    Expected End:  11/27/24               COGNITION/SAFETY       Patient will score WFL on standardized cognitive assessment with no cues and within reasonable time frame (Progressing)       Start:  11/13/24    Expected End:  11/27/24            Patient will follow 100% one-step commands with no verbal cues to allow improved ADL performance.  (Progressing)       Start:  11/13/24    Expected End:  11/27/24            Pt will complete ADL tasks involving  sequencing, memory, and problem-solving with 100% accuracy.   (Progressing)       Start:  11/13/24    Expected End:  11/27/24               EXERCISE/STRENGTHENING       Patient will complete BUE exercises in order to improve strength to 4/5 in LUE and >/= 3/5 in RUE for ADL performance.   (Progressing)       Start:  11/13/24    Expected End:  11/27/24               TRANSFERS       Patient will perform bed mobility with minimal assistance and verbal cues in order to improve safety and independence with mobility.  (Progressing)       Start:  11/13/24    Expected End:  11/27/24            Patient will complete functional transfers with moderate assistance and LRAD as needed (Progressing)       Start:  11/13/24    Expected End:  11/27/24

## 2024-11-13 NOTE — PROGRESS NOTES
[facility]  NEUROSCIENCE INTENSIVE CARE UNIT  DAILY PROGRESS NOTE       Patient Name: Zulma Rao   MRN: [id number]     Admit Date: 2024     : 1945 AGE: 79 y.o. GENDER: female        Subjective    79 y.o. female with PMH prior R sided stroke with left sided deficit (per personal review of imaging, L sided posterior limb of internal capsule) 2024, bilateral carotid artery stenosis S/P R CEA , HFpEF, NSTEMI, T2DM, HT, DLD, hypothyroidism, COPD, HCC S/P partial hepatectomy (), anxiety, b/l cataracts.  Admitted 2024 with Cerebrovascular accident (CVA) due to occlusion of left middle cerebral artery (Multi) after transferred for mechanical thrombectomy.     History obtained from  Mynor, who reported that pt was home for lunch today, last known well was 12pm. She was standing in the kitchen, and suddenly started having whole body shaking. He was able to catch her before falling to ground. Noticed that she had left sided facial droop that was new and pt was looking to the left, not speaking or communicating with him. He called 911 and EMS brought her to the [facility] ED. NIH on arrival was 20. BP on arrival was:     CTH completed showing dense left MCA sign. CTA head and neck completed showing proximal left M1 occlusion and what appears to be chronic right ICA occlusion. She was given TNK at 12:55. Life flighted to [facility] for MT pamelaal. NIH on arrival was 21 (breakdown below), BP on arrival was 130/93 with BG 93. Mechanical thrombectomy completed and post NIH score was:  Admitted to NSU.      Pt's  reports history of stroke in July of this year for which she originally had left sided weakness and facial droop that completely resolved and was back to her normal baseline prior to stroke. Was able to ambulate independently, but then fell in September and broke her right hip requiring surgical fixation. She was doing well, walking with walker for long distance but  recently has been ambulating independently.  reported no history of arrhythmias and reports pt was taking daily plavix.       Significant Events:  - 11/11: TNK and spontaneous recanalization on angiogram TICI 3  - 11/13: Had pressure-dependent exam, placed on Levo and Miguel. NIH 4    Interval Events:   11/13/24 Overnight: no acute events. This morning patient awake, alert, oriented x 3. Mild aphasia and dysarthria. Follows commands. SANTOS 3/5. Discussed with neuro/stroke -> will liberate to goal -180, assess neuro exam.       Objective   VITALS (24H):  Temp:  [36.1 °C (97 °F)-37.1 °C (98.8 °F)] 36.5 °C (97.7 °F)  Heart Rate:  [] 74  Resp:  [5-25] 17  BP: (186-237)/(72-91) 200/72  Arterial Line BP 1: (138-239)/() 198/64  INTAKE/OUTPUT:  Intake/Output Summary (Last 24 hours) at 11/13/2024 0752  Last data filed at 11/13/2024 0700  Gross per 24 hour   Intake 2995.35 ml   Output 3130 ml   Net -134.65 ml     VENT SETTINGS:        PHYSICAL EXAM:  NEURO:   - Alert, oriented x2-3, follows simple commands  - EOS, PERRL, EOMI, VFF  - SANTOS 3/5 strength  CV:  - RRR on telemetry, NSR  - Unable to palpate or doppler pulse in both legs  RESP:  - No signs of resp distress  - Oxygen: Nasal cannula 2L  :  - External catheter in place  GI:  - Abdomen NT/ND, soft  SKIN:  - Intact    11/13/24 @ 8:00AM  NIHSS:   - Level of consciousness: 0 = Alert  - LOC Question: 1 = One correct  - LOC Commands: 0 = Obeys both  - Best Gaze: 0 = Normal  - Visual Field: 0 = No visual loss  - Facial Paresis: 0 = Normal  - LUE: 0 = No drift; 10 sec  - RUE: 0 = No drift; 10 sec  - LLE: 0 = No drift; 5 sec  - RLE: 0 = No drift; 5 sec  - Limb Ataxia: 0 = Absent  - Sensory: 0 = Absent  - Best Language: 1 = Mild-moderate aphasia  - Dysarthria: 1 = Mild-moderate  - Neglect: 0 = None  TOTAL: 6       MEDICATIONS:  Scheduled: PRN: Continuous:   aspirin, 300 mg, rectal, Daily  atorvastatin, 80 mg, oral, Nightly  azithromycin, 250 mg, oral, Once  per day on Monday Wednesday Friday  busPIRone, 10 mg, oral, TID  pantoprazole, 40 mg, oral, Daily before breakfast   Or  esomeprazole, 40 mg, nasoduodenal tube, Daily before breakfast   Or  pantoprazole, 40 mg, intravenous, Daily before breakfast  tiotropium, 2 puff, inhalation, Daily   And  fluticasone furoate-vilanteroL, 1 puff, inhalation, Daily  heparin (porcine), 5,000 Units, subcutaneous, q8h  insulin lispro, 0-5 Units, subcutaneous, TID AC  levothyroxine, 25 mcg, intravenous, q24h VIVIAN  [Held by provider] levothyroxine, 25 mcg, oral, Daily  lidocaine, 5 mL, infiltration, Once  lidocaine, 1 patch, transdermal, Daily  magnesium sulfate, 4 g, intravenous, Once  melatonin, 3 mg, oral, Nightly  methocarbamol, 500 mg, intravenous, q8h  polyethylene glycol, 17 g, oral, Daily  potassium phosphate, 15 mmol, intravenous, Once  sennosides, 2 tablet, oral, BID     PRN medications: acetaminophen **OR** acetaminophen, alteplase, calcium gluconate, calcium gluconate, dextrose, dextrose, glucagon, glucagon, hydrALAZINE **FOLLOWED BY** hydrALAZINE, ipratropium-albuteroL, labetaloL, magnesium sulfate, magnesium sulfate, ondansetron ODT **OR** ondansetron, oxygen, potassium chloride CR **OR** potassium chloride, potassium chloride CR **OR** potassium chloride, potassium chloride norepinephrine, 0.01-1 mcg/kg/min, Last Rate: 0.03 mcg/kg/min (11/13/24 0700)  phenylephrine, 0-9 mcg/kg/min, Last Rate: 1 mcg/kg/min (11/13/24 0542)  sodium chloride 0.9%, 50 mL/hr, Last Rate: 50 mL/hr (11/13/24 0535)         LAB RESULTS:  Results from last 72 hours   Lab Units 11/13/24  0041 11/12/24  1254 11/12/24  0045   GLUCOSE mg/dL 125* 104* 152*   SODIUM mmol/L 142 141 141   POTASSIUM mmol/L 3.3* 3.1* 3.0*   CHLORIDE mmol/L 105 106 104   CO2 mmol/L 28 27 24   ANION GAP mmol/L 12 11 16   BUN mg/dL 4* 3* 5*   CREATININE mg/dL 0.38* 0.39* 0.47*   EGFR mL/min/1.73m*2 >90 >90 >90   CALCIUM mg/dL 7.6* 7.3* 7.6*   PHOSPHORUS mg/dL 2.0* 2.3* 3.2    ALBUMIN g/dL 3.2* 3.2* 3.4   MAGNESIUM mg/dL 2.42*  --  1.65   POCT CALCIUM IONIZED (MMOL/L) IN BLOOD mmol/L 1.01*  --  1.02*      Results from last 72 hours   Lab Units 11/13/24  0041 11/12/24  0045   WBC AUTO x10*3/uL 14.2* 10.6   NRBC AUTO /100 WBCs 0.0 0.0   RBC AUTO x10*6/uL 3.78* 3.94*   HEMOGLOBIN g/dL 11.4* 11.7*   HEMATOCRIT % 32.7* 35.0*   MCV fL 87 89   MCH pg 30.2 29.7   MCHC g/dL 34.9 33.4   RDW % 13.7 13.3   PLATELETS AUTO x10*3/uL 186 165   NEUTROS PCT AUTO % 70.4 90.4   IG PCT AUTO % 0.4 0.3   LYMPHS PCT AUTO % 15.6 5.2   MONOS PCT AUTO % 11.9 4.0   EOS PCT AUTO % 1.1 0.0   BASOS PCT AUTO % 0.6 0.1   NEUTROS ABS x10*3/uL 10.02* 9.54*   IG AUTO x10*3/uL 0.06 0.03   LYMPHS ABS AUTO x10*3/uL 2.21 0.55*   MONOS ABS AUTO x10*3/uL 1.69* 0.42   EOS ABS AUTO x10*3/uL 0.15 0.00   BASOS ABS AUTO x10*3/uL 0.08 0.01      Results from last 72 hours   Lab Units 11/13/24  0041 11/12/24  1254 11/12/24  0045 11/11/24  1757   ALK PHOS U/L  --   --   --  187*   BILIRUBIN TOTAL mg/dL  --   --   --  3.7*   BILIRUBIN DIRECT mg/dL  --   --   --  1.8*   PROTEIN TOTAL g/dL  --   --   --  6.2*   ALT U/L  --   --   --  64*   AST U/L  --   --   --  53*   ALBUMIN g/dL 3.2* 3.2*   < > 2.7*    < > = values in this interval not displayed.      IMAGING RESULTS:  Reviewed OSH imaging on PACS  CT head wo IV contrast    Result Date: 11/12/2024  CTA neck:   Complete occlusion of the right common carotid artery and right cervical internal carotid artery.   Coarse atherosclerotic calcification at the left carotid bifurcation with approximately 65% stenosis by NASCET criteria.   The vertebral arteries are patent. The left vertebral artery is dominant. There is a coarse calcification at the origin of the right vertebral artery.   Calcified and noncalcified plaque along the aortic arch and origins of the great vessels with narrowing.   CTA head:   There is an abrupt cutoff of the distal left anterior cerebral artery.   Occlusion of the  right petrous and cavernous carotid arteries with reconstitution in the supraclinoid segment.   The left intracranial carotid artery is patent with coarse atherosclerotic calcification and mild narrowing.   The posterior circulation is patent without significant stenosis.   CT head:   No acute intracranial hemorrhage or mass effect.   I personally reviewed the images/study and I agree with the findings as stated by Simone Joy MD (Radiology Resident).   MACRO: Simone Joy discussed the significance and urgency of this critical finding by telephone with  Jeaneth Graves MD on 11/12/2024 at 5:59 am.  (**-RCF-**) Findings:  See findings.   .   Signed by: Latesha Efrain 11/12/2024 7:20 AM Dictation workstation:   [id number]    CT angio head and neck w and wo IV contrast    Result Date: 11/12/2024  CTA neck:   Complete occlusion of the right common carotid artery and right cervical internal carotid artery.   Coarse atherosclerotic calcification at the left carotid bifurcation with approximately 65% stenosis by NASCET criteria.   The vertebral arteries are patent. The left vertebral artery is dominant. There is a coarse calcification at the origin of the right vertebral artery.   Calcified and noncalcified plaque along the aortic arch and origins of the great vessels with narrowing.   CTA head:   There is an abrupt cutoff of the distal left anterior cerebral artery.   Occlusion of the right petrous and cavernous carotid arteries with reconstitution in the supraclinoid segment.   The left intracranial carotid artery is patent with coarse atherosclerotic calcification and mild narrowing.   The posterior circulation is patent without significant stenosis.   CT head:   No acute intracranial hemorrhage or mass effect.   I personally reviewed the images/study and I agree with the findings as stated by Simone Joy MD (Radiology Resident).   MACRO: Simone Joy  discussed the significance and urgency of this critical finding by telephone with  Jeaneth Graves MD on 11/12/2024 at 5:59 am.  (**-RCF-**) Findings:  See findings.   .   Signed by: Latesha Zuniga 11/12/2024 7:20 AM Dictation workstation:   [id number]    CT head wo IV contrast    Result Date: 11/12/2024  No acute intracranial hemorrhage or mass effect.   Small focal area of encephalomalacia and gliosis within the right occipital lobe.   Mild white matter changes compatible with microangiopathy. Lacunar infarct within the right basal ganglia.   I personally reviewed the images/study and I agree with the findings as stated by Simone Joy MD (Radiology Resident).   MACRO: None   Signed by: Latesha Zuniga 11/12/2024 7:03 AM Dictation workstation:   [id number]       Assessment/Plan    Update 11/13/24  :: CTH (11/12): No acute intracranial hemorrhage or mass effect  :: CTA HN (11/12): complete occlusion of R CCA and R cervical ICA. 65% stenosis of L   Carotid bifurcation, abrupt cutoff of the distal L [place], occlusion of the right petrous and cavernous carotid arteries  :: MRI Brain (11/12): Acute/subacute watershed ischemia throughout the left cerebral hemisphere, subcentimeter cortical infarctions upon the left superior frontal lobe, left parietal and temporal lobes  :: CT Aorta-Iliofemoral w/ runoff: Extensive vascular disease including occlusion of the infrarenal abdominal aorta, bilateral common iliac arteries, no flow appreciated left PT artery --> vascular evaluated, likely chronic. No operative plans, continue to monitor  - Stoke following, per note: Maintain -200  - Consider restarting ASA, DVT prophylaxis  - c/w NSS 50ml/hr  - liberate SBP goal to 160-180    Dispo:  Stroke  -----    NEURO:  # L MCA stroke S/P TNK, Spontaneous recanalization TICI 3  # R sided stroke with left sided deficit (per personal review of imaging L sided posterior limb of internal capsule) 7/2024  # Bilateral carotid  artery stenosis S/P R CEA 2011  Assessment:  - Neurologically: see exam above  - Fluctuating exams at times  - LDL (11/11) 92, A1c (11/11) 5.8  - BNP (11/11) 188  Plan:  - NSU  - Neuro Checks: Q1H  - Sedation: None  - Pain: acetaminophen PRN, Q6H  - Nausea: ondansetron and None  - PT/OT/SLP  - c/w home Atorvastatin 80mg  - c/w aspirin (rectal while NPO)  - Hold home ASA, plavix 75mg daily --> discuss with Stroke team about resumption timing  - Discussed with stroke, will liberate SBP goal to 160-180    CARDIOVASCULAR:  #Severe PAD 2/2 Chronic infrarenal aortic occlusion  # HFpEF  # History of HTN, HLD  Assessment:  - EKG: pending  - ECHO 11/12/24: LVEF 65-70%, LVH, severely dilated LA, negative bubble study  - CT Aorti-ileofemoral w/ runoff: Extensive vascular disease including occlusion of the infrarenal abdominal aorta, bilateral common iliac arteries, no flow appreciated left PT   Plan:  - Continue to monitor on telemetry  - BP goal: 160-180  --> on Levo and Miguel, wean as tolerated  - c/w monitor doppler  - Vasc Sx following, per note: no acute intervention . Good flow to radial artery. Chronic infrarenal aortic occlusion. Continue to monitor doppler signals    RESPIRATORY:  # COPD  Assessment:  - PFT (10/05/2023 at OSH): FEV1 50% predicted. FEV1/FVC 69%. No TLC obtained. DLCO 40%   Plan:  - Continuous pulse oximetry   - O2 PRN to maintain SpO2 > 88%, wean as tolerated  - Incentive spirometry while awake  - Continue Trelegy Ellipta (change to inpatient formula)    RENAL/:  #JAIME, resolved  Assessment:  - Baseline BUN/Cr: 12/0.63  Results from last 72 hours   Lab Units 11/13/24  0041 11/12/24  1254   BUN mg/dL 4* 3*   CREATININE mg/dL 0.38* 0.39*       - likely from contrast, now resolved with renal function return to baseline  Plan:  - Monitor with daily RFP  - Maintain external urinary diversion device for strict I&O  - c/w NSS 50ml/hr    FEN/GI:  # HCC S/P partial hepatectomy (2019)  Assessment:  - Last BM:  PTA  Plan:  - Monitor and replace electrolytes per protocol  - IVF: None  - Diet: NPO , pending SLP eval  - May require Corpak   - Bowel Regimen: Docusate-Senna BID and Miralax QD    ENDOCRINE:  # T2DM  # Hypothyroidism  Assessment:  Results from last 7 days   Lab Units 24  1946 24  1601 24  1254 24  0855 24  0045 24  1958 24  1757 24  1351   POCT GLUCOSE mg/dL  --  118* 137*  --  89  --  189*  --  130*   GLUCOSE mg/dL 125*  --   --  104*  --    < >  --    < >  --     < > = values in this interval not displayed.   - HbA1C: 6.3 2024   Plan:  - Accuchecks & ISS Q4H   - Hold home metformin  - Continue home Levothyroxine  - Hypoglycemia monitoring     HEMATOLOGY:  #JOSE  Assessment:  - Baseline Hgb: 11.6  - Baseline Plts: 258  Results from last 7 days   Lab Units 24  004   HEMOGLOBIN g/dL 11.4* 11.7*   HEMATOCRIT % 32.7* 35.0*   PLATELETS AUTO x10*3/uL 186 165     Results from last 72 hours   Lab Units 24  004   WBC AUTO x10*3/uL 14.2* 10.6   NRBC AUTO /100 WBCs 0.0 0.0   RBC AUTO x10*6/uL 3.78* 3.94*   HEMOGLOBIN g/dL 11.4* 11.7*   HEMATOCRIT % 32.7* 35.0*   MCV fL 87 89   MCH pg 30.2 29.7   MCHC g/dL 34.9 33.4   RDW % 13.7 13.3   PLATELETS AUTO x10*3/uL 186 165      Plan:  - Continue to monitor with daily CBC and Coag panel    INFECTIOUS DISEASE:  #JOSE  Assessment:  Results from last 7 days   Lab Units 24   WBC AUTO x10*3/uL 14.2* 10.6    - Temp (24hrs), Av.4 °C (97.6 °F), Min:36.1 °C (97 °F), Max:37.1 °C (98.8 °F)     Plan:  - Continue to monitor for s/sx of infection  - Pan culture for temperature > 38.4 C    MUSCULOSKELETAL:  - No acute issues    SKIN:  - No acute issues  - Turns and skin care per NSU protocol    ACCESS:  - PIV    PROPHYLAXIS:  - DVT Ppx: Pharmacological DVT ppx on hold until 24hr after TNK due to risk of bleeding  - GI Ppx: Not indicated    RESTRAINTS:  Not  indicated/Patient does not meet criteria for restraints    Kalen Tejada, APRN-CNP  Neuro Critical Care

## 2024-11-13 NOTE — CARE PLAN
Problem: Pain - Adult  Goal: Verbalizes/displays adequate comfort level or baseline comfort level  Outcome: Progressing     Problem: Safety - Adult  Goal: Free from fall injury  Outcome: Progressing  Flowsheets (Taken 11/13/2024 1635)  Free from fall injury: Based on caregiver fall risk screen, instruct family/caregiver to ask for assistance with transferring infant if caregiver noted to have fall risk factors     Problem: Fall/Injury  Goal: Not fall by end of shift  Outcome: Progressing     Problem: General Stroke  Goal: Demonstrate improvement in neurological exam throughout the shift  Outcome: Progressing  Goal: Maintain BP within ordered limits throughout shift  Outcome: Progressing

## 2024-11-13 NOTE — PROGRESS NOTES
Speech-Language Pathology    SLP Adult Inpatient  Speech-Language Pathology Treatment     Patient Name: Zulma Rao  MRN: [id number]  Today's Date: 11/13/2024  Time Calculation  Start Time: 1336  Stop Time: 1359  Time Calculation (min): 23 min         Assessment:  -Possible pharyngeal dysphagia s/p acute L MCA- improving.    -Deficits in both expressive and receptive language- improving.  Will require further cognitive assessment.        Recommendations:  NPO; Meds at discretion of medical team; Modified Barium Swallow Study to further assess oropharyngeal swallow and guide diet recommendations      Frequent, aggressive oral care as tolerated to improve infection control     OK for small amounts of ice chips and sips of water (one at a time, 5x/hour) for oral comfort and to prevent swallow disuse atrophy, but only after aggressive oral care to avoid colonization of bacteria within the oral cavity.          Plan:  SLP Plan: Skilled SLP  SLP Frequency: 2x per week  Duration: 2-3 weeks  Discussed POC: Pt, medical team      Goals:  Pt will demonstrate adequate oral phase and initiate effortful swallows with small ice chips x10 reps.              Date initiated: 11/12/24              Expected Time Frame to Meet Goal: 2-3 weeks               Status: Progressing      Pt will participate in ongoing dysphagia assessment including the 3 oz Swallow Protocol with no overt indicators of aspiration on 100% of attempts.               Date initiated: 11/12/24              Expected Time Frame to Meet Goal: 2-3 weeks               Status: Progressing      Pt/family will indicate understanding of dysphagia education: risk for aspiration, recommendations, and POC with > 80% accuracy via teach back method.              Date initiated: 11/12/24              Expected Time Frame to Meet Goal: 2-3 weeks               Status: Progressing     Pt will follow 1-step commands with > 80% accuracy with min cues.                Date initiated:  11/12/24              Expected Time Frame to Meet Goal: 2-3 weeks    Status: Goal Met      Pt will respond to Y/N questions with > 80% accuracy given min cues.                Date initiated: 11/12/24              Expected Time Frame to Meet Goal: 2-3 weeks    Status: Goal Met      Pt will identify an object, its color, and its function with > 80% accuracy given min cues.                Date initiated: 11/12/24              Expected Time Frame to Meet Goal: 2-3 weeks    Status: Goal Met      Pt will express wants and needs to staff and family via total communication.              Date initiated: 11/12/24              Expected Time Frame to Meet Goal: 2-3 weeks    Status: Goal Met    Pt will participate in further cognitive-linguistic assessment.   Date initiated: 11/13/24              Expected Time Frame to Meet Goal: 1-2 sessions         Subjective   RN cleared pt for tx.  Pt properly identified and treated bedside.  Awake and alert, with no c/o pain.  O2 via NC.  No family present.       Pt admitted w/ an acute L MCA CVA; s/p TNK and MT.         Objective   Clinical bedside swallow re-evaluation completed.  Pt presented with small, single ice chips x2.  Adequate bolus manipulation.  No s/s of aspiration.  Pt also participated in trials of single sips of thin liquid via straw x4. Functional oral phase; no ant spillage.  No s/s of aspiration noted.  3 oz Swallow Protocol attempted.  Pt unable to consume successive boluses; ? Difficulty following commands vs difficulty coordinating breathing/swallowing vs silent aspiration.  Recommend MBSS for further assessment.  Pt agreeable to completing.  D/w medical team; order placed for tomorrow AM.    Pt participated in a variety of tasks targeting expressive and receptive language to improve functional communication.  Pt followed 1-step commands, answered Y/N questions, and answered WH questions with > 95% accuracy.  Identified an object, its color, and its function correctly  in 100% of trials.  Independently expressing wants/needs.  Oriented x4 independently.  Will require further cognitive assessment next visit.  No needs voiced at end of session.  Call bell within reach.        11/13/24 at 2:08 PM - Nithya Trotter, SLP

## 2024-11-13 NOTE — CARE PLAN
Problem: Pain - Adult  Goal: Verbalizes/displays adequate comfort level or baseline comfort level  Outcome: Progressing     Problem: Safety - Adult  Goal: Free from fall injury  Outcome: Progressing     Problem: Discharge Planning  Goal: Discharge to home or other facility with appropriate resources  Outcome: Progressing     Problem: Chronic Conditions and Co-morbidities  Goal: Patient's chronic conditions and co-morbidity symptoms are monitored and maintained or improved  Outcome: Progressing     Problem: Fall/Injury  Goal: Not fall by end of shift  Outcome: Progressing  Goal: Be free from injury by end of the shift  Outcome: Progressing  Goal: Verbalize understanding of personal risk factors for fall in the hospital  Outcome: Progressing  Goal: Verbalize understanding of risk factor reduction measures to prevent injury from fall in the home  Outcome: Progressing  Goal: Use assistive devices by end of the shift  Outcome: Progressing  Goal: Pace activities to prevent fatigue by end of the shift  Outcome: Progressing     Problem: Pain  Goal: Takes deep breaths with improved pain control throughout the shift  Outcome: Progressing  Goal: Turns in bed with improved pain control throughout the shift  Outcome: Progressing  Goal: Walks with improved pain control throughout the shift  Outcome: Progressing  Goal: Performs ADL's with improved pain control throughout shift  Outcome: Progressing  Goal: Participates in PT with improved pain control throughout the shift  Outcome: Progressing  Goal: Free from opioid side effects throughout the shift  Outcome: Progressing  Goal: Free from acute confusion related to pain meds throughout the shift  Outcome: Progressing     Problem: General Stroke  Goal: Establish a mutual long term goal with patient by discharge  Outcome: Progressing  Goal: Demonstrate improvement in neurological exam throughout the shift  Outcome: Progressing  Goal: Maintain BP within ordered limits throughout  shift  Outcome: Progressing  Goal: Participate in treatment (ie., meds, therapy) throughout shift  Outcome: Progressing  Goal: No symptoms of aspiration throughout shift  Outcome: Progressing  Goal: No symptoms of hemorrhage throughout shift  Outcome: Progressing  Goal: Tolerate enteral feeding throughout shift  Outcome: Progressing  Goal: Decreased nausea/vomiting throughout shift  Outcome: Progressing  Goal: Controlled blood glucose throughout shift  Outcome: Progressing  Goal: Out of bed three times today  Outcome: Progressing     Problem: ICU Stroke  Goal: Maintain ICP within ordered limits throughout shift  Outcome: Progressing  Goal: Tolerate EVD clamping trial throughout shift  Outcome: Progressing  Goal: Tolerate ventilator weaning trial during shift  Outcome: Progressing  Goal: Maintain patent airway throughout shift  Outcome: Progressing  Goal: Achieve/maintain targeted sodium level throughout shift  Outcome: Progressing     Problem: Skin  Goal: Decreased wound size/increased tissue granulation at next dressing change  Outcome: Progressing  Goal: Participates in plan/prevention/treatment measures  Outcome: Progressing  Goal: Prevent/manage excess moisture  Outcome: Progressing  Goal: Prevent/minimize sheer/friction injuries  Outcome: Progressing  Goal: Promote/optimize nutrition  Outcome: Progressing  Goal: Promote skin healing  Outcome: Progressing

## 2024-11-13 NOTE — PROGRESS NOTES
Zulma Rao is a 79 y.o. female on day 2 of admission presenting with Cerebrovascular accident (CVA) due to occlusion of left middle cerebral artery (Multi).    Social Work Discharge Planning note:    -Patient discussed during interdisciplinary rounds.   -Team members present: Fellow, PT and OT, and SW  -Plan per medical team: Pt is not medically ready for discharge. Pt remains NSU status due to being pressure dependent.   -Payer: [facility] Medicare  -Status: Inpatient  -Discharge disposition: Pt is currently with PT recommendations for Mod intensity and OT recommendations for High intensity. Pt did not appear to be oriented so SW called Pt's daughter, Alecia, to further discuss discharge planning. Alecia reported that she was hoping that Pt would be able go to [facility] again (she was placed there after her last stroke) and she would like  to send [facility] a referral so they can follow. SW electronically sent Alecia a printed list of Skilled facilities via [employer] and she will get back to  with her facility choices. SW will continue to follow to assist with discharge planning.   -Support to Pt/family: Alecia reported that Pt lives with her significant other, Mynor, who will provide up to 24 hour assistance once Pt is ready to return home. Alecia reported no other issues or concerns at this time. SW will continue to follow for emotional/incidental support to Pt/family.    -Potential Barriers: none  -Anticipated Date of Discharge:  11/16/24    Wily Pineda MSW, LSW

## 2024-11-13 NOTE — SIGNIFICANT EVENT
ICU Update:    BP goal was liberated today. Patient was weaned off vasopressor therapy and had stable exam. This afternoon, patient had , was given 1x dose IV hydralazine. Patient SBP improved to ~ 140, however she had exam change with return of aphasia and right sided hemiplegia. Likely pressure-dependent exam 2/2 significant L ICA stenosis.     - Resume Norepi and Miguel gtts --> goal -200  - Q1h neuro checks  - Neuro Stroke notified  - Obtain CT Angio head/neck to determine if emergent L ICA revascularization is needed      Kalen Tejada, APRN-CNP

## 2024-11-14 ENCOUNTER — APPOINTMENT (OUTPATIENT)
Dept: RADIOLOGY | Facility: HOSPITAL | Age: 79
DRG: 038 | End: 2024-11-14
Payer: MEDICARE

## 2024-11-14 ENCOUNTER — APPOINTMENT (OUTPATIENT)
Dept: CARDIOLOGY | Facility: HOSPITAL | Age: 79
DRG: 038 | End: 2024-11-14
Payer: MEDICARE

## 2024-11-14 LAB
ABO GROUP (TYPE) IN BLOOD: NORMAL
ALBUMIN SERPL BCP-MCNC: 3.5 G/DL (ref 3.4–5)
ANION GAP SERPL CALC-SCNC: 15 MMOL/L (ref 10–20)
ANTIBODY SCREEN: NORMAL
APPEARANCE UR: CLEAR
BASOPHILS # BLD AUTO: 0.04 X10*3/UL (ref 0–0.1)
BASOPHILS NFR BLD AUTO: 0.4 %
BILIRUB UR STRIP.AUTO-MCNC: NEGATIVE MG/DL
BUN SERPL-MCNC: 4 MG/DL (ref 6–23)
CA-I BLD-SCNC: 1.01 MMOL/L (ref 1.1–1.33)
CALCIUM SERPL-MCNC: 7 MG/DL (ref 8.6–10.6)
CHLORIDE SERPL-SCNC: 109 MMOL/L (ref 98–107)
CO2 SERPL-SCNC: 21 MMOL/L (ref 21–32)
COLOR UR: COLORLESS
CREAT SERPL-MCNC: 0.32 MG/DL (ref 0.5–1.05)
EGFRCR SERPLBLD CKD-EPI 2021: >90 ML/MIN/1.73M*2
EOSINOPHIL # BLD AUTO: 0.08 X10*3/UL (ref 0–0.4)
EOSINOPHIL NFR BLD AUTO: 0.9 %
ERYTHROCYTE [DISTWIDTH] IN BLOOD BY AUTOMATED COUNT: 13.6 % (ref 11.5–14.5)
GLUCOSE BLD MANUAL STRIP-MCNC: 106 MG/DL (ref 74–99)
GLUCOSE BLD MANUAL STRIP-MCNC: 112 MG/DL (ref 74–99)
GLUCOSE BLD MANUAL STRIP-MCNC: 145 MG/DL (ref 74–99)
GLUCOSE BLD MANUAL STRIP-MCNC: 147 MG/DL (ref 74–99)
GLUCOSE BLD MANUAL STRIP-MCNC: 153 MG/DL (ref 74–99)
GLUCOSE BLD MANUAL STRIP-MCNC: 84 MG/DL (ref 74–99)
GLUCOSE BLD MANUAL STRIP-MCNC: 92 MG/DL (ref 74–99)
GLUCOSE SERPL-MCNC: 137 MG/DL (ref 74–99)
GLUCOSE UR STRIP.AUTO-MCNC: NORMAL MG/DL
HCT VFR BLD AUTO: 28.7 % (ref 36–46)
HGB BLD-MCNC: 9.7 G/DL (ref 12–16)
IMM GRANULOCYTES # BLD AUTO: 0.05 X10*3/UL (ref 0–0.5)
IMM GRANULOCYTES NFR BLD AUTO: 0.5 % (ref 0–0.9)
KETONES UR STRIP.AUTO-MCNC: ABNORMAL MG/DL
LEUKOCYTE ESTERASE UR QL STRIP.AUTO: NEGATIVE
LYMPHOCYTES # BLD AUTO: 1.35 X10*3/UL (ref 0.8–3)
LYMPHOCYTES NFR BLD AUTO: 14.7 %
MAGNESIUM SERPL-MCNC: 1.73 MG/DL (ref 1.6–2.4)
MCH RBC QN AUTO: 29.5 PG (ref 26–34)
MCHC RBC AUTO-ENTMCNC: 33.8 G/DL (ref 32–36)
MCV RBC AUTO: 87 FL (ref 80–100)
MONOCYTES # BLD AUTO: 0.92 X10*3/UL (ref 0.05–0.8)
MONOCYTES NFR BLD AUTO: 10 %
NEUTROPHILS # BLD AUTO: 6.76 X10*3/UL (ref 1.6–5.5)
NEUTROPHILS NFR BLD AUTO: 73.5 %
NITRITE UR QL STRIP.AUTO: NEGATIVE
NRBC BLD-RTO: 0 /100 WBCS (ref 0–0)
PH UR STRIP.AUTO: 5.5 [PH]
PHOSPHATE SERPL-MCNC: 1.9 MG/DL (ref 2.5–4.9)
PLATELET # BLD AUTO: 129 X10*3/UL (ref 150–450)
POTASSIUM SERPL-SCNC: 3.4 MMOL/L (ref 3.5–5.3)
PROT UR STRIP.AUTO-MCNC: NEGATIVE MG/DL
RBC # BLD AUTO: 3.29 X10*6/UL (ref 4–5.2)
RBC # UR STRIP.AUTO: NEGATIVE /UL
RH FACTOR (ANTIGEN D): NORMAL
SODIUM SERPL-SCNC: 142 MMOL/L (ref 136–145)
SP GR UR STRIP.AUTO: 1.01
STAPHYLOCOCCUS SPEC CULT: NORMAL
UROBILINOGEN UR STRIP.AUTO-MCNC: NORMAL MG/DL
WBC # BLD AUTO: 9.2 X10*3/UL (ref 4.4–11.3)

## 2024-11-14 PROCEDURE — 2020000001 HC ICU ROOM DAILY

## 2024-11-14 PROCEDURE — 95716 VEEG EA 12-26HR CONT MNTR: CPT

## 2024-11-14 PROCEDURE — 2500000005 HC RX 250 GENERAL PHARMACY W/O HCPCS: Performed by: STUDENT IN AN ORGANIZED HEALTH CARE EDUCATION/TRAINING PROGRAM

## 2024-11-14 PROCEDURE — 37799 UNLISTED PX VASCULAR SURGERY: CPT

## 2024-11-14 PROCEDURE — 2500000004 HC RX 250 GENERAL PHARMACY W/ HCPCS (ALT 636 FOR OP/ED)

## 2024-11-14 PROCEDURE — 85025 COMPLETE CBC W/AUTO DIFF WBC: CPT

## 2024-11-14 PROCEDURE — 94640 AIRWAY INHALATION TREATMENT: CPT

## 2024-11-14 PROCEDURE — 82330 ASSAY OF CALCIUM: CPT

## 2024-11-14 PROCEDURE — 2500000005 HC RX 250 GENERAL PHARMACY W/O HCPCS: Performed by: NEUROLOGICAL SURGERY

## 2024-11-14 PROCEDURE — 80069 RENAL FUNCTION PANEL: CPT

## 2024-11-14 PROCEDURE — 70551 MRI BRAIN STEM W/O DYE: CPT

## 2024-11-14 PROCEDURE — 71045 X-RAY EXAM CHEST 1 VIEW: CPT | Performed by: RADIOLOGY

## 2024-11-14 PROCEDURE — 99233 SBSQ HOSP IP/OBS HIGH 50: CPT

## 2024-11-14 PROCEDURE — 2500000002 HC RX 250 W HCPCS SELF ADMINISTERED DRUGS (ALT 637 FOR MEDICARE OP, ALT 636 FOR OP/ED)

## 2024-11-14 PROCEDURE — 82947 ASSAY GLUCOSE BLOOD QUANT: CPT

## 2024-11-14 PROCEDURE — 99222 1ST HOSP IP/OBS MODERATE 55: CPT | Performed by: INTERNAL MEDICINE

## 2024-11-14 PROCEDURE — 83735 ASSAY OF MAGNESIUM: CPT

## 2024-11-14 PROCEDURE — 74230 X-RAY XM SWLNG FUNCJ C+: CPT

## 2024-11-14 PROCEDURE — 2500000001 HC RX 250 WO HCPCS SELF ADMINISTERED DRUGS (ALT 637 FOR MEDICARE OP)

## 2024-11-14 PROCEDURE — 70551 MRI BRAIN STEM W/O DYE: CPT | Performed by: RADIOLOGY

## 2024-11-14 PROCEDURE — 95700 EEG CONT REC W/VID EEG TECH: CPT

## 2024-11-14 PROCEDURE — 2500000004 HC RX 250 GENERAL PHARMACY W/ HCPCS (ALT 636 FOR OP/ED): Mod: JZ | Performed by: STUDENT IN AN ORGANIZED HEALTH CARE EDUCATION/TRAINING PROGRAM

## 2024-11-14 PROCEDURE — 71045 X-RAY EXAM CHEST 1 VIEW: CPT

## 2024-11-14 PROCEDURE — 2500000005 HC RX 250 GENERAL PHARMACY W/O HCPCS: Performed by: REGISTERED NURSE

## 2024-11-14 PROCEDURE — 74230 X-RAY XM SWLNG FUNCJ C+: CPT | Performed by: RADIOLOGY

## 2024-11-14 PROCEDURE — 92611 MOTION FLUOROSCOPY/SWALLOW: CPT | Mod: GN

## 2024-11-14 PROCEDURE — 93010 ELECTROCARDIOGRAM REPORT: CPT | Performed by: INTERNAL MEDICINE

## 2024-11-14 PROCEDURE — 86901 BLOOD TYPING SEROLOGIC RH(D): CPT

## 2024-11-14 PROCEDURE — 99291 CRITICAL CARE FIRST HOUR: CPT | Performed by: NEUROLOGICAL SURGERY

## 2024-11-14 PROCEDURE — 81003 URINALYSIS AUTO W/O SCOPE: CPT

## 2024-11-14 PROCEDURE — 93005 ELECTROCARDIOGRAM TRACING: CPT

## 2024-11-14 PROCEDURE — P9045 ALBUMIN (HUMAN), 5%, 250 ML: HCPCS | Mod: JZ | Performed by: STUDENT IN AN ORGANIZED HEALTH CARE EDUCATION/TRAINING PROGRAM

## 2024-11-14 PROCEDURE — 2500000005 HC RX 250 GENERAL PHARMACY W/O HCPCS

## 2024-11-14 PROCEDURE — 92526 ORAL FUNCTION THERAPY: CPT | Mod: GN

## 2024-11-14 PROCEDURE — 95720 EEG PHY/QHP EA INCR W/VEEG: CPT | Performed by: PSYCHIATRY & NEUROLOGY

## 2024-11-14 RX ORDER — ALBUMIN HUMAN 50 G/1000ML
12.5 SOLUTION INTRAVENOUS ONCE
Status: COMPLETED | OUTPATIENT
Start: 2024-11-14 | End: 2024-11-14

## 2024-11-14 RX ORDER — SODIUM CHLORIDE 9 MG/ML
75 INJECTION, SOLUTION INTRAVENOUS CONTINUOUS
Status: ACTIVE | OUTPATIENT
Start: 2024-11-14 | End: 2024-11-15

## 2024-11-14 RX ADMIN — BARIUM SULFATE 20 ML: 400 PASTE ORAL at 12:05

## 2024-11-14 RX ADMIN — SENNOSIDES 17.2 MG: 8.6 TABLET, FILM COATED ORAL at 20:46

## 2024-11-14 RX ADMIN — BARIUM SULFATE 40 ML: 0.81 POWDER, FOR SUSPENSION ORAL at 12:06

## 2024-11-14 RX ADMIN — ACETAMINOPHEN 650 MG: 325 TABLET ORAL at 15:57

## 2024-11-14 RX ADMIN — CLOPIDOGREL BISULFATE 75 MG: 75 TABLET ORAL at 12:11

## 2024-11-14 RX ADMIN — SODIUM CHLORIDE 75 ML/HR: 0.9 INJECTION, SOLUTION INTRAVENOUS at 01:23

## 2024-11-14 RX ADMIN — TIOTROPIUM BROMIDE INHALATION SPRAY 2 PUFF: 3.12 SPRAY, METERED RESPIRATORY (INHALATION) at 12:07

## 2024-11-14 RX ADMIN — HYDROMORPHONE HYDROCHLORIDE 0.2 MG: 1 INJECTION, SOLUTION INTRAMUSCULAR; INTRAVENOUS; SUBCUTANEOUS at 20:04

## 2024-11-14 RX ADMIN — BUSPIRONE HYDROCHLORIDE 10 MG: 10 TABLET ORAL at 20:46

## 2024-11-14 RX ADMIN — BUSPIRONE HYDROCHLORIDE 10 MG: 10 TABLET ORAL at 15:57

## 2024-11-14 RX ADMIN — FLUTICASONE FUROATE AND VILANTEROL TRIFENATATE 1 PUFF: 100; 25 POWDER RESPIRATORY (INHALATION) at 12:07

## 2024-11-14 RX ADMIN — INSULIN LISPRO 1 UNITS: 100 INJECTION, SOLUTION INTRAVENOUS; SUBCUTANEOUS at 12:17

## 2024-11-14 RX ADMIN — ALBUMIN HUMAN 12.5 G: 0.05 INJECTION, SOLUTION INTRAVENOUS at 00:19

## 2024-11-14 RX ADMIN — HYDROMORPHONE HYDROCHLORIDE 0.2 MG: 1 INJECTION, SOLUTION INTRAMUSCULAR; INTRAVENOUS; SUBCUTANEOUS at 02:34

## 2024-11-14 RX ADMIN — LIDOCAINE 1 PATCH: 4 PATCH TOPICAL at 09:01

## 2024-11-14 RX ADMIN — PANTOPRAZOLE SODIUM 40 MG: 40 INJECTION, POWDER, FOR SOLUTION INTRAVENOUS at 06:01

## 2024-11-14 RX ADMIN — HEPARIN SODIUM 5000 UNITS: 5000 INJECTION INTRAVENOUS; SUBCUTANEOUS at 00:04

## 2024-11-14 RX ADMIN — SODIUM CHLORIDE 1000 ML: 9 INJECTION, SOLUTION INTRAVENOUS at 00:19

## 2024-11-14 RX ADMIN — ASPIRIN 81 MG CHEWABLE TABLET 81 MG: 81 TABLET CHEWABLE at 20:46

## 2024-11-14 RX ADMIN — HEPARIN SODIUM 5000 UNITS: 5000 INJECTION INTRAVENOUS; SUBCUTANEOUS at 16:50

## 2024-11-14 RX ADMIN — MAGNESIUM SULFATE HEPTAHYDRATE 2 G: 40 INJECTION, SOLUTION INTRAVENOUS at 05:50

## 2024-11-14 RX ADMIN — HEPARIN SODIUM 5000 UNITS: 5000 INJECTION INTRAVENOUS; SUBCUTANEOUS at 09:02

## 2024-11-14 RX ADMIN — ATORVASTATIN CALCIUM 80 MG: 80 TABLET, FILM COATED ORAL at 20:46

## 2024-11-14 RX ADMIN — VASOPRESSIN 0.03 UNITS/MIN: 0.2 INJECTION INTRAVENOUS at 08:02

## 2024-11-14 RX ADMIN — CALCIUM GLUCONATE 1 G: 20 INJECTION, SOLUTION INTRAVENOUS at 05:50

## 2024-11-14 RX ADMIN — BUSPIRONE HYDROCHLORIDE 10 MG: 10 TABLET ORAL at 12:11

## 2024-11-14 RX ADMIN — POTASSIUM CHLORIDE 20 MEQ: 200 INJECTION, SOLUTION INTRAVENOUS at 05:50

## 2024-11-14 RX ADMIN — MELATONIN 3 MG: 3 TAB ORAL at 20:46

## 2024-11-14 RX ADMIN — POTASSIUM PHOSPHATE, MONOBASIC POTASSIUM PHOSPHATE, DIBASIC 21 MMOL: 224; 236 INJECTION, SOLUTION, CONCENTRATE INTRAVENOUS at 09:01

## 2024-11-14 ASSESSMENT — PAIN SCALES - GENERAL
PAINLEVEL_OUTOF10: 7
PAINLEVEL_OUTOF10: 0 - NO PAIN
PAINLEVEL_OUTOF10: 7
PAINLEVEL_OUTOF10: 0 - NO PAIN
PAINLEVEL_OUTOF10: 4
PAINLEVEL_OUTOF10: 0 - NO PAIN

## 2024-11-14 ASSESSMENT — PAIN DESCRIPTION - DESCRIPTORS
DESCRIPTORS: SORE;TENDER
DESCRIPTORS: DISCOMFORT;DULL;SORE
DESCRIPTORS: SORE;TENDER

## 2024-11-14 ASSESSMENT — PAIN - FUNCTIONAL ASSESSMENT
PAIN_FUNCTIONAL_ASSESSMENT: 0-10
PAIN_FUNCTIONAL_ASSESSMENT: 0-10
PAIN_FUNCTIONAL_ASSESSMENT: CPOT (CRITICAL CARE PAIN OBSERVATION TOOL)
PAIN_FUNCTIONAL_ASSESSMENT: 0-10
PAIN_FUNCTIONAL_ASSESSMENT: 0-10
PAIN_FUNCTIONAL_ASSESSMENT: CPOT (CRITICAL CARE PAIN OBSERVATION TOOL)

## 2024-11-14 NOTE — PROCEDURES
Speech-Language Pathology    Inpatient/Outpatient Modified Barium Swallow Study    Patient Name: Zulma Rao  MRN: [id number]  : 1945  Today's Date: 24  Time Calculation  Start Time: 0948  Stop Time: 1010  Time Calculation (min): 22 min       Modified Barium Swallow Study completed. Informed verbal consent obtained prior to completion of exam. The study was completed with various liquid barium consistencies, pudding, and soft solids.  Deviated from protocol given pt constraints.  The anatomic structures and function of the oropharynx, larynx, hypopharynx and cervical esophagus were evaluated.    SLP: Nithya Trotter, SLP   Contact info: [employer]; phone: [phone]      Reason for Referral: Further assessment of oropharyngeal swallow and to guide diet recommendations   Patient Hx: Pt admitted w/ an acute L MCA CVA; s/p TNK and MT.   Respiratory Status: O2 via NC; 4L  Current diet: NPO s/p bedside evaluation; however no baseline dysphagia     Pain:  Pain Scale: 0-10  Ratin      RECOMMENDATIONS:   Solid Diet Recommendations: Minced & Moist   Liquid Diet Recommendations: Thin   Medications: Crushed in puree as cleared by medical team      Safe Swallow/Compensatory Strategies:  Upright positioning   Slow rate/small boluses   Alternate solids and liquids      SLP PLAN:  Skilled SLP Services: Skilled SLP intervention for dysphagia is warranted.  SLP Frequency: 2x per week  Duration: 2 weeks     Discussed POC: Patient  Discussed Risks/Benefits: Yes  Patient/Caregiver Agreeable: Yes    Short Term Goals Established 24:   - Patient will tolerate baseline/recommended PO diet without overt s/s of aspiration, further difficulty, or concern for aspiration-related complications in 90% of observed therapeutic trials.  - Patient will participate in solid upgrade trials with adequate mastication/oral phase given no residue following swallow.   - Patient/family will indicate understanding of  dysphagia education: risk for aspiration, recommendations, and POC with > 80% accuracy via teach back method.      Long Term Goal Established 11/14/24:   Patient will resume/maintain oral intake in order to promote optimal oropharyngeal swallow function and reduce risk for dehydration, malnutrition and weight loss.     Treatment Provided: SLP provided extensive education re: results and recommendations following MBSS. Discussed anatomy/physiology of swallow function, diet modifications, and the use of compensatory swallow strategies to promote pt safety upon PO intake.  Pt indicated understanding via teach back method with > 70% accuracy.      Mechanics of the Swallow Summary:  Oral Motor Assessment:  Oral Hygiene: WFL  Dentition: Upper and lower dentures    Oral Motor: WFL  Vocal Quality (Perceptually): WFL    ORAL PHASE:  Lip Closure - No labial escape/anterior loss of bolus   Tongue Control During Bolus Hold - Escape to lateral buccal cavity and/or floor of mouth   Bolus prep/mastication - Slow prolonged mastication with complete re-collection necessary   Bolus transport/lingual motion - Repetitive/disorganized tongue motion (tongue pumping)  + slow AP transit   Oral residue - Residue collection on oral structure - cleared w/ re-swallows     PHARYNGEAL PHASE:  Initiation of pharyngeal swallow - Bolus head at vallecular pit   Soft palate elevation - No bolus between soft palate/pharyngeal wall   Laryngeal elevation - Adequate   Anterior hyoid excursion - Adequate   Epiglottic movement - Complete inversion    Laryngeal vestibule closure - Incomplete - narrow column of air/contrast in laryngeal vestibule   Pharyngeal stripping wave - Adequate   Pharyngeal contraction (A/P view) - Not tested       Pharyngoesophageal segment opening - Complete distension and complete duration/no obstruction of flow of bolus   Tongue base retraction - Adequate   Pharyngeal residue - Trace residue within or on the pharyngeal structures      ESOPHAGEAL PHASE:  Esophageal clearance - Not evaluated       SLP Impressions with Severity Rating:   Pt presents with mild/moderate oral dysphagia upon completion of modified barium swallow study this date. Swallowing physiology is detailed above. Impairments most impacting swallowing efficiency include impaired bolus formation/transit, piecemeal swallow with solids and liquids.  Pharyngeal swallow largely intact.  One instance of trace penetration with thin liquids due to mistimed laryngeal vestibule closure; cleared upon swallow completion.      OUTCOME MEASURES:  Functional Oral Intake Scale  Functional Oral Intake Scale: Level 5        total oral diet with multiple consistencies, but requires special preparations and compensations       Rosenbek's Penetration Aspiration Scale  Thin Liquids: 2. PENETRATION that CLEARS - contrast enter airway, above vocal cords, no residue  Puree: 1. NO ASPIRATION & NO PENETRATION - no aspiration, contrast does not enter airway  Soft Solids: 1. NO ASPIRATION & NO PENETRATION - no aspiration, contrast does not enter airway        11/14/24 at 4:30 PM - Nithya Trotter, SLP

## 2024-11-14 NOTE — SIGNIFICANT EVENT
Patient with exam change around 5pm, CTA H/N completed with no LVO, no intervention per neuro angio team.     Current exam at SBP ~150s  Awake, regards, mixed aphasia, nods to orientation question(name and place)  RUE ext  RLE brisk w/d  LUE thumbs up  LLE wiggles toes    Plan  - -200 per stroke  - monitor urine output, maintain euvolemia  - pressors to maintain SPB goal, weaning off amos, uptitrating Levo

## 2024-11-14 NOTE — CONSULTS
Inpatient consult to Cardiology  Consult performed by: Ismael Kumari MD  Consult ordered by: Saurabh Buckner MD        History Of Present Illness:    Zulma Rao is a 79 y.o. female with h/o HFpEF, NSTEMI, T2DM, HTN, HLD, anxiety, hypothyroidism, COPD, previous R sided stroke (7/2024), b/l cataracts, bilateral carotid stenosis s/p R CEA (2011), HCC s/p partial hepatectomy (2019) presenting with CVA s/p TNK   Neurosurg planning for stenotic Left DAKOTA revascularization. Cardiology consulted for pre-op risk stratification.    The patient is currently admitted to NSU with CVA s/p TNK, Brain MRI revealed acute/subacute watershed ischemia throughout the left cerebral, neurosurgery planing for stenotic Left DAKOTA revascularization. The NSU team reports that the patient is requiring pressors (NE and vasopressin) to keep her SBP above 180 because the patient develops aphasia when it drops below that.     Cardiac history:  Type 2 NSTEMI in 2019, at the time the patient was admitted to ICU with acute anemia that underwent CT guided embolectomy of liver mass on 7/25, and underwent further diagnostic laproscopy and partial hepatectomy of segments 5 and 6 on 7/26 with biopsy showing partially necrotic moderately differentiated hepatocellular carcinoma w/ features of embolization. Patient's procedure was complicated for post-op respiratory failure and Type 2 NSTEMI (Her troponin radha to about 13.) with pulmonary edema needing intubation until 7/30. At that point she was transferred from the ICU to the main floor.  She was seen in clinic by Dr. Bustos where no further testing was done as the she did not want a stress test at the time as per the notes. She currently denies chest pain, SOB, palpitations or lightheadedness.     Last Recorded Vitals:  Vitals:    11/14/24 0745 11/14/24 0800 11/14/24 0815 11/14/24 0830   BP:       BP Location:       Patient Position:       Pulse: 90 101 97 95   Resp: 15 22 18 21   Temp:       TempSrc:        SpO2: 94% (!) 88% 93% 93%   Weight:       Height:           Last Labs:  CBC - 11/14/2024:  2:16 AM  9.2 9.7 129    28.7      CMP - 11/14/2024:  2:16 AM  7.0 6.2 53 --- 3.7   1.9 3.5 64 187      PTT - No results in last year.  _   _ _     Troponin I, High Sensitivity ([facility])   Date/Time Value Ref Range Status   11/12/2024 12:45 AM 83 (H) 0 - 34 ng/L Final     BNP   Date/Time Value Ref Range Status   11/11/2024 05:57  (H) 0 - 99 pg/mL Final     Hemoglobin A1C   Date/Time Value Ref Range Status   11/11/2024 05:57 PM 5.8 (H) See comment % Final   09/10/2024 02:15 AM 6.3 (H) <5.7 % Final     Comment:     Normal less than 5.7%  Prediabetes 5.7% to 6.4%  Diabetes 6.5% or higher      --HgbA1C levels may not be accurate in patients who have renal disease, received recent blood transfusions, are anemic, or who have dyshemoglobinemia.     LDL Calculated   Date/Time Value Ref Range Status   11/11/2024 05:57 PM 92 <=99 mg/dL Final     Comment:                                 Near   Borderline      AGE      Desirable  Optimal    High     High     Very High     0-19 Y     0 - 109     ---    110-129   >/= 130     ----    20-24 Y     0 - 119     ---    120-159   >/= 160     ----      >24 Y     0 -  99   100-129  130-159   160-189     >/=190       VLDL   Date/Time Value Ref Range Status   11/11/2024 05:57 PM 31 0 - 40 mg/dL Final      Last I/O:  I/O last 3 completed shifts:  In: 6177.6 (121.4 mL/kg) [I.V.:2317.6 (45.5 mL/kg); Blood:500; IV Piggyback:3360]  Out: 5225 (102.7 mL/kg) [Urine:5225 (2.9 mL/kg/hr)]  Weight: 50.9 kg     Past Cardiology Tests (Last 3 Years):  EKG:  ECG 12 Lead 11/13/2024 (Preliminary)    Echo:  Transthoracic Echo (TTE) Complete 11/12/2024    Ejection Fractions:  EF   Date/Time Value Ref Range Status   11/12/2024 03:04 PM 68 %      Cath:  No results found for this or any previous visit from the past 1095 days.    Stress Test:  No results found for this or any previous visit from the past 1095  days.    Cardiac Imaging:  No results found for this or any previous visit from the past 1095 days.      Past Medical History:  She has a past medical history of Personal history of other diseases of the circulatory system, Personal history of other diseases of the circulatory system, Personal history of other diseases of the nervous system and sense organs, and Personal history of other endocrine, nutritional and metabolic disease.    Past Surgical History:  She has a past surgical history that includes Other surgical history (12/03/2018); Other surgical history (12/03/2018); Other surgical history (12/03/2018); Other surgical history (12/03/2018); and Other surgical history (12/03/2018).      Social History:  She has no history on file for tobacco use, alcohol use, and drug use.    Family History:  No family history on file.     Allergies:  Amoxicillin, Avelox [moxifloxacin], Doxycycline, Sulfa (sulfonamide antibiotics), and Vicodin [hydrocodone-acetaminophen]    Inpatient Medications:  Scheduled medications   Medication Dose Route Frequency    aspirin  81 mg oral Daily    atorvastatin  80 mg oral Nightly    azithromycin  250 mg oral Once per day on Monday Wednesday Friday    busPIRone  10 mg oral TID    clopidogrel  75 mg oral Daily    pantoprazole  40 mg oral Daily before breakfast    Or    esomeprazole  40 mg nasoduodenal tube Daily before breakfast    Or    pantoprazole  40 mg intravenous Daily before breakfast    tiotropium  2 puff inhalation Daily    And    fluticasone furoate-vilanteroL  1 puff inhalation Daily    heparin (porcine)  5,000 Units subcutaneous q8h    insulin lispro  0-5 Units subcutaneous TID AC    levothyroxine  25 mcg oral Daily    lidocaine  1 patch transdermal Daily    melatonin  3 mg oral Nightly    polyethylene glycol  17 g oral Daily    potassium phosphate  21 mmol intravenous Once    sennosides  2 tablet oral BID     PRN medications   Medication    acetaminophen    alteplase    calcium  gluconate    calcium gluconate    dextrose    dextrose    glucagon    glucagon    HYDROmorphone    ipratropium-albuteroL    magnesium sulfate    magnesium sulfate    ondansetron ODT    Or    ondansetron    oxygen    potassium chloride CR    Or    potassium chloride    potassium chloride CR    Or    potassium chloride    potassium chloride     Continuous Medications   Medication Dose Last Rate    norepinephrine  0-0.5 mcg/kg/min 0.1 mcg/kg/min (11/14/24 0715)    phenylephrine  0-9 mcg/kg/min Stopped (11/13/24 2021)    sodium chloride 0.9%  75 mL/hr 75 mL/hr (11/14/24 0123)    vasopressin  0-0.03 Units/min 0.03 Units/min (11/14/24 0802)     Outpatient Medications:  Current Outpatient Medications   Medication Instructions    acetaminophen (TYLENOL 8 HOUR) 650 mg, oral, Every 12 hours PRN, Do not crush, chew, or split.    aspirin 81 mg, oral, Daily    atorvastatin (LIPITOR) 80 mg, oral, Daily    azithromycin (ZITHROMAX) 250 mg, oral, Three times a week ( Monday , Wednesday and Friday )     busPIRone (BUSPAR) 10 mg, oral, 3 times daily    cloNIDine (CATAPRES) 0.1 mg, oral, 2 times daily PRN    clopidogrel (PLAVIX) 75 mg, oral, Daily    ergocalciferol (VITAMIN D-2) 1.25 mg, oral, Weekly    fluticasone-umeclidin-vilanter (Trelegy Ellipta) 100-62.5-25 mcg blister with device 1 puff, inhalation, Daily,   At the same time each day    furosemide (LASIX) 20 mg, oral, Daily    ipratropium-albuteroL (Duo-Neb) 0.5-2.5 mg/3 mL nebulizer solution 3 mL, nebulization, 4 times daily PRN    levothyroxine (SYNTHROID, LEVOXYL) 25 mcg, oral, Daily, Take on an empty stomach at the same time each day, either 30 to 60 minutes prior to breakfast    melatonin 3 mg, oral, Nightly    pantoprazole (PROTONIX) 40 mg, oral, Daily before breakfast, Do not crush, chew, or split.       Physical Exam:  General Appearance:    Alert, cooperative, no distress, appears stated age  Lungs:   Respirations unlabored  Heart:    Regular rate and rhythm, S1 and S2  normal, no murmur, rub  or gallop.  Extremities: Extremities normal, no LL edema        Assessment/Plan   Zulma Rao is a 79 y.o. female with h/o HFpEF, NSTEMI, T2DM, HTN, HLD, anxiety, hypothyroidism, COPD, previous R sided stroke (7/2024), b/l cataracts, bilateral carotid stenosis s/p R CEA (2011), HCC s/p partial hepatectomy (2019) presenting with CVA s/p TNK   Neurosurg planning for stenotic Left DAKOTA revascularization. Cardiology consulted for pre-op risk stratification.    #Pre-op risk stratification  The revised cardiovascular risk index is =3 and is associated with a >15 percent risk of major cardiovascular events. Unable to perform > 4 MET levels of activity given frailty but has no active cardiac symptoms. At this time, the patient is adequately risk optimized from a cardiovascular standpoint. Based on ACC/AHA 2014 donaldo-operative guidelines, there are no cardiac contraindications to planned procedure.   -No further cardiac work up warranted.  - Cardiology will sign off.  PICC - Adult 11/12/24 Double lumen Right Basilic vein (Active)   Site Assessment Clean;Dry;Intact 11/14/24 0400   Proximal Lumen Status Flushed;Blood return noted 11/14/24 0400   Distal Lumen Status Flushed;Blood return noted 11/14/24 0400   Dressing Type Antimicrobial patch;Transparent 11/14/24 0400   Dressing Status Clean;Dry;Occlusive 11/14/24 0400   Number of days: 2       Peripheral IV 11/11/24 20 G Left;Posterior Forearm (Active)   Site Assessment Clean;Dry;Intact 11/14/24 0400   Dressing Type Transparent 11/14/24 0400   Line Status Flushed 11/14/24 0400   Dressing Status Clean;Dry;Occlusive 11/14/24 0400   Number of days: 3       Peripheral IV 11/12/24 20 G Left;Anterior Forearm (Active)   Site Assessment Clean;Dry;Intact 11/14/24 0400   Dressing Type Transparent 11/14/24 0400   Line Status Flushed 11/14/24 0400   Dressing Status Clean;Dry;Occlusive 11/14/24 0400   Number of days: 2       Arterial Line 11/11/24 Left Radial  (Active)   Site Assessment Clean;Dry;Intact 11/14/24 0400   Line Status Pulsatile blood flow 11/14/24 0400   Art Line Waveform Appropriate 11/14/24 0400   Color/Movement/Sensation Capillary refill less than 3 sec;Distal pulses palpable 11/14/24 0400   Dressing Type Transparent 11/14/24 0400   Dressing Status Clean;Dry;Occlusive 11/14/24 0400   Number of days: 3       Urethral Catheter (Active)   Output (mL) 140 mL 11/14/24 0900   Number of days: 2       Code Status:  Full Code    I spent 30 minutes in the professional and overall care of this patient.        Ismael Kumari MD  Cardiology Fellow PGY5    Thank you for involving us in this patient care. Recommendations not final until signed by attending.     General Cardiology Consult Pager: [phone] (weekday 7AM-6PM and weekend 7AM-2PM) and other: [phone]  EP Consult Pager: [phone] (weekday 7AM-6PM and weekend 7AM-2PM) and other: [phone]  CICU Fellow Pager: [phone] anytime  EP Device Nurse Pager: [phone] (weekday 7AM-4PM)  Advanced Heart Failure Consult Pager: [phone] anytime

## 2024-11-14 NOTE — PROGRESS NOTES
"Zulma Rao is a 79 y.o. female on day 3 of admission presenting with Cerebrovascular accident (CVA) due to occlusion of left middle cerebral artery (Multi).    Subjective   Had pressure dependent exam. CTH/CTA obtained. SBP goal adjusted by stroke team.     Objective     Physical Exam  EOV  Ox1 w choice  RUE ext  RLE wd  LUE FC AG  LLE FC AG    Last Recorded Vitals  Blood pressure 179/79, pulse 95, temperature 36.1 °C (97 °F), temperature source Temporal, resp. rate 21, height 1.626 m (5' 4\"), weight 50.9 kg (112 lb 3.4 oz), SpO2 93%.  Intake/Output last 3 Shifts:  I/O last 3 completed shifts:  In: 6177.6 (121.4 mL/kg) [I.V.:2317.6 (45.5 mL/kg); Blood:500; IV Piggyback:3360]  Out: 5225 (102.7 mL/kg) [Urine:5225 (2.9 mL/kg/hr)]  Weight: 50.9 kg     Relevant Results  Lab Results   Component Value Date    WBC 9.2 11/14/2024    HGB 9.7 (L) 11/14/2024    HCT 28.7 (L) 11/14/2024    MCV 87 11/14/2024     (L) 11/14/2024       Assessment/Plan   Principal Problem:    Cerebrovascular accident (CVA) due to occlusion of left middle cerebral artery (Multi)    Zulma Roa is an 79 year old with h/o HFpEF, NSTEMI, T2DM, HTN, HLD, anxiety, hypothyroidism, COPD, previous R sided stroke (7/2024), b/l cataracts, bilateral carotid stenosis s/p R CEA (2011), HCC s/p partial hepatectomy (2019), p/w CVA 2/2 L M1 occlusion, s/p TNK, s/p MT w TICI 3, MRI L watershed infarcts, small infarcts involving left frontal, parietal, and temporal lobes, CTA 74% L carotid artery occlusion proximal to bifurcation, moderate R V2 narrowing, R carotid occlusion at origin, CTA stable      Stroke primary  OR planning for revascularization  EEG for stroke  Please obtain MRI brain to assess stroke burden  Please consult cardiology for OR clearance give fluctuations in BP that occur during OR and any optimziation that may need to occur prior to OR               Winston Nunez MD     "

## 2024-11-14 NOTE — CARE PLAN
Problem: Pain - Adult  Goal: Verbalizes/displays adequate comfort level or baseline comfort level  Outcome: Progressing     Problem: Safety - Adult  Goal: Free from fall injury  Outcome: Progressing     Problem: Discharge Planning  Goal: Discharge to home or other facility with appropriate resources  Outcome: Progressing     Problem: Chronic Conditions and Co-morbidities  Goal: Patient's chronic conditions and co-morbidity symptoms are monitored and maintained or improved  Outcome: Progressing     Problem: Fall/Injury  Goal: Not fall by end of shift  Outcome: Progressing  Goal: Be free from injury by end of the shift  Outcome: Progressing  Goal: Verbalize understanding of personal risk factors for fall in the hospital  Outcome: Progressing  Goal: Verbalize understanding of risk factor reduction measures to prevent injury from fall in the home  Outcome: Progressing  Goal: Use assistive devices by end of the shift  Outcome: Progressing  Goal: Pace activities to prevent fatigue by end of the shift  Outcome: Progressing     Problem: Pain  Goal: Takes deep breaths with improved pain control throughout the shift  Outcome: Progressing  Goal: Turns in bed with improved pain control throughout the shift  Outcome: Progressing  Goal: Walks with improved pain control throughout the shift  Outcome: Progressing  Goal: Performs ADL's with improved pain control throughout shift  Outcome: Progressing  Goal: Participates in PT with improved pain control throughout the shift  Outcome: Progressing  Goal: Free from opioid side effects throughout the shift  Outcome: Progressing  Goal: Free from acute confusion related to pain meds throughout the shift  Outcome: Progressing     Problem: General Stroke  Goal: Establish a mutual long term goal with patient by discharge  Outcome: Progressing  Goal: Demonstrate improvement in neurological exam throughout the shift  Outcome: Progressing  Goal: Maintain BP within ordered limits throughout  shift  Outcome: Progressing  Goal: Participate in treatment (ie., meds, therapy) throughout shift  Outcome: Progressing  Goal: No symptoms of aspiration throughout shift  Outcome: Progressing  Goal: No symptoms of hemorrhage throughout shift  Outcome: Progressing  Goal: Tolerate enteral feeding throughout shift  Outcome: Progressing  Goal: Decreased nausea/vomiting throughout shift  Outcome: Progressing  Goal: Controlled blood glucose throughout shift  Outcome: Progressing  Goal: Out of bed three times today  Outcome: Progressing     Problem: ICU Stroke  Goal: Maintain ICP within ordered limits throughout shift  Outcome: Progressing  Goal: Tolerate EVD clamping trial throughout shift  Outcome: Progressing  Goal: Tolerate ventilator weaning trial during shift  Outcome: Progressing  Goal: Maintain patent airway throughout shift  Outcome: Progressing  Goal: Achieve/maintain targeted sodium level throughout shift  Outcome: Progressing     Problem: Skin  Goal: Decreased wound size/increased tissue granulation at next dressing change  Outcome: Progressing  Goal: Participates in plan/prevention/treatment measures  Outcome: Progressing  Goal: Prevent/manage excess moisture  Outcome: Progressing  Goal: Prevent/minimize sheer/friction injuries  Outcome: Progressing  Goal: Promote/optimize nutrition  Outcome: Progressing  Goal: Promote skin healing  Outcome: Progressing   The patient's goals for the shift include  maintain the patients blood pressure.

## 2024-11-14 NOTE — SIGNIFICANT EVENT
"Preliminary EEG Report     This vEEG is consistent with a left hemispheric structural lesion. No epileptiform discharges seen.     This EEG was read from 2206 to 2230 on 11/13/24 .     The final impression will be available tomorrow under Chart Review in the Media tab. If the plan is to discontinue the video EEG, please place a \"Discontinue Continuous VEEG\" order in the EMR; otherwise the EEG tech will not receive the notification.      Hoang Fletcher DO  Adult Epilepsy Fellow  [facility] [place]     "

## 2024-11-14 NOTE — PROGRESS NOTES
"Zulma Rao is a 79 y.o. female on day 3 of admission presenting with Cerebrovascular accident (CVA) due to occlusion of left middle cerebral artery (Multi).    Subjective   Yesterday afternoon after BP goal liberated 160-180, weaned off of vasopressor therapy, afternoon at unspecified time SBP was noted at 230, pt given 1x dose IV hydralazine; BP was down to 134/43 @ 1600, with R sided hemiplegia and aphasia at that time. By 1900, SBPs to ~150, with NIHSS 10. CTA h/n w & w/o contrast demonstrated no mass effect or hemorrhagic transformation or new intracranial occlusion, but interval increased CT conspicuity of evolving acute infarcts within the deep white matter of the left frontal and parietal lobes corresponding to findings on recent MRI examination. L MCA patent, with unchanged occlusions of R common, internal carotid arteries. Pressor therapy was re-initiated ovn with return to goal of 180-200. OVN vEEG [id number] unremarkable for epileptiform changes, consistent with L hemispheric structural lesion. Electroytes (K, Ca, Mg, fluids repleted overnight.      Neurosurgery consulted yesterday for question of interventional L ICA revascularization: will discuss stent vs CEA with team, and requesting cardiology consult for OR clearance/optimization.      This morning, patient on 3L NC. Endorsing pain in R upper extremity, unable to specify location. Notes desatting to high 80s when agitated       Objective   Visit Vitals  /50   Pulse 90   Temp 36.1 °C (97 °F) (Temporal)   Resp 18   Ht 1.626 m (5' 4\")   Wt 50.9 kg (112 lb 3.4 oz)   SpO2 96%   BMI 19.26 kg/m²   OB Status Menopausal   BSA 1.52 m²   ** 11/13/24 0700 /64 per Arterial Line.  Overnight BP range 174-207 / 57-77  - Trough 174/57 at 0645 since 0000  - Last documented NIHSS 11/12 1900 score of 11       Physical Exam  Vitals reviewed.   HENT:      Head: Normocephalic and atraumatic.      Mouth/Throat:      Mouth: Mucous membranes are moist. "   Eyes:      Extraocular Movements: Extraocular movements intact.   Cardiovascular:      Rate and Rhythm: Normal rate and regular rhythm.      Heart sounds: No murmur heard.  Pulmonary:      Effort: Pulmonary effort is normal.      Breath sounds: Normal breath sounds. No wheezing.   Neurological:      Mental Status: She is alert.       Neurological Exam  Mental Status  Alert. Oriented to person, place, time and situation. Expressive aphasia present. Able to name objects.    Cranial Nerves  CN II: Visual fields intact bilaterally in all quadrants to threat - closes eyes to threat on exam.  CN III, IV, VI: Extraocular movements intact bilaterally.   Right pupil: Round. Reactive to light. Reactive to accommodation.   Left pupil: Round. Reactive to light. Reactive to accommodation.  CN V: Facial sensation is normal.  CN VII: Full and symmetric facial movement.  CN VIII: Hearing grossly intact to conversation.  CN IX, X: Palate elevates symmetrically  CN XI:  Right: Sternocleidomastoid strength is normal. Trapezius strength is weak.  Left: Sternocleidomastoid strength is normal. Trapezius strength is normal.  CN XII: Tongue midline without atrophy or fasciculations.    Motor   No fasciculations present.  Moves all extremities, strength tested to shoulder/hip extension during NIHSS exam. Strength appears somewhat pain limited.  LUE: 4/5   LLE: 4/5  RUE strong  but unable to lift antigravity, RLE no effort against gravity,.    Sensory  Sensory  Extremity sensation grossly intact and symmetric to light touch bilaterally. Patient endorses difference in sensation of light touch R vs L dorsal hand but was unable to articulate if this was a difference of quality or degree of sensation.      .    Coordination  Left: Finger-to-nose normal.  R finger to nose limited by pain, unable to assess.      NIHSS    1a  Level of consciousness: 0=alert; keenly responsive   1b. LOC questions:  0=Performs both tasks correctly   1c. LOC  commands: 0=Performs both tasks correctly   2.  Best Gaze: 1=partial gaze palsy   3. Visual: 0=No visual loss   4. Facial Palsy: 0=Normal symmetric movement   5a. Motor Left Arm: 0=No drift, limb holds 90 (or 45) degrees for full 10 seconds   5b.  Motor Right Arm: 2=Some effort against gravity, limb cannot get to or maintain (if cured) 90 (or 45) degrees, drifts down to bed, but has some effort against gravity   6a. Motor Left Le=No drift, limb holds 90 (or 45) degrees for full 10 seconds   6b  Motor Right Le=Some effort against gravity, limb cannot get to or maintain (if cured) 90 (or 45) degrees, drifts down to bed, but has some effort against gravity   7. Limb Ataxia: 0=Absent   8.  Sensory: 0=Normal; no sensory loss   9. Best Language:  1=Mild to moderate aphasia; some obvious loss of fluency or facility of comprehension without significant limitation on ideas expressed or form of expression.   10. Dysarthria: 1=Mild to moderate, patient slurs at least some words and at worst, can be understood with some difficulty   11. Extinction and Inattention: 0=No abnormality          Total:   7       Relevant Results    MEDICATIONS:  Scheduled medications  aspirin, 81 mg, oral, Daily  atorvastatin, 80 mg, oral, Nightly  azithromycin, 250 mg, oral, Once per day on   busPIRone, 10 mg, oral, TID  clopidogrel, 75 mg, oral, Daily  pantoprazole, 40 mg, oral, Daily before breakfast   Or  esomeprazole, 40 mg, nasoduodenal tube, Daily before breakfast   Or  pantoprazole, 40 mg, intravenous, Daily before breakfast  tiotropium, 2 puff, inhalation, Daily   And  fluticasone furoate-vilanteroL, 1 puff, inhalation, Daily  heparin (porcine), 5,000 Units, subcutaneous, q8h  insulin lispro, 0-5 Units, subcutaneous, TID AC  levothyroxine, 25 mcg, oral, Daily  lidocaine, 1 patch, transdermal, Daily  melatonin, 3 mg, oral, Nightly  polyethylene glycol, 17 g, oral, Daily  potassium phosphate, 21 mmol,  intravenous, Once  sennosides, 2 tablet, oral, BID      Continuous medications  norepinephrine, 0-0.5 mcg/kg/min, Last Rate: 0.07 mcg/kg/min (11/14/24 1245)  phenylephrine, 0-9 mcg/kg/min, Last Rate: Stopped (11/13/24 2021)  sodium chloride 0.9%, 75 mL/hr, Last Rate: 75 mL/hr (11/14/24 0123)  vasopressin, 0-0.03 Units/min, Last Rate: Stopped (11/14/24 1045)      PRN medications  PRN medications: acetaminophen, alteplase, calcium gluconate, calcium gluconate, dextrose, dextrose, glucagon, glucagon, HYDROmorphone, ipratropium-albuteroL, magnesium sulfate, magnesium sulfate, ondansetron ODT **OR** ondansetron, oxygen, potassium chloride CR **OR** potassium chloride, potassium chloride CR **OR** potassium chloride, potassium chloride     LABS:  Results for orders placed or performed during the hospital encounter of 11/11/24 (from the past 24 hours)   POCT GLUCOSE   Result Value Ref Range    POCT Glucose 141 (H) 74 - 99 mg/dL   ECG 12 Lead   Result Value Ref Range    Ventricular Rate 137 BPM    Atrial Rate 283 BPM    QRS Duration 68 ms    QT Interval 320 ms    QTC Calculation(Bazett) 483 ms    R Axis 29 degrees    T Axis -37 degrees    QRS Count 22 beats    Q Onset 221 ms    T Offset 381 ms    QTC Fredericia 421 ms   POCT GLUCOSE   Result Value Ref Range    POCT Glucose 125 (H) 74 - 99 mg/dL   Magnesium   Result Value Ref Range    Magnesium 1.73 1.60 - 2.40 mg/dL   Renal Function Panel   Result Value Ref Range    Glucose 137 (H) 74 - 99 mg/dL    Sodium 142 136 - 145 mmol/L    Potassium 3.4 (L) 3.5 - 5.3 mmol/L    Chloride 109 (H) 98 - 107 mmol/L    Bicarbonate 21 21 - 32 mmol/L    Anion Gap 15 10 - 20 mmol/L    Urea Nitrogen 4 (L) 6 - 23 mg/dL    Creatinine 0.32 (L) 0.50 - 1.05 mg/dL    eGFR >90 >60 mL/min/1.73m*2    Calcium 7.0 (L) 8.6 - 10.6 mg/dL    Phosphorus 1.9 (L) 2.5 - 4.9 mg/dL    Albumin 3.5 3.4 - 5.0 g/dL   CBC and Auto Differential   Result Value Ref Range    WBC 9.2 4.4 - 11.3 x10*3/uL    nRBC 0.0 0.0 - 0.0  /100 WBCs    RBC 3.29 (L) 4.00 - 5.20 x10*6/uL    Hemoglobin 9.7 (L) 12.0 - 16.0 g/dL    Hematocrit 28.7 (L) 36.0 - 46.0 %    MCV 87 80 - 100 fL    MCH 29.5 26.0 - 34.0 pg    MCHC 33.8 32.0 - 36.0 g/dL    RDW 13.6 11.5 - 14.5 %    Platelets 129 (L) 150 - 450 x10*3/uL    Neutrophils % 73.5 40.0 - 80.0 %    Immature Granulocytes %, Automated 0.5 0.0 - 0.9 %    Lymphocytes % 14.7 13.0 - 44.0 %    Monocytes % 10.0 2.0 - 10.0 %    Eosinophils % 0.9 0.0 - 6.0 %    Basophils % 0.4 0.0 - 2.0 %    Neutrophils Absolute 6.76 (H) 1.60 - 5.50 x10*3/uL    Immature Granulocytes Absolute, Automated 0.05 0.00 - 0.50 x10*3/uL    Lymphocytes Absolute 1.35 0.80 - 3.00 x10*3/uL    Monocytes Absolute 0.92 (H) 0.05 - 0.80 x10*3/uL    Eosinophils Absolute 0.08 0.00 - 0.40 x10*3/uL    Basophils Absolute 0.04 0.00 - 0.10 x10*3/uL   Calcium, ionized   Result Value Ref Range    POCT Calcium, Ionized 1.01 (L) 1.1 - 1.33 mmol/L   POCT GLUCOSE   Result Value Ref Range    POCT Glucose 145 (H) 74 - 99 mg/dL   POCT GLUCOSE   Result Value Ref Range    POCT Glucose 147 (H) 74 - 99 mg/dL   ECG 12 Lead   Result Value Ref Range    Ventricular Rate 94 BPM    Atrial Rate 94 BPM    ND Interval 144 ms    QRS Duration 78 ms    QT Interval 390 ms    QTC Calculation(Bazett) 487 ms    P Axis 86 degrees    R Axis 41 degrees    T Axis 31 degrees    QRS Count 15 beats    Q Onset 218 ms    P Onset 146 ms    P Offset 202 ms    T Offset 413 ms    QTC Fredericia 453 ms   Type and screen   Result Value Ref Range    ABO TYPE A     Rh TYPE POS     ANTIBODY SCREEN NEG    Urinalysis with Reflex Microscopic   Result Value Ref Range    Color, Urine Colorless (N) Light-Yellow, Yellow, Dark-Yellow    Appearance, Urine Clear Clear    Specific Gravity, Urine 1.014 1.005 - 1.035    pH, Urine 5.5 5.0, 5.5, 6.0, 6.5, 7.0, 7.5, 8.0    Protein, Urine NEGATIVE NEGATIVE, 10 (TRACE), 20 (TRACE) mg/dL    Glucose, Urine Normal Normal mg/dL    Blood, Urine NEGATIVE NEGATIVE    Ketones,  Urine 80 (3+) (A) NEGATIVE mg/dL    Bilirubin, Urine NEGATIVE NEGATIVE    Urobilinogen, Urine Normal Normal mg/dL    Nitrite, Urine NEGATIVE NEGATIVE    Leukocyte Esterase, Urine NEGATIVE NEGATIVE   POCT GLUCOSE   Result Value Ref Range    POCT Glucose 153 (H) 74 - 99 mg/dL     IMAGING:  MR brain wo IV contrast  Result Date: 11/13/2024 1:26 am     FINDINGS:   CSF Spaces: The ventricles, sulci and basal cisterns are within normal limits for patient's age.     Parenchyma: Watershed distribution areas of T2/FLAIR hyperintense signal with associated diffusion restriction throughout the left cerebral hemisphere most pronounced in the superior left frontal lobe but also involving the parietal and medial temporal lobes. Finding is consistent with acute/subacute watershed infarction.   Cortical foci of diffusion restriction in the left frontal lobe cortex along the interhemispheric aspect, in the left sylvian fissure and posterior left insula and in the left posterior temporal lobe. These findings are consistent with non watershed distribution   2.5 cm focus of encephalomalacia within the right occipital lobe consistent with previous infarction in the distribution of the right posterior cerebral artery. Focus of subacute/chronic lacunar infarction in the right thalamus measuring a proximally 5 mm x 15 mm in size. Focus of susceptibility artifact within the left basal ganglia favored to correspond to mineralization seen on prior CT. Nonspecific patchy T2/FLAIR hyperintense signal within the periventricular and subcortical white matter, likely sequela of chronic microangiopathy.   There is no mass effect or midline shift.       Paranasal Sinuses and Mastoids: Visualized paranasal sinuses and mastoid air cells are unremarkable.   Note is made of postsurgical changes from bilateral lens replacements.       Impression  1. Acute/subacute watershed ischemia throughout the left cerebral hemisphere   2. Subcentimeter cortical  infarctions involving the left superior frontal lobe but also involving the left parietal and temporal lobes. No evidence of hemorrhagic transformation.   3. Nonspecific white matter changes which can be seen in the setting of chronic microangiopathy.       Transthoracic Echo (TTE) Complete  11/12/2024    PHYSICIAN INTERPRETATION:   Left Ventricle: Left ventricular ejection fraction is normal, by visual estimate at 65-70%. There are no regional left ventricular wall motion abnormalities. The left ventricular cavity size is decreased. There is mildly increased septal and mildly increased posterior left ventricular wall thickness. There is left ventricular concentric remodeling. Spectral Doppler shows an abnormal pattern of left ventricular diastolic filling. There is no definite left ventricular thrombus visualized.   Left Atrium: The left atrium is severely dilated. A bubble study using agitated saline was performed. Bubble study is negative. Agitated saline contrast study was negative for intracardiac shunt. Right Ventricle: The right ventricle is normal in size. There is normal right ventricular global systolic function.   Right Atrium: The right atrium is normal in size.   Aortic Valve: The aortic valve is trileaflet. There is no evidence of aortic valve regurgitation. The peak instantaneous gradient of the aortic valve is 8 mmHg.   Mitral Valve: The mitral valve is normal in structure. The peak instantaneous gradient of the mitral valve is 12 mmHg. There is trace to mild mitral valve regurgitation.   Tricuspid Valve: The tricuspid valve is structurally normal. There is trace tricuspid regurgitation. The right ventricular systolic pressure is unable to be estimated.   Pulmonic Valve: The pulmonic valve is structurally normal. There is physiologic pulmonic valve regurgitation.   Pericardium: Trivial pericardial effusion.   Aorta: The aortic root is normal. The aortic root appears normal in size and measures 2.90  cm.  Systemic Veins: The inferior vena cava appears normal in size, with IVC inspiratory collapse greater than 50%. In comparison to the previous echocardiogram(s): There are no prior studies on this patient for comparison purposes. No prior echocardiogram available for comparison.    CONCLUSIONS:    1. Left ventricular ejection fraction is normal, by visual estimate at 65-70%.    2. Spectral Doppler shows an abnormal pattern of left ventricular diastolic filling.    3. Left ventricular cavity size is decreased.    4. No left ventricular thrombus visualized.    5. There is normal right ventricular global systolic function.    6. The left atrium is severely dilated.    7. Agitated saline contrast study was negative for intracardiac shunt.    8. Normal aortic root.    9. No prior echocardiogram available for comparison.     CT ANGIO AORTA AND BILATERAL ILIOFEMORAL RUN OFF INCLUDING WITHOUT  CONTRAST 11/12/2024  IMPRESSION:  1. Extensive vascular disease including occlusion of the infrarenal  abdominal aorta, bilateral common iliac arteries. Additionally, no  appreciated flow is noted in the left posterior tibial artery  distally. Please refer to the detailed description above.  2. Other nonspecific finding as detailed above.    CT head wo IV contrast; CT angio head and neck w and wo IV contrast  11/12/2024 5:34 am     FINDINGS: NON-CONTRAST HEAD CT:     BRAIN PARENCHYMA:  No evidence of acute intraparenchymal hemorrhage or parenchymal evidence of an acute large territory ischemic infarct. No mass-effect, midline shift or effacement of cerebral sulci. Gray-white matter distinction is preserved. Punctate calcifications within the left-greater-than-right basal ganglia. Lacunar within the posterior limb of the right internal capsule and left basal ganglia. Small amount of encephalomalacia and gliosis within the right occipital lobe.     VENTRICLES and EXTRA-AXIAL SPACES:  No acute extra-axial or intraventricular  hemorrhage. Ventricles and sulci are age-concordant.     PARANASAL SINUSES/MASTOIDS:  No hemorrhage or air-fluid levels within the visualized paranasal sinuses. The mastoids are well aerated.     CALVARIUM/ORBITS:  No skull fracture.  The orbits and globes are intact to the extent visualized.     EXTRACRANIAL SOFT TISSUES: No discernible abnormality.         CTA NECK:   There is calcified and noncalcified plaque along the aortic arch and origins of the great vessels. There is marked narrowing at the origin of the right common carotid artery and right subclavian artery. There is moderate narrowing secondary to noncalcified plaque at the origin of the left subclavian artery.     LEFT VERTEBRAL ARTERY:  No hemodynamically significant stenosis, occlusion, or dissection. The left vertebral artery is dominant.     LEFT COMMON/INTERNAL CAROTID ARTERY:  There is calcified plaque along the course of the common carotid artery with mild-to-moderate narrowing. There is coarse calcification at the carotid bifurcation and along the proximal cervical internal carotid artery with a proximally 65% stenosis by NASCET criteria. The cervical internal carotid artery is patent to the level of the skull base without additional stenosis.     RIGHT VERTEBRAL ARTERY: There is coarse atherosclerotic calcification at the origin of the right vertebral artery. No hemodynamically significant stenosis, occlusion, or dissection.     RIGHT COMMON/INTERNAL CAROTID ARTERY:  There is occlusion of the right common carotid artery just beyond the origin. There is occlusion of the right cervical internal carotid artery.   Limited images through the lung apices demonstrate emphysematous changes. Mild degenerative changes of the cervical spine without evidence of high-grade spinal canal stenosis. Soft tissues of the neck are unremarkable.            CTA HEAD:     ANTERIOR CIRCULATION: No aneurysm.    - Internal Carotid Arteries: There is occlusion of the  right petrous and cavernous internal carotid artery. There is reconstitution within the supraclinoid segment. The left intracranial carotid artery is patent. There is atherosclerotic calcification along the cavernous and supraclinoid ICA with mild narrowing.     - Middle Cerebral Arteries: No hemodynamically significant stenosis or occlusion.     - Anterior Cerebral Arteries: There is and abrupt cutoff of the distal left anterior cerebral artery (series 504, image 82). The right [place] is without hemodynamically significant stenosis or occlusion.       POSTERIOR CIRCULATION: No aneurysm.     - Intracranial Vertebral Arteries:  No hemodynamically significant stenosis or occlusion.     - Basilar Artery:  No hemodynamically significant stenosis or occlusion.     - Posterior Cerebral Arteries:  No hemodynamically significant stenosis or occlusion.        CTA NECK:     Complete occlusion of the right common carotid artery and right cervical internal carotid artery.   Coarse atherosclerotic calcification at the left carotid bifurcation with approximately 65% stenosis by NASCET criteria.   The vertebral arteries are patent. The left vertebral artery is dominant. There is a coarse calcification at the origin of the right vertebral artery.   Calcified and noncalcified plaque along the aortic arch and origins of the great vessels with narrowing.     CTA head:   There is an abrupt cutoff of the distal left anterior cerebral artery.   Occlusion of the right petrous and cavernous carotid arteries with reconstitution in the supraclinoid segment.   The left intracranial carotid artery is patent with coarse atherosclerotic calcification and mild narrowing.   The posterior circulation is patent without significant stenosis.        CT HEAD:   No acute intracranial hemorrhage or mass effect.     ---     CT head wo IV contrast 11/11/2024 9:03 pm     FINDINGS:   CSF Spaces: Ventricles, cortical sulci and basal cisterns are prominent  reflecting age related involutional changes and mild volume loss. Mildly prominent extra-axial CSF space within the bifrontal region may represent asymmetric volume loss. There is no extraaxial fluid collection.     Parenchyma: There is no acute intraparenchymal hemorrhage or parenchymal evidence of acute large territorial ischemic infarction. Patchy white matter hypodensity likely represents microangiopathy. Lucency within the right basal ganglia likely represents a lacunar infarct. Small area of encephalomalacia and gliosis within the right occipital lobe. Of note multiple intracranial vessels are hyperdense, likely secondary to recent IR neuro angiogram. The grey-white differentiation is intact. There is no mass effect or midline shift.     Calvarium: The calvarium is unremarkable.     Paranasal sinuses and mastoids: Visualized paranasal sinuses and mastoids are clear.        No acute intracranial hemorrhage or mass effect.   Small focal area of encephalomalacia and gliosis within the right occipital lobe.   Mild white matter changes compatible with microangiopathy. Lacunar infarct within the right basal ganglia.              Assessment/Plan   This patient currently has cardiac telemetry ordered; if you would like to modify or discontinue the telemetry order, click here to go to the orders activity to modify/discontinue the order.  Assessment & Plan  Cerebrovascular accident (CVA) due to occlusion of left middle cerebral artery (Multi)    Ms. Rao is an 80 yo female with history of stroke in 7/2024 HTN, HLD, R CEA 2011, HFpEF, COPD, anxiety, partial hepatectomy for HCC 2019, mRS 0,  transferred from OSH for acute left MCA infarct with NIH 21, s/p TNK. MT performed, but patient's left had already recanalized with TNK (therefore cannot use Tici Scoring). Thrombectomy complicated by severe diffuse atherosclerosis of vasculature, requiring multiple entry attempts/passes. Access gained via right radial artery. NIH  20 s/p thrombectomy with RUE/RLE antigravity, improved from prior. Admitting to [facility] for continued monitoring. Spontaneous recanalization s/p TNK, Notable NIHSS decrease from initial score of 21 demonstrates marked improvement from time of presentation to [facility]. Initial SBP goal <180, however the patient has had BP dependant examinations, reportedly revealing deterioration below -180 by several [facility] clinicians 11/11-12. SBP goals have been set at 180-200, maintained with vasopressors; plan to decrease to 170-180 as tolerated today.  Given permissive HTN and increase in SBP goals with use of vasopressors, ctm for intracranial hemorrhage, remain NSU status. Patient's  clarified she has been on DAPT since 07/2024, rec continuation of aspirin and initiation of clopidogrel based on MRI Head w/o contrast findings. Given the approximately 65% stenosis noted proximal to the left carotid bifurcation, plan for potential L carotid artery stenting within 2 weeks iso complete occlusion of the right common carotid artery and right cervical internal carotid artery, will plan to consult neurosurgery.      Type: Ischemic stroke  Subtype/etiology: Artery to artery  Vessels involved: left MCA  Neurological manifestations:  NIHSS (worst at presentation): 21   Antiplatelet/antithrombotic plan for stroke prevention: Aspirin, NEW recommendation to start clopidogrel  VTE prophylaxis: SCDs, Heparin 5000 units Q8hr     Vascular Risk Factor modification goals:  Blood pressure goals: avoid hypotension SBP <100 that could worsen cerebral perfusion, Ischemic stroke post-thrombectomy   Lipid Goals: education on healthy diet and statin therapy to maintain or achieve goal LDL-cholesterol < 70mg  Glucose Goals: early treatment of hyperglycemia to goal glucose 140-180 mg/dl with long-term goal A1c < 7%   Smoking Cessation and Education  Assessment for Rehabilitation needs   Patient and family education on signs and symptoms of stroke,  calling 911, healthy strategies for stroke prevention.         # Acute Left MCA infarct s/p TNK (11/11 @ 1255) and MT (already recanalized- no Tici scoring)  : : LDL 92; A1c 5.8%  : : NIHSS 21 at time of transfer to [facility] ED--> 6 this morning.     : : MRI brain w/o contrast with L cerebral hemisphere acute-subacute ischemia; subcentimeter cortical infarcts of L superior frontal, parietal, annd temporal lobes.   : : CTA with atherosclerotic calcification at the left carotid bifurcation with approximately 65% stenosis by NASCET criteria  : : TTE: Bubble study negative. LVEF 65-70%. No thrombus. Severe LA dilatation.     Recommendations & Plan:  - Q1 neuro checks, remain NSU status for vasopressor support  - BP goal 180-200 pending repeat MRI to evaluate stroke burden  - ASA & Plavix  - DVT Ppx: continue SCDs, ctn Q8hr subcutaneous heparin   - Ctn atorvastatin 80mg  - cvEEG with no epileptiform activity. Recommend discontinuing.   - PT/OT eval  - NSGY for consideration of carotid revascularization      Corey Domingo DO  Neurology, PGY-2

## 2024-11-14 NOTE — PROGRESS NOTES
[facility]  NEUROSCIENCE INTENSIVE CARE UNIT  DAILY PROGRESS NOTE       Patient Name: Zulma Rao   MRN: [id number]     Admit Date: 2024     : 1945 AGE: 79 y.o. GENDER: female        Subjective    79 y.o. female with PMH prior R sided stroke with left sided deficit (per personal review of imaging, L sided posterior limb of internal capsule) 2024, bilateral carotid artery stenosis S/P R CEA , HFpEF, NSTEMI, T2DM, HT, DLD, hypothyroidism, COPD, HCC S/P partial hepatectomy (), anxiety, b/l cataracts.  Admitted 2024 with Cerebrovascular accident (CVA) due to occlusion of left middle cerebral artery (Multi) after transferred for mechanical thrombectomy.     History obtained from  Mynor, who reported that pt was home for lunch today, last known well was 12pm. She was standing in the kitchen, and suddenly started having whole body shaking. He was able to catch her before falling to ground. Noticed that she had left sided facial droop that was new and pt was looking to the left, not speaking or communicating with him. He called 911 and EMS brought her to the [facility] ED. NIH on arrival was 20. BP on arrival was:     CTH completed showing dense left MCA sign. CTA head and neck completed showing proximal left M1 occlusion and what appears to be chronic right ICA occlusion. She was given TNK at 12:55. Life flighted to [facility] for MT pamelaal. NIH on arrival was 21 (breakdown below), BP on arrival was 130/93 with BG 93. Mechanical thrombectomy completed and post NIH score was:  Admitted to NSU.      Pt's  reports history of stroke in July of this year for which she originally had left sided weakness and facial droop that completely resolved and was back to her normal baseline prior to stroke. Was able to ambulate independently, but then fell in September and broke her right hip requiring surgical fixation. She was doing well, walking with walker for long distance but  recently has been ambulating independently.  reported no history of arrhythmias and reports pt was taking daily plavix.       Significant Events:  - 11/11: TNK and spontaneous recanalization on angiogram TICI 3  - 11/13: Had pressure-dependent exam, placed on Levo and Miguel. NIH 4    Interval Events:   11/14/24 Overnight: aphasic, CTA HN completed w no change in LVO, on pressors for -200, try to wean off miguel --> patient developed tachycardia getting IV bolus (NSS 1L x2, albumin 5% 12.5) --> starting vaso to come down on levo, exam at SBP 180s showed mixed aphasia, plan for MRI first and come off pressors if infarct complete    AM: Able to tell her name       Objective   VITALS (24H):  Temp:  [35.5 °C (95.9 °F)-36.5 °C (97.7 °F)] 35.5 °C (95.9 °F)  Heart Rate:  [] 89  Resp:  [12-28] 13  BP: (176-180)/(59-79) 179/79  Arterial Line BP 1: (134-200)/(43-78) 170/57  INTAKE/OUTPUT:  Intake/Output Summary (Last 24 hours) at 11/14/2024 0643  Last data filed at 11/14/2024 0605  Gross per 24 hour   Intake 4988.13 ml   Output 3850 ml   Net 1138.13 ml     VENT SETTINGS:        PHYSICAL EXAM:  NEURO:   - Alert, oriented x2-3, follows simple commands  - EOS, PERRL, EOMI, VFF  - NIHSS as below  CV:  - RRR on telemetry, NSR  - Unable to palpate or doppler pulse in both legs  RESP:  - No signs of resp distress  - Oxygen: Nasal cannula 2L  :  - External catheter in place  GI:  - Abdomen NT/ND, soft  SKIN:  - Intact    11/14/24 @ 8:00AM (SBP about 200)  NIHSS:   - Level of consciousness: 0 = Alert  - LOC Question: 1 = One correct  - LOC Commands: 0 = Obeys both  - Best Gaze: 0 = Normal  - Visual Field: 0 = No visual loss  - Facial Paresis: 0 = Normal  - LUE: 0 = No drift; 10 sec  - RUE: 2 = Effort vs gravity  - LLE: 0 = No drift; 5 sec  - RLE: 0 = No drift; 5 sec  - Limb Ataxia: 0 = Absent  - Sensory: 0 = Absent  - Best Language: 0 = No aphasia  - Dysarthria: 1 = Mild-moderate  - Neglect: 0 = None  TOTAL: 6        MEDICATIONS:  Scheduled: PRN: Continuous:   aspirin, 81 mg, oral, Daily  atorvastatin, 80 mg, oral, Nightly  azithromycin, 250 mg, oral, Once per day on Monday Wednesday Friday  busPIRone, 10 mg, oral, TID  clopidogrel, 75 mg, oral, Daily  pantoprazole, 40 mg, oral, Daily before breakfast   Or  esomeprazole, 40 mg, nasoduodenal tube, Daily before breakfast   Or  pantoprazole, 40 mg, intravenous, Daily before breakfast  tiotropium, 2 puff, inhalation, Daily   And  fluticasone furoate-vilanteroL, 1 puff, inhalation, Daily  heparin (porcine), 5,000 Units, subcutaneous, q8h  insulin lispro, 0-5 Units, subcutaneous, TID AC  levothyroxine, 25 mcg, oral, Daily  lidocaine, 1 patch, transdermal, Daily  melatonin, 3 mg, oral, Nightly  polyethylene glycol, 17 g, oral, Daily  potassium phosphate, 21 mmol, intravenous, Once  sennosides, 2 tablet, oral, BID     PRN medications: acetaminophen, alteplase, calcium gluconate, calcium gluconate, dextrose, dextrose, glucagon, glucagon, HYDROmorphone, ipratropium-albuteroL, magnesium sulfate, magnesium sulfate, ondansetron ODT **OR** ondansetron, oxygen, potassium chloride CR **OR** potassium chloride, potassium chloride CR **OR** potassium chloride, potassium chloride norepinephrine, 0-0.5 mcg/kg/min, Last Rate: 0.14 mcg/kg/min (11/14/24 0540)  phenylephrine, 0-9 mcg/kg/min, Last Rate: Stopped (11/13/24 2021)  sodium chloride 0.9%, 75 mL/hr, Last Rate: 75 mL/hr (11/14/24 0123)  vasopressin, 0-0.03 Units/min, Last Rate: 0.03 Units/min (11/13/24 2315)         LAB RESULTS:  Results from last 72 hours   Lab Units 11/14/24  0216 11/13/24  0041   GLUCOSE mg/dL 137* 125*   SODIUM mmol/L 142 142   POTASSIUM mmol/L 3.4* 3.3*   CHLORIDE mmol/L 109* 105   CO2 mmol/L 21 28   ANION GAP mmol/L 15 12   BUN mg/dL 4* 4*   CREATININE mg/dL 0.32* 0.38*   EGFR mL/min/1.73m*2 >90 >90   CALCIUM mg/dL 7.0* 7.6*   PHOSPHORUS mg/dL 1.9* 2.0*   ALBUMIN g/dL 3.5 3.2*   MAGNESIUM mg/dL 1.73 2.42*   POCT  CALCIUM IONIZED (MMOL/L) IN BLOOD mmol/L 1.01* 1.01*      Results from last 72 hours   Lab Units 11/14/24  0216 11/13/24  0041   WBC AUTO x10*3/uL 9.2 14.2*   NRBC AUTO /100 WBCs 0.0 0.0   RBC AUTO x10*6/uL 3.29* 3.78*   HEMOGLOBIN g/dL 9.7* 11.4*   HEMATOCRIT % 28.7* 32.7*   MCV fL 87 87   MCH pg 29.5 30.2   MCHC g/dL 33.8 34.9   RDW % 13.6 13.7   PLATELETS AUTO x10*3/uL 129* 186   NEUTROS PCT AUTO % 73.5 70.4   IG PCT AUTO % 0.5 0.4   LYMPHS PCT AUTO % 14.7 15.6   MONOS PCT AUTO % 10.0 11.9   EOS PCT AUTO % 0.9 1.1   BASOS PCT AUTO % 0.4 0.6   NEUTROS ABS x10*3/uL 6.76* 10.02*   IG AUTO x10*3/uL 0.05 0.06   LYMPHS ABS AUTO x10*3/uL 1.35 2.21   MONOS ABS AUTO x10*3/uL 0.92* 1.69*   EOS ABS AUTO x10*3/uL 0.08 0.15   BASOS ABS AUTO x10*3/uL 0.04 0.08      Results from last 72 hours   Lab Units 11/14/24 0216 11/13/24  0041 11/12/24  0045 11/11/24  1757   ALK PHOS U/L  --   --   --  187*   BILIRUBIN TOTAL mg/dL  --   --   --  3.7*   BILIRUBIN DIRECT mg/dL  --   --   --  1.8*   PROTEIN TOTAL g/dL  --   --   --  6.2*   ALT U/L  --   --   --  64*   AST U/L  --   --   --  53*   ALBUMIN g/dL 3.5 3.2*   < > 2.7*    < > = values in this interval not displayed.      IMAGING RESULTS:  MRI (11/13)  1. Acute/subacute watershed ischemia throughout the left cerebral  hemisphere  2. Subcentimeter cortical infarctions involving the left superior  frontal lobe but also involving the left parietal and temporal lobes.  No evidence of hemorrhagic transformation.  3. Nonspecific white matter changes which can be seen in the setting  of chronic microangiopathy.    Reviewed OSH imaging on PACS  CT head wo IV contrast    Result Date: 11/12/2024  CTA neck:   Complete occlusion of the right common carotid artery and right cervical internal carotid artery.   Coarse atherosclerotic calcification at the left carotid bifurcation with approximately 65% stenosis by NASCET criteria.   The vertebral arteries are patent. The left vertebral artery is  dominant. There is a coarse calcification at the origin of the right vertebral artery.   Calcified and noncalcified plaque along the aortic arch and origins of the great vessels with narrowing.   CTA head:   There is an abrupt cutoff of the distal left anterior cerebral artery.   Occlusion of the right petrous and cavernous carotid arteries with reconstitution in the supraclinoid segment.   The left intracranial carotid artery is patent with coarse atherosclerotic calcification and mild narrowing.   The posterior circulation is patent without significant stenosis.   CT head:   No acute intracranial hemorrhage or mass effect.   I personally reviewed the images/study and I agree with the findings as stated by Simone Joy MD (Radiology Resident).   MACRO: Simone Joy discussed the significance and urgency of this critical finding by telephone with  Jeaneth Graves MD on 11/12/2024 at 5:59 am.  (**-RCF-**) Findings:  See findings.   .   Signed by: Latesha Zuniga 11/12/2024 7:20 AM Dictation workstation:   [id number]    CT angio head and neck w and wo IV contrast    Result Date: 11/12/2024  CTA neck:   Complete occlusion of the right common carotid artery and right cervical internal carotid artery.   Coarse atherosclerotic calcification at the left carotid bifurcation with approximately 65% stenosis by NASCET criteria.   The vertebral arteries are patent. The left vertebral artery is dominant. There is a coarse calcification at the origin of the right vertebral artery.   Calcified and noncalcified plaque along the aortic arch and origins of the great vessels with narrowing.   CTA head:   There is an abrupt cutoff of the distal left anterior cerebral artery.   Occlusion of the right petrous and cavernous carotid arteries with reconstitution in the supraclinoid segment.   The left intracranial carotid artery is patent with coarse atherosclerotic calcification and mild narrowing.   The  posterior circulation is patent without significant stenosis.   CT head:   No acute intracranial hemorrhage or mass effect.   I personally reviewed the images/study and I agree with the findings as stated by Simone Joy MD (Radiology Resident).   MACRO: Simone Joy discussed the significance and urgency of this critical finding by telephone with  Jeaneth Graves MD on 11/12/2024 at 5:59 am.  (**-RCF-**) Findings:  See findings.   .   Signed by: Ltaesha Zuniga 11/12/2024 7:20 AM Dictation workstation:   [id number]    CT head wo IV contrast    Result Date: 11/12/2024  No acute intracranial hemorrhage or mass effect.   Small focal area of encephalomalacia and gliosis within the right occipital lobe.   Mild white matter changes compatible with microangiopathy. Lacunar infarct within the right basal ganglia.   I personally reviewed the images/study and I agree with the findings as stated by Simone Joy MD (Radiology Resident).   MACRO: None   Signed by: Latesha Zuniga 11/12/2024 7:03 AM Dictation workstation:   [id number]       Assessment/Plan    Update 11/14/24  :: CTA HN (11/13): Interval increased CT conspicuity, unchanged occlusion of the left DAKOTA callosal marginal branch, R CCA, R ICA, unchanged flow stenosis in L ICA, L CCA, R>L subclavian artery  :: Prelim EEG: consistent with a left hemispheric structural lesion, no epileptiform discharges seen  - Repeat MRI brain   - [place] following, per note: decreased SBP goal to 160-180 as tolerated, start plavix, c/w ASA, consult NSGY for L carotid intervention iso chronic R carotid occlusion  - SLP following, per note: NPO, meds per medical team, plan for MBS 11/14  - NSGY following, per note: will evaluate for possible revascularization, please consult cardiology for OR clearance and optimization, obtain CBC, RFP, coag, T&S, UA, EKG, CXR    Dispo:  Stroke  PT/OT: PT - moderate intensity, OT - high intensity  - Contact vas Sx  fellow over chat closer to discharge to coordinate follow up PAD with aortic occlusion  -----    NEURO:  # L MCA stroke S/P TNK, Spontaneous recanalization TICI 3  # R sided stroke with left sided deficit (per personal review of imaging L sided posterior limb of internal capsule) 7/2024  # Bilateral carotid artery stenosis S/P R CEA 2011  Assessment:  - Neurologically: see exam above  - Fluctuating exams at times  - LDL (11/11) 92, A1c (11/11) 5.8  - BNP (11/11) 188  - CTA HN (11/12): complete occlusion of R CCA and R cervical ICA. 65% stenosis of L   Carotid bifurcation, abrupt cutoff of the distal L DAKOTA, occlusion of the right petrous and cavernous carotid arteries  - MRI Brain (11/12): Acute/subacute watershed ischemia throughout the left cerebral hemisphere, subcentimeter cortical infarctions upon the left superior frontal lobe, left parietal and temporal lobes  - CTA HN (11/13): Interval increased CT conspicuity, unchanged occlusion of the left DAKOTA callosal marginal branch, R CCA, R ICA, unchanged flow stenosis in L ICA, L CCA, R>L subclavian artery  - Prelim EEG: consistent with a left hemispheric structural lesion, no epileptiform discharges seen  Plan:  - NSU  - Neuro Checks: Q1H  - Sedation: None  - Pain: acetaminophen PRN, Q6H  - Nausea: ondansetron and None  - PT/OT/SLP  - c/w home Atorvastatin 80mg  - c/w aspirin 81mg, plavix 75mg  - [place] following, per note: decreased SBP goal to 160-180 as tolerated, start plavix, c/w ASA, consult NSGY for L carotid intervention iso chronic R carotid occlusion  - Repeat MRI brain   - c/w EEG for 24h, plan to dc tomorrow  - Keep -200 for now  - NSGY following for L ICA intervention, per note: will evaluate for possible revascularization, please consult cardiology for OR clearance and optimization, obtain CBC, RFP, coag, T&S, UA, EKG, CXR    CARDIOVASCULAR:  # Severe PAD 2/2 Chronic infrarenal aortic occlusion  # HFpEF  # History of HTN, HLD  Assessment:  - EKG:  pending  - ECHO 11/12/24: LVEF 65-70%, LVH, severely dilated LA, negative bubble study  - CT Aorti-ileofemoral w/ runoff: Extensive vascular disease including occlusion of the infrarenal abdominal aorta, bilateral common iliac arteries, no flow appreciated left PT   Plan:  - Continue to monitor on telemetry  - BP goal: 180-200  --> on Levo and Miguel, wean as tolerated  - c/w monitor doppler  - Vasc Sx signed off, per note: no acute intervention . Good flow to radial artery. Chronic infrarenal aortic occlusion. Continue to monitor doppler signals    RESPIRATORY:  # COPD  Assessment:  - PFT (10/05/2023 at OSH): FEV1 50% predicted. FEV1/FVC 69%. No TLC obtained. DLCO 40%   Plan:  - Continuous pulse oximetry   - O2 PRN to maintain SpO2 > 88%, wean as tolerated  - Incentive spirometry while awake  - Continue Trelegy Ellipta (change to inpatient formula)    RENAL/:  #JAIME, resolved  Assessment:  - Baseline BUN/Cr: 12/0.63  Results from last 72 hours   Lab Units 11/14/24  0216 11/13/24  0041   BUN mg/dL 4* 4*   CREATININE mg/dL 0.32* 0.38*       - likely from contrast, now resolved with renal function return to baseline  Plan:  - Monitor with daily RFP  - Maintain external urinary diversion device for strict I&O  - c/w NSS 75ml/hr    FEN/GI:  # HCC S/P partial hepatectomy (2019)  #Oral dysphagia  Assessment:  - Last BM: PTA  Plan:  - Monitor and replace electrolytes per protocol  - IVF: NSS 75ml/hr  - Diet: NPO  - May require Corpak (do not tolerate)  - Bowel Regimen: Docusate-Senna BID and Miralax QD  - SLP following, per note: NPO, meds per medical team, plan for [facility] [place] 11/14    ENDOCRINE:  # T2DM  # Hypothyroidism  Assessment:  Results from last 7 days   Lab Units 11/14/24  0333 11/14/24  0216 11/13/24  2346 11/13/24  1952 11/13/24  1144 11/13/24  0811 11/13/24  0041 11/12/24  1946   POCT GLUCOSE mg/dL 145*  --  125* 141* 114* 106*  --  118*   GLUCOSE mg/dL  --  137*  --   --   --   --  125*  --    - HbA1C: 6.3 9/2024    Plan:  - Accuchecks & ISS Q4H   - Hold home metformin  - Continue home Levothyroxine  - Hypoglycemia monitoring     HEMATOLOGY:  #JOSE, possibly hemodilution  Assessment:  - Baseline Hgb: 11.6  - Baseline Plts: 258  Results from last 7 days   Lab Units 24  0216 24  0041   HEMOGLOBIN g/dL 9.7* 11.4*   HEMATOCRIT % 28.7* 32.7*   PLATELETS AUTO x10*3/uL 129* 186     Results from last 72 hours   Lab Units 24  0216 24  0041   WBC AUTO x10*3/uL 9.2 14.2*   NRBC AUTO /100 WBCs 0.0 0.0   RBC AUTO x10*6/uL 3.29* 3.78*   HEMOGLOBIN g/dL 9.7* 11.4*   HEMATOCRIT % 28.7* 32.7*   MCV fL 87 87   MCH pg 29.5 30.2   MCHC g/dL 33.8 34.9   RDW % 13.6 13.7   PLATELETS AUTO x10*3/uL 129* 186      Plan:  - Continue to monitor with daily CBC and Coag panel    INFECTIOUS DISEASE:  #JOSE  Assessment:  Results from last 7 days   Lab Units 24  0216 24  0041   WBC AUTO x10*3/uL 9.2 14.2*    - Temp (24hrs), Av.1 °C (97 °F), Min:35.5 °C (95.9 °F), Max:36.5 °C (97.7 °F)     Plan:  - Continue to monitor for s/sx of infection  - Pan culture for temperature > 38.4 C    MUSCULOSKELETAL:  - No acute issues    SKIN:  - No acute issues  - Turns and skin care per NSU protocol    ACCESS:  - PIV    PROPHYLAXIS:  - DVT Ppx: SCDs and SQH  - GI Ppx: Pantoprazole    RESTRAINTS:  Not indicated/Patient does not meet criteria for restraints    Lashanda Handley MD  Neuro Critical Care    The patient is critically ill with acute left MCA stroke  Neurologically worsened  Waxing and waning exam may be perfusion dependent: keep sbp>160  Secondary stroke prophylaxis per stroke team: Cont antiplatelet agents, may require intervention for carotid stenosis  Chronic diastolic heart failure  COPD: Cont inhalers  Diabetes: Cont BG control  Swallow evaluation     I have seen and examined the patient.  I have reviewed the patient's laboratory, radiographic, and clinical data.  I have spent 30 minutes providing critical care  for the patient.       CARISSA Santo M.D.

## 2024-11-15 ENCOUNTER — ANESTHESIA EVENT (OUTPATIENT)
Dept: OPERATING ROOM | Facility: HOSPITAL | Age: 79
End: 2024-11-15
Payer: MEDICARE

## 2024-11-15 LAB
ALBUMIN SERPL BCP-MCNC: 3.5 G/DL (ref 3.4–5)
ANION GAP SERPL CALC-SCNC: 12 MMOL/L (ref 10–20)
ATRIAL RATE: 94 BPM
BASOPHILS # BLD AUTO: 0.05 X10*3/UL (ref 0–0.1)
BASOPHILS NFR BLD AUTO: 0.5 %
BUN SERPL-MCNC: 5 MG/DL (ref 6–23)
CA-I BLD-SCNC: 0.97 MMOL/L (ref 1.1–1.33)
CALCIUM SERPL-MCNC: 8.1 MG/DL (ref 8.6–10.6)
CHLORIDE SERPL-SCNC: 107 MMOL/L (ref 98–107)
CO2 SERPL-SCNC: 27 MMOL/L (ref 21–32)
CREAT SERPL-MCNC: 0.32 MG/DL (ref 0.5–1.05)
EGFRCR SERPLBLD CKD-EPI 2021: >90 ML/MIN/1.73M*2
EOSINOPHIL # BLD AUTO: 0.36 X10*3/UL (ref 0–0.4)
EOSINOPHIL NFR BLD AUTO: 3.9 %
ERYTHROCYTE [DISTWIDTH] IN BLOOD BY AUTOMATED COUNT: 13.9 % (ref 11.5–14.5)
GLUCOSE BLD MANUAL STRIP-MCNC: 100 MG/DL (ref 74–99)
GLUCOSE BLD MANUAL STRIP-MCNC: 102 MG/DL (ref 74–99)
GLUCOSE BLD MANUAL STRIP-MCNC: 110 MG/DL (ref 74–99)
GLUCOSE BLD MANUAL STRIP-MCNC: 120 MG/DL (ref 74–99)
GLUCOSE BLD MANUAL STRIP-MCNC: 146 MG/DL (ref 74–99)
GLUCOSE BLD MANUAL STRIP-MCNC: 85 MG/DL (ref 74–99)
GLUCOSE SERPL-MCNC: 92 MG/DL (ref 74–99)
HCT VFR BLD AUTO: 29.1 % (ref 36–46)
HGB BLD-MCNC: 9.9 G/DL (ref 12–16)
IMM GRANULOCYTES # BLD AUTO: 0.02 X10*3/UL (ref 0–0.5)
IMM GRANULOCYTES NFR BLD AUTO: 0.2 % (ref 0–0.9)
LYMPHOCYTES # BLD AUTO: 1.13 X10*3/UL (ref 0.8–3)
LYMPHOCYTES NFR BLD AUTO: 12.4 %
MAGNESIUM SERPL-MCNC: 2.26 MG/DL (ref 1.6–2.4)
MCH RBC QN AUTO: 29.8 PG (ref 26–34)
MCHC RBC AUTO-ENTMCNC: 34 G/DL (ref 32–36)
MCV RBC AUTO: 88 FL (ref 80–100)
MONOCYTES # BLD AUTO: 0.94 X10*3/UL (ref 0.05–0.8)
MONOCYTES NFR BLD AUTO: 10.3 %
NEUTROPHILS # BLD AUTO: 6.62 X10*3/UL (ref 1.6–5.5)
NEUTROPHILS NFR BLD AUTO: 72.7 %
NRBC BLD-RTO: 0 /100 WBCS (ref 0–0)
P AXIS: 86 DEGREES
P OFFSET: 202 MS
P ONSET: 146 MS
PHOSPHATE SERPL-MCNC: 2.5 MG/DL (ref 2.5–4.9)
PLATELET # BLD AUTO: 143 X10*3/UL (ref 150–450)
POTASSIUM SERPL-SCNC: 3.1 MMOL/L (ref 3.5–5.3)
PR INTERVAL: 144 MS
Q ONSET: 218 MS
QRS COUNT: 15 BEATS
QRS DURATION: 78 MS
QT INTERVAL: 390 MS
QTC CALCULATION(BAZETT): 487 MS
QTC FREDERICIA: 453 MS
R AXIS: 41 DEGREES
RBC # BLD AUTO: 3.32 X10*6/UL (ref 4–5.2)
SODIUM SERPL-SCNC: 143 MMOL/L (ref 136–145)
T AXIS: 31 DEGREES
T OFFSET: 413 MS
VENTRICULAR RATE: 94 BPM
WBC # BLD AUTO: 9.1 X10*3/UL (ref 4.4–11.3)

## 2024-11-15 PROCEDURE — 2500000005 HC RX 250 GENERAL PHARMACY W/O HCPCS

## 2024-11-15 PROCEDURE — 2500000001 HC RX 250 WO HCPCS SELF ADMINISTERED DRUGS (ALT 637 FOR MEDICARE OP)

## 2024-11-15 PROCEDURE — 2020000001 HC ICU ROOM DAILY

## 2024-11-15 PROCEDURE — 83735 ASSAY OF MAGNESIUM: CPT

## 2024-11-15 PROCEDURE — 2500000004 HC RX 250 GENERAL PHARMACY W/ HCPCS (ALT 636 FOR OP/ED)

## 2024-11-15 PROCEDURE — 92526 ORAL FUNCTION THERAPY: CPT | Mod: GN

## 2024-11-15 PROCEDURE — 94640 AIRWAY INHALATION TREATMENT: CPT

## 2024-11-15 PROCEDURE — 92507 TX SP LANG VOICE COMM INDIV: CPT | Mod: GN

## 2024-11-15 PROCEDURE — 99291 CRITICAL CARE FIRST HOUR: CPT | Performed by: NEUROLOGICAL SURGERY

## 2024-11-15 PROCEDURE — 85025 COMPLETE CBC W/AUTO DIFF WBC: CPT

## 2024-11-15 PROCEDURE — 2500000004 HC RX 250 GENERAL PHARMACY W/ HCPCS (ALT 636 FOR OP/ED): Performed by: STUDENT IN AN ORGANIZED HEALTH CARE EDUCATION/TRAINING PROGRAM

## 2024-11-15 PROCEDURE — 82330 ASSAY OF CALCIUM: CPT

## 2024-11-15 PROCEDURE — 97535 SELF CARE MNGMENT TRAINING: CPT | Mod: GO

## 2024-11-15 PROCEDURE — 80069 RENAL FUNCTION PANEL: CPT

## 2024-11-15 PROCEDURE — 2500000002 HC RX 250 W HCPCS SELF ADMINISTERED DRUGS (ALT 637 FOR MEDICARE OP, ALT 636 FOR OP/ED)

## 2024-11-15 PROCEDURE — 2500000005 HC RX 250 GENERAL PHARMACY W/O HCPCS: Performed by: REGISTERED NURSE

## 2024-11-15 PROCEDURE — 2500000004 HC RX 250 GENERAL PHARMACY W/ HCPCS (ALT 636 FOR OP/ED): Mod: JZ

## 2024-11-15 PROCEDURE — 99233 SBSQ HOSP IP/OBS HIGH 50: CPT

## 2024-11-15 PROCEDURE — 82947 ASSAY GLUCOSE BLOOD QUANT: CPT

## 2024-11-15 PROCEDURE — 97110 THERAPEUTIC EXERCISES: CPT | Mod: GO

## 2024-11-15 RX ORDER — MIDODRINE HYDROCHLORIDE 10 MG/1
5 TABLET ORAL
Status: DISCONTINUED | OUTPATIENT
Start: 2024-11-15 | End: 2024-11-22 | Stop reason: HOSPADM

## 2024-11-15 RX ORDER — GABAPENTIN 100 MG/1
200 CAPSULE ORAL 2 TIMES DAILY
Status: DISCONTINUED | OUTPATIENT
Start: 2024-11-15 | End: 2024-11-15

## 2024-11-15 RX ORDER — POTASSIUM CHLORIDE 20 MEQ/1
40 TABLET, EXTENDED RELEASE ORAL ONCE
Status: DISCONTINUED | OUTPATIENT
Start: 2024-11-15 | End: 2024-11-15

## 2024-11-15 RX ORDER — LABETALOL HYDROCHLORIDE 5 MG/ML
5 INJECTION, SOLUTION INTRAVENOUS ONCE
Status: COMPLETED | OUTPATIENT
Start: 2024-11-15 | End: 2024-11-15

## 2024-11-15 RX ORDER — POTASSIUM CHLORIDE 14.9 MG/ML
20 INJECTION INTRAVENOUS ONCE
Status: COMPLETED | OUTPATIENT
Start: 2024-11-15 | End: 2024-11-15

## 2024-11-15 RX ORDER — LABETALOL HYDROCHLORIDE 5 MG/ML
INJECTION, SOLUTION INTRAVENOUS
Status: COMPLETED
Start: 2024-11-15 | End: 2024-11-15

## 2024-11-15 RX ORDER — SODIUM,POTASSIUM PHOSPHATES 280-250MG
1 POWDER IN PACKET (EA) ORAL 4 TIMES DAILY
Status: COMPLETED | OUTPATIENT
Start: 2024-11-15 | End: 2024-11-15

## 2024-11-15 RX ADMIN — AZITHROMYCIN DIHYDRATE 250 MG: 250 TABLET, FILM COATED ORAL at 08:30

## 2024-11-15 RX ADMIN — LIDOCAINE 1 PATCH: 4 PATCH TOPICAL at 08:29

## 2024-11-15 RX ADMIN — HYDROMORPHONE HYDROCHLORIDE 0.2 MG: 1 INJECTION, SOLUTION INTRAMUSCULAR; INTRAVENOUS; SUBCUTANEOUS at 02:12

## 2024-11-15 RX ADMIN — LABETALOL HYDROCHLORIDE 5 MG: 5 INJECTION, SOLUTION INTRAVENOUS at 06:03

## 2024-11-15 RX ADMIN — BUSPIRONE HYDROCHLORIDE 10 MG: 10 TABLET ORAL at 08:30

## 2024-11-15 RX ADMIN — PANTOPRAZOLE SODIUM 40 MG: 40 INJECTION, POWDER, FOR SOLUTION INTRAVENOUS at 06:05

## 2024-11-15 RX ADMIN — HEPARIN SODIUM 5000 UNITS: 5000 INJECTION INTRAVENOUS; SUBCUTANEOUS at 00:18

## 2024-11-15 RX ADMIN — ATORVASTATIN CALCIUM 80 MG: 80 TABLET, FILM COATED ORAL at 20:06

## 2024-11-15 RX ADMIN — BUSPIRONE HYDROCHLORIDE 10 MG: 10 TABLET ORAL at 16:29

## 2024-11-15 RX ADMIN — HEPARIN SODIUM 5000 UNITS: 5000 INJECTION INTRAVENOUS; SUBCUTANEOUS at 16:30

## 2024-11-15 RX ADMIN — POLYETHYLENE GLYCOL 3350 17 G: 17 POWDER, FOR SOLUTION ORAL at 08:29

## 2024-11-15 RX ADMIN — GABAPENTIN 200 MG: 100 CAPSULE ORAL at 05:28

## 2024-11-15 RX ADMIN — MELATONIN 3 MG: 3 TAB ORAL at 20:05

## 2024-11-15 RX ADMIN — SENNOSIDES 17.2 MG: 8.6 TABLET, FILM COATED ORAL at 20:05

## 2024-11-15 RX ADMIN — POTASSIUM & SODIUM PHOSPHATES POWDER PACK 280-160-250 MG 1 PACKET: 280-160-250 PACK at 06:05

## 2024-11-15 RX ADMIN — POTASSIUM CHLORIDE 20 MEQ: 14.9 INJECTION, SOLUTION INTRAVENOUS at 03:45

## 2024-11-15 RX ADMIN — BUSPIRONE HYDROCHLORIDE 10 MG: 10 TABLET ORAL at 20:06

## 2024-11-15 RX ADMIN — HEPARIN SODIUM 5000 UNITS: 5000 INJECTION INTRAVENOUS; SUBCUTANEOUS at 08:30

## 2024-11-15 RX ADMIN — MIDODRINE HYDROCHLORIDE 5 MG: 5 TABLET ORAL at 12:32

## 2024-11-15 RX ADMIN — MIDODRINE HYDROCHLORIDE 5 MG: 5 TABLET ORAL at 16:30

## 2024-11-15 RX ADMIN — LEVOTHYROXINE SODIUM 25 MCG: 25 TABLET ORAL at 06:05

## 2024-11-15 RX ADMIN — SENNOSIDES 17.2 MG: 8.6 TABLET, FILM COATED ORAL at 08:29

## 2024-11-15 RX ADMIN — FLUTICASONE FUROATE AND VILANTEROL TRIFENATATE 1 PUFF: 100; 25 POWDER RESPIRATORY (INHALATION) at 10:08

## 2024-11-15 RX ADMIN — ONDANSETRON 4 MG: 2 INJECTION INTRAMUSCULAR; INTRAVENOUS at 10:10

## 2024-11-15 RX ADMIN — TIOTROPIUM BROMIDE INHALATION SPRAY 2 PUFF: 3.12 SPRAY, METERED RESPIRATORY (INHALATION) at 10:09

## 2024-11-15 RX ADMIN — CALCIUM GLUCONATE 1 G: 20 INJECTION, SOLUTION INTRAVENOUS at 02:26

## 2024-11-15 RX ADMIN — ONDANSETRON 4 MG: 2 INJECTION INTRAMUSCULAR; INTRAVENOUS at 05:48

## 2024-11-15 RX ADMIN — POTASSIUM & SODIUM PHOSPHATES POWDER PACK 280-160-250 MG 1 PACKET: 280-160-250 PACK at 12:35

## 2024-11-15 RX ADMIN — Medication 3 L/MIN: at 10:00

## 2024-11-15 RX ADMIN — POTASSIUM CHLORIDE 40 MEQ: 1.5 POWDER, FOR SOLUTION ORAL at 05:28

## 2024-11-15 RX ADMIN — ASPIRIN 81 MG CHEWABLE TABLET 81 MG: 81 TABLET CHEWABLE at 20:05

## 2024-11-15 ASSESSMENT — COGNITIVE AND FUNCTIONAL STATUS - GENERAL
HELP NEEDED FOR BATHING: A LOT
MOVING FROM LYING ON BACK TO SITTING ON SIDE OF FLAT BED WITH BEDRAILS: A LOT
DRESSING REGULAR LOWER BODY CLOTHING: A LOT
DAILY ACTIVITIY SCORE: 14
PERSONAL GROOMING: A LITTLE
TOILETING: A LOT
WALKING IN HOSPITAL ROOM: A LOT
HELP NEEDED FOR BATHING: A LOT
PERSONAL GROOMING: A LOT
CLIMB 3 TO 5 STEPS WITH RAILING: A LOT
TURNING FROM BACK TO SIDE WHILE IN FLAT BAD: A LOT
DAILY ACTIVITIY SCORE: 12
DRESSING REGULAR UPPER BODY CLOTHING: A LOT
MOBILITY SCORE: 12
DRESSING REGULAR UPPER BODY CLOTHING: A LOT
STANDING UP FROM CHAIR USING ARMS: A LOT
EATING MEALS: A LOT
MOVING TO AND FROM BED TO CHAIR: A LOT
DRESSING REGULAR LOWER BODY CLOTHING: A LOT
TOILETING: A LOT
EATING MEALS: A LITTLE

## 2024-11-15 ASSESSMENT — PAIN SCALES - GENERAL
PAINLEVEL_OUTOF10: 0 - NO PAIN
PAINLEVEL_OUTOF10: 0 - NO PAIN
PAINLEVEL_OUTOF10: 9
PAINLEVEL_OUTOF10: 0 - NO PAIN
PAINLEVEL_OUTOF10: 0 - NO PAIN
PAINLEVEL_OUTOF10: 2

## 2024-11-15 ASSESSMENT — PAIN - FUNCTIONAL ASSESSMENT
PAIN_FUNCTIONAL_ASSESSMENT: 0-10

## 2024-11-15 ASSESSMENT — ACTIVITIES OF DAILY LIVING (ADL): HOME_MANAGEMENT_TIME_ENTRY: 23

## 2024-11-15 NOTE — PROGRESS NOTES
Occupational Therapy    OT Treatment    Patient Name: Zulma Rao  MRN: [id number]  Department: [facility]  Room: 09/09-A  Today's Date: 11/15/2024  Time Calculation  Start Time: 1137  Stop Time: 1215  Time Calculation (min): 38 min        Assessment:  OT Assessment: Recommendation changed from high intensity to moderate intensity. Pt continues to demonstrate deficits in ADL/IADL particiption, bed mobility, transfers, functional mobility, endurance, fine/gross motor coordination, cognition, sensation, trunk control, and UE strength/ROM. Pt would benefit from skilled OT services to address deficits and increase occupational performance for a safe return home.   Evaluation/Treatment Tolerance: Patient tolerated treatment well  Medical Staff Made Aware: Yes  End of Session Communication: Bedside nurse  End of Session Patient Position: Bed, 3 rail up, Alarm on  Evaluation/Treatment Tolerance: Patient tolerated treatment well  Medical Staff Made Aware: Yes  Plan:  Treatment Interventions: ADL retraining, Functional transfer training, UE strengthening/ROM, Endurance training, Cognitive reorientation, Patient/family training, Neuromuscular reeducation, Fine motor coordination activities, Compensatory technique education  OT Frequency: 3 times per week  OT Discharge Recommendations: Moderate intensity level of continued care  Equipment Recommended upon Discharge:  (TBD)  OT - OK to Discharge: Yes  Treatment Interventions: ADL retraining, Functional transfer training, UE strengthening/ROM, Endurance training, Cognitive reorientation, Patient/family training, Neuromuscular reeducation, Fine motor coordination activities, Compensatory technique education    Subjective   Previous Visit Info:  OT Last Visit  OT Received On: 11/15/24  General:  General  Missed Visit: No  Family/Caregiver Present: No  Prior to Session Communication: Bedside nurse  Patient Position Received: Bed, 3 rail up, Alarm off, not on at start of  session  General Comment: Pt agreeable and cooperative. Pt with hernandez, A-line, 3L O2 NC, .02 levo, and tele.  Precautions:  Hearing/Visual Limitations: Hearing and vision WFL. Pt with hx of b/l cataracts. Pt reports she does not wear glasses.  Medical Precautions: Fall precautions, Cardiac precautions  Precautions Comment: -200      Vital Signs     Vitals Session Pre OT During OT Post OT   Heart Rate 102  106   Resp  15  30   SpO2 90  90   /56  198/71     120   ICP            Vital Signs Comment: Pt SBP fluctuated between 176-210 during session while sitting with bed in chair position. -110, O2 85-92%, RR 15-30 during session. Pt required verbal cues for deep breathing which increased O2 levels during session.     Pain:  Pain Assessment  Pain Assessment: 0-10  0-10 (Numeric) Pain Score:  (Pt did not rate pain but reported pain during movement)  Pain Type: Acute pain  Pain Location:  (R hip and LLE)    Objective    Cognition:  Cognition  Overall Cognitive Status: Impaired  Orientation Level: Oriented X4  Following Commands:  (Pt follows 85% one-step commands with increased time, repetition, and verbal/tactile cues)  Cognition Comments: Pt required increased time to answer questions and during conversation. Pt required repetition and verbal cueing during session for tasks.  Attention: Exceptions to WFL  Memory: Exceptions to WFL  Problem Solving: Exceptions to WFL  Numeric Reasoning: Within Functional Limits  Abstract Reasoning: Exceptions to WFL  Safety/Judgement: Within Functional Limits  Insight: Mild  Impulsive: Within functional limits  Processing Speed: Delayed  Cognition Test Scores  Cognition Tests: Cognition Test Performed (Pt completed CAM-ICU. See outcome measures for details. Pt CAM (-).)  Coordination:  Movements are Fluid and Coordinated: No  Activities of Daily Living: Grooming  Grooming Level of Assistance: Maximum assistance, Moderate assistance  Grooming Where Assessed: Bed  level  Grooming Comments: Pt required maximal assistance to brush hair. Pt able to use LUE to brush hair with 25% completion. Pt required assistance for throroughness and verbal cues for sequencing. Pt required moderate assistance to brush teeth. Pt able to use LUE to brush teeth with 50% accuracy. Pt required assistance for throroughness and verbal cues for sequencing and termination of task.    UE Bathing  UE Bathing Level of Assistance: Maximum assistance  UE Bathing Where Assessed: Bed level  UE Bathing Comments: Pt required maximal assistance to wash hair with shampoo cap. Pt required assistance to don cap. Pt able to use LUE to scrub shampoo with LUE. Pt required assistance for throroughness and verbal cues for sequencing.    LE Dressing  LE Dressing: Yes  Sock Level of Assistance: Maximum assistance  LE Dressing Where Assessed: Bed level  LE Dressing Comments: Pt required max assistance to place LLE into figure four position. Pt demonstrated difficulty manipulating the sock with the LUE. Pt neglecting the RUE during the task and required verbal cues to incorporate the RUE to promote bilateral integration. Pt required verbal cues for seuqencing. Pt required assistance to don sock over L toes, but able to reach anteriorly with the LUE to don sock over heel with verbal cues. Pt demonstrated pain when RLE was placed into figure four position. Pt required total assistance to don sock over R foot.  Functional Standing Tolerance:     Bed Mobility/Transfers: Bed Mobility  Bed Mobility: Yes  Bed Mobility 1  Bed Mobility 1: Rolling left  Level of Assistance 1: Maximum assistance  Bed Mobility Comments 1: HOB slightly elevated. Drawsheet utilized.  Bed Mobility 2  Bed Mobility  2: Scooting  Level of Assistance 2: Dependent, +2  Bed Mobility Comments 2: HOB slightly elevated. Drawsheet utilized.    Sitting Balance:  Static Sitting Balance  Static Sitting-Comment/Number of Minutes: Pt sat with progressive HOB elevation to  bed in chair position to monitor vitals. Pt with -210 while sitting with bed in chair position. EOB sitting deferred d/t SBP fluctuation during bed level ADLs.       Therapy/Activity: Therapeutic Exercise  Therapeutic Exercise Performed: Yes  Therapeutic Exercise Activity 1: Pt completed one set of x5 reps of PROM to the RUE including elbow abduction/adduction and  shoulder/elbow/wrist/digit flexion/extension. Pt completed same therapeutic exercises with AAROM to the LUE.      Outcome Measures:[facility] Daily Activity  Putting on and taking off regular lower body clothing: A lot  Bathing (including washing, rinsing, drying): A lot  Putting on and taking off regular upper body clothing: A lot  Toileting, which includes using toilet, bedpan or urinal: A lot  Taking care of personal grooming such as brushing teeth: A little  Eating Meals: A little  Daily Activity - Total Score: 14        , Confusion Assessment Method-ICU (CAM-ICU)  Feature 1: Acute Onset or Fluctuating Course: Positive  Feature 2: Inattention: Positive  Feature 3: Altered Level of Consciousness: Negative  Feature 4: Disorganized Thinking: Negative  Overall CAM-ICU: Negative  , and Early Mobility/Exercise Safety Screen: Proceed with mobilization - No exclusion criteria met  ICU Mobility Scale: Sitting in bed, exercises in bed [1]    Education Documentation  Body Mechanics, taught by DANIELA Mi at 11/15/2024  1:37 PM.  Learner: Patient  Readiness: Acceptance  Method: Explanation, Demonstration  Response: Verbalizes Understanding  Comment: Pt educated on role of OT and OT POC    Precautions, taught by DANIELA Mi at 11/15/2024  1:37 PM.  Learner: Patient  Readiness: Acceptance  Method: Explanation, Demonstration  Response: Verbalizes Understanding  Comment: Pt educated on role of OT and OT POC    ADL Training, taught by DANIELA Mi at 11/15/2024  1:37 PM.  Learner: Patient  Readiness: Acceptance  Method: Explanation,  Demonstration  Response: Verbalizes Understanding  Comment: Pt educated on role of OT and OT POC    Education Comments  No comments found.        OP EDUCATION:       Goals:  Encounter Problems       Encounter Problems (Active)       ADLs       Patient will perform UB and LB bathing with minimal assistance and verbal cues (Progressing)       Start:  11/13/24    Expected End:  11/27/24            Patient will independently complete upper body dressing donning and doffing all UE clothes  (Progressing)       Start:  11/13/24    Expected End:  11/27/24            Patient will complete lower body dressing donning and doffing all LE clothes with minimal assistance and verbal cues (Progressing)       Start:  11/13/24    Expected End:  11/27/24            Patient will independently complete daily grooming tasks.  (Progressing)       Start:  11/13/24    Expected End:  11/27/24            Patient will complete toileting including hygiene and clothing management with minimal assistance and verbal cues  (Progressing)       Start:  11/13/24    Expected End:  11/27/24               BALANCE       Pt will tolerate static/dynamic sitting tasks >10 min with CGA and without LOB during ADL tasks in order to return to PLOF.  (Progressing)       Start:  11/13/24    Expected End:  11/27/24               COGNITION/SAFETY       Patient will score WFL on standardized cognitive assessment with no cues and within reasonable time frame (Progressing)       Start:  11/13/24    Expected End:  11/27/24            Patient will follow 100% one-step commands with no verbal cues to allow improved ADL performance.  (Progressing)       Start:  11/13/24    Expected End:  11/27/24            Pt will complete ADL tasks involving sequencing, memory, and problem-solving with 100% accuracy.   (Progressing)       Start:  11/13/24    Expected End:  11/27/24               EXERCISE/STRENGTHENING       Patient will complete BUE exercises in order to improve strength  to 4/5 in LUE and >/= 3/5 in RUE for ADL performance.   (Progressing)       Start:  11/13/24    Expected End:  11/27/24               TRANSFERS       Patient will perform bed mobility with minimal assistance and verbal cues in order to improve safety and independence with mobility.  (Progressing)       Start:  11/13/24    Expected End:  11/27/24            Patient will complete functional transfers with moderate assistance and LRAD as needed (Progressing)       Start:  11/13/24    Expected End:  11/27/24

## 2024-11-15 NOTE — PROGRESS NOTES
"Zulma Rao is a 79 y.o. female on day 4 of admission presenting with Cerebrovascular accident (CVA) due to occlusion of left middle cerebral artery (Multi).    Subjective   NAEO    Objective     Physical Exam  Aox2 (3 w choice)  RUE ext  RLE wd  LUE FC AG  LLE FC AG    Last Recorded Vitals  Blood pressure 151/67, pulse 83, temperature 36.5 °C (97.7 °F), resp. rate 10, height 1.626 m (5' 4\"), weight 50.9 kg (112 lb 3.4 oz), SpO2 98%.  Intake/Output last 3 Shifts:  I/O last 3 completed shifts:  In: 5320 (104.5 mL/kg) [I.V.:1620 (31.8 mL/kg); Blood:500; IV Piggyback:3200]  Out: 5714 (112.3 mL/kg) [Urine:5714 (3.1 mL/kg/hr)]  Weight: 50.9 kg     Relevant Results  Lab Results   Component Value Date    WBC 9.1 11/15/2024    HGB 9.9 (L) 11/15/2024    HCT 29.1 (L) 11/15/2024    MCV 88 11/15/2024     (L) 11/15/2024       Assessment/Plan   Principal Problem:    Cerebrovascular accident (CVA) due to occlusion of left middle cerebral artery (Multi)    Zulma Rao is an 79 year old with h/o HFpEF, NSTEMI, T2DM, HTN, HLD, anxiety, hypothyroidism, COPD, previous R sided stroke (7/2024), b/l cataracts, bilateral carotid stenosis s/p R CEA (2011), HCC s/p partial hepatectomy (2019), p/w CVA 2/2 L M1 occlusion, s/p TNK, s/p MT w TICI 3, MRI L watershed infarcts, small infarcts involving left frontal, parietal, and temporal lobes, CTA 74% L carotid artery occlusion proximal to bifurcation, moderate R V2 narrowing, R carotid occlusion at origin, CTA stable    MRI brain stable evolving infarcts, interval increase watershed infarcts    Stroke primary  OR Mon 11/18 for L CEA  EEG  Hold Plavix, continue ASA  SBP goal 180-200 Per stroke  Cards recs- RCRI 3, elevated risk, but not prohibitive          Karla Orta MD     "

## 2024-11-15 NOTE — PROGRESS NOTES
Speech-Language Pathology    SLP Adult Inpatient  Speech-Language Pathology Treatment     Patient Name: Zulma Rao  MRN: [id number]  Today's Date: 11/15/2024  Time Calculation  Start Time: 0925  Stop Time: 0950  Time Calculation (min): 25 min         Assessment:  -Mild/moderate oral dysphagia and pharyngeal swallow largely WFL per MBSS 11/14- stable.     -Expressive and receptive language largely intact; mild/moderate cognitive deficits- stable.         Recommendations:  Solid Diet Recommendations: Minced & Moist   Liquid Diet Recommendations: Thin   Medications: Crushed in puree as cleared by medical team      Safe Swallow/Compensatory Strategies:  Upright positioning   Slow rate/small boluses   Alternate solids and liquids        Plan:  SLP Plan: Skilled SLP  SLP Frequency: 2x per week  Duration: 2-3 weeks  Discussed POC: Pt, medical team      Goals:  Patient will tolerate baseline/recommended PO diet without overt s/s of aspiration, further difficulty, or concern for aspiration-related complications in 90% of observed therapeutic trials.               Date initiated: 11/14/24              Expected Time Frame to Meet Goal: 2-3 weeks               Status: Progressing      Patient will participate in solid upgrade trials with adequate mastication/oral phase given no residue following swallow.               Date initiated: 11/14/24              Expected Time Frame to Meet Goal: 2-3 weeks               Status: Progressing     Patient/family will indicate understanding of dysphagia education: risk for aspiration, recommendations, and POC with > 80% accuracy via teach back method.               Date initiated: 11/14/24              Expected Time Frame to Meet Goal: 2-3 weeks               Status: Progressing      Pt will follow 1-step commands with > 80% accuracy with min cues.                Date initiated: 11/12/24              Expected Time Frame to Meet Goal: 2-3 weeks               Status: Goal Met      Pt will  respond to Y/N questions with > 80% accuracy given min cues.                Date initiated: 11/12/24              Expected Time Frame to Meet Goal: 2-3 weeks               Status: Goal Met      Pt will identify an object, its color, and its function with > 80% accuracy given min cues.                Date initiated: 11/12/24              Expected Time Frame to Meet Goal: 2-3 weeks               Status: Goal Met      Pt will express wants and needs to staff and family via total communication.              Date initiated: 11/12/24              Expected Time Frame to Meet Goal: 2-3 weeks               Status: Goal Met     Pt will participate in further cognitive-linguistic assessment.              Date initiated: 11/13/24              Expected Time Frame to Meet Goal: 1-2 sessions     Pt will respond to functional reasoning and problem solving questions with > 90% accuracy given min cues.              Date initiated: 11/15/24              Expected Time Frame to Meet Goal: 2-3 weeks   Status: Goal initiated         Subjective   RN cleared pt for tx.  Pt properly identified and treated bedside.  Resting, but able to alert for participation.  No c/o pain.  O2 via NC.  No family present.       Pt admitted w/ an acute L MCA CVA; s/p TNK and MT.  Plan for Carotid revascularization on 11/18/24.     MBSS 11/14: Mild/moderate oral dysphagia. Impairments most impacting swallowing efficiency include impaired bolus formation/transit, piecemeal swallow with solids and liquids.  Pharyngeal swallow largely intact.          Objective   SLP present for breakfast meal.  Pt requires partial assist w/ PO intake.  Oral phase continues to be impaired; prolonged/reduced efficiency.  Min oral residue w/ minced & moist solids (scrambled eggs); easily cleared via liquid wash.  Pt reporting dysphagia w/ PO intake; upon questioning she is referring to oral dysphagia.  Education provided re: results of MBSS and plan to manage.  Understanding  indicated.  No s/s of aspiration/pharyngeal dysphagia with puree solids, minced & moist solids, or thin liquids.       Expressive/receptive language informally judged to be WFL during session.  Pt independently oriented x4.  Responded to functional problem solving questions with accuracy in 2/3 trials, and functional reasoning questions with accuracy in 3/4 trials.  No needs voiced at end of session.  Call bell within reach.        11/15/24 at 12:11 PM - Nithya Trotter, SLP

## 2024-11-15 NOTE — NURSING NOTE
Gutierrez Lizarraga MD notified by assigned RN, ZHAO Bo, that patient systolic blood pressures remains above 200 despite holding patient's levophed infusion for the past 10 minutes. MD Lizarraga stated that he will order gabapentin at this time to help lower patient blood pressure. Electronic order placed, no other orders given at this time.

## 2024-11-15 NOTE — ANESTHESIA PREPROCEDURE EVALUATION
Patient: Zulma Rao    Procedure Information       Date: 11/18/24    Procedure: LEFT Endarterectomy Carotid Artery (Left)    Location: [facility] OR    Surgeons: Gil Dos Santos MD          Zulma Rao is an 79 year old with h/o HFpEF, NSTEMI, T2DM, HTN, HLD, anxiety, hypothyroidism, COPD, previous R sided stroke (7/2024), b/l cataracts, bilateral carotid stenosis s/p R CEA (2011), HCC s/p partial hepatectomy (2019), severe PAD 2/2 chronic infrarenal aortic occlusion, p/w CVA 2/2 L M1 occlusion, s/p TNK, s/p MT w TICI 3, MRI L watershed infarcts, small infarcts involving left frontal, parietal, and temporal lobes, CTA 74% L carotid artery occlusion proximal to bifurcation.     Patient is presenting following a L MCA stroke, was found to have significant stenosis of the left carotid artery, correlating with the watershed strokes seen on MRI and the pressure dependent exam. Due to the difficult access, stent placement may be difficult, and CEA may be more optimal.     The patient is requiring pressors (NE and vasopressin) to keep her SBP above 180 because the patient develops aphasia when it drops below that. BP goal 180-200, c/w ASA and plavix       TTE 11/12/24 CONCLUSIONS:   1. Left ventricular ejection fraction is normal, by visual estimate at 65-70%.   2. Spectral Doppler shows an abnormal pattern of left ventricular diastolic filling.   3. Left ventricular cavity size is decreased.   4. No left ventricular thrombus visualized.   5. There is normal right ventricular global systolic function.   6. The left atrium is severely dilated.   7. Agitated saline contrast study was negative for intracardiac shunt.   8. Normal aortic root.   9. No prior echocardiogram available for comparison.    - CT Aorti-ileofemoral w/ runoff: Extensive vascular disease including occlusion of the infrarenal abdominal aorta, bilateral common iliac arteries, no flow appreciated left PT       Relevant Problems    Anesthesia (within normal limits)      Neuro   (+) Cerebrovascular accident (CVA) due to occlusion of left middle cerebral artery (Multi)   (+) Left acute arterial ischemic stroke, MCA (middle cerebral artery) (Multi)       Clinical information reviewed:    Allergies  Meds             Vitals:    11/15/24 1200   BP:    Pulse:    Resp:    Temp: 35.9 °C (96.6 °F)   SpO2:        Past Surgical History:   Procedure Laterality Date    OTHER SURGICAL HISTORY  12/03/2018    Eye surgery    OTHER SURGICAL HISTORY  12/03/2018    Foot surgery    OTHER SURGICAL HISTORY  12/03/2018    Cataract surgery    OTHER SURGICAL HISTORY  12/03/2018    Carotid thromboendarterectomy    OTHER SURGICAL HISTORY  12/03/2018    Tonsillectomy     Past Medical History:   Diagnosis Date    Personal history of other diseases of the circulatory system     History of hypertension    Personal history of other diseases of the circulatory system     History of carotid artery stenosis    Personal history of other diseases of the nervous system and sense organs     History of cataract    Personal history of other endocrine, nutritional and metabolic disease     History of hyperlipidemia       Current Facility-Administered Medications:     acetaminophen (Tylenol) tablet 650 mg, 650 mg, oral, q6h PRN, MARCUS Finney-CNP, 650 mg at 11/14/24 1557    alteplase (Cathflo Activase) injection 2 mg, 2 mg, intra-catheter, PRN, Lashanda Handley MD    aspirin chewable tablet 81 mg, 81 mg, oral, Daily, MARCUS Finney-CNP, 81 mg at 11/14/24 2046    atorvastatin (Lipitor) tablet 80 mg, 80 mg, oral, Nightly, Lashanda Handley MD, 80 mg at 11/14/24 2046    azithromycin (Zithromax) tablet 250 mg, 250 mg, oral, Once per day on Monday Wednesday Friday, Lashanda Handley MD, 250 mg at 11/15/24 0830    busPIRone (Buspar) tablet 10 mg, 10 mg, oral, TID, Lashanda Handley MD, 10 mg at 11/15/24 0830    calcium gluconate 1 g  in sodium chloride (iso) IV 50 mL, 1 g, intravenous, q6h PRN, MARCUS Cardenas-CNP, Stopped at 11/15/24 0300    calcium gluconate 2 g in sodium chloride (iso)  mL, 2 g, intravenous, q6h PRN, Americo Aaron APRN-CNP    [Held by provider] clopidogrel (Plavix) tablet 75 mg, 75 mg, oral, Daily, Kalen Tejada, MARCUS-CNP, 75 mg at 11/14/24 1211    dextrose 50 % injection 12.5 g, 12.5 g, intravenous, q15 min PRN, Lashanda Handley MD    dextrose 50 % injection 25 g, 25 g, intravenous, q15 min PRN, Lashanda Handley MD    pantoprazole (ProtoNix) EC tablet 40 mg, 40 mg, oral, Daily before breakfast **OR** esomeprazole (NexIUM) suspension 40 mg, 40 mg, nasoduodenal tube, Daily before breakfast **OR** pantoprazole (ProtoNix) injection 40 mg, 40 mg, intravenous, Daily before breakfast, Lashanda Handley MD, 40 mg at 11/15/24 0605    tiotropium (Spiriva Respimat) 2.5 mcg/actuation inhaler 2 puff, 2 puff, inhalation, Daily, 2 puff at 11/15/24 1009 **AND** fluticasone furoate-vilanteroL (Breo Ellipta) 100-25 mcg/dose inhaler 1 puff, 1 puff, inhalation, Daily, Lashanda Handley MD, 1 puff at 11/15/24 1008    glucagon (Glucagen) injection 1 mg, 1 mg, intramuscular, q15 min PRN, Lashanda Handley MD    glucagon (Glucagen) injection 1 mg, 1 mg, intramuscular, q15 min PRN, Lashanda Handley MD    heparin (porcine) injection 5,000 Units, 5,000 Units, subcutaneous, q8h, Lashanda Handley MD, 5,000 Units at 11/15/24 0830    HYDROmorphone (Dilaudid) injection 0.2 mg, 0.2 mg, intravenous, q4h PRN, Tony Carpenter MD, 0.2 mg at 11/15/24 0212    insulin lispro injection 0-5 Units, 0-5 Units, subcutaneous, TID AC, Lashanda Handley MD, 1 Units at 11/14/24 1217    ipratropium-albuteroL (Duo-Neb) 0.5-2.5 mg/3 mL nebulizer solution 3 mL, 3 mL, nebulization, 4x daily PRN, Lashanda Handley MD    levothyroxine (Synthroid, Levoxyl) tablet 25 mcg, 25 mcg,  oral, Daily, Kalen Tejada, MARCUS-CNP, 25 mcg at 11/15/24 0605    lidocaine 4 % patch 1 patch, 1 patch, transdermal, Daily, Freeman Astorga, MARCUS-CNP, 1 patch at 11/15/24 0829    magnesium sulfate 2 g in sterile water for injection 50 mL, 2 g, intravenous, q6h PRN, MARCUS Cardenas-CNP, Stopped at 11/14/24 0746    magnesium sulfate 4 g in sterile water for injection 100 mL, 4 g, intravenous, q6h PRN, MARCUS Cardenas-CNP, Stopped at 11/12/24 1021    melatonin tablet 3 mg, 3 mg, oral, Nightly, Lashanda Handley MD, 3 mg at 11/14/24 2046    midodrine (Proamatine) tablet 5 mg, 5 mg, oral, TID, Lashanda Handley MD, 5 mg at 11/15/24 1232    norepinephrine (Levophed) 16 mg in sodium chloride 0.9% 250 mL (0.064 mg/mL) infusion (premix), 0-0.5 mcg/kg/min, intravenous, Continuous, Tony Carpenter MD, Last Rate: 1.89 mL/hr at 11/15/24 1100, 0.04 mcg/kg/min at 11/15/24 1100    ondansetron ODT (Zofran-ODT) disintegrating tablet 4 mg, 4 mg, oral, q8h PRN **OR** ondansetron (Zofran) injection 4 mg, 4 mg, intravenous, q8h PRN, Lashanda Handley MD, 4 mg at 11/15/24 1010    oxygen (O2) therapy, , inhalation, Continuous PRN - O2/gases, Lashanda Handley MD, 3 L/min at 11/15/24 0800    polyethylene glycol (Glycolax, Miralax) packet 17 g, 17 g, oral, Daily, Lashanda Handley MD, 17 g at 11/15/24 0829    potassium chloride CR (Klor-Con M20) ER tablet 20 mEq, 20 mEq, oral, q6h PRN **OR** potassium chloride (Klor-Con) packet 20 mEq, 20 mEq, oral, q6h PRN, MARCUS Cardenas-CNP    potassium chloride CR (Klor-Con M20) ER tablet 40 mEq, 40 mEq, oral, q6h PRN **OR** potassium chloride (Klor-Con) packet 40 mEq, 40 mEq, oral, q6h PRN, NICK Cardenas, 40 mEq at 11/15/24 0528    potassium chloride 20 mEq in sterile water for injection 100 mL, 20 mEq, intravenous, q6h PRN, NICK Cardenas, Stopped at 11/14/24 0746    sennosides (Senokot) tablet 17.2 mg, 2  tablet, oral, BID, Lashanda Handley MD, 17.2 mg at 11/15/24 0829    vasopressin (Vasostrict) 0.2 unit/mL in 5% dextrose 100 mL IV, 0-0.03 Units/min, intravenous, Continuous, Tony Carpenter MD, Stopped at 11/14/24 1045    Facility-Administered Medications Ordered in Other Encounters:     glucagon (Glucagen) injection 1 mg, 1 mg, intramuscular, q15 min PRN, Felipa Swift MD    glucagon (Glucagen) injection 1 mg, 1 mg, intramuscular, q15 min PRN, Felipa Swift MD    oxygen (O2) therapy, , inhalation, Continuous PRN - O2/gases, Felipa Swift MD  Prior to Admission medications    Medication Sig Start Date End Date Taking? Authorizing Provider   acetaminophen (Tylenol 8 HOUR) 650 mg ER tablet Take 1 tablet (650 mg) by mouth every 12 hours if needed for mild pain (1 - 3). Do not crush, chew, or split.    Historical Provider, MD   aspirin 81 mg EC tablet Take 1 tablet (81 mg) by mouth once daily.    Historical Provider, MD   atorvastatin (Lipitor) 80 mg tablet Take 1 tablet (80 mg) by mouth once daily.    Historical Provider, MD   azithromycin (Zithromax) 250 mg tablet Take 1 tablet (250 mg) by mouth. Three times a week ( Monday , Wednesday and Friday )    Historical Provider, MD   busPIRone (Buspar) 10 mg tablet Take 1 tablet (10 mg) by mouth 3 times a day.    Historical Provider, MD   cloNIDine (Catapres) 0.1 mg tablet Take 1 tablet (0.1 mg) by mouth 2 times a day as needed (if SPB > 170).    Historical Provider, MD   clopidogrel (Plavix) 75 mg tablet Take 1 tablet (75 mg) by mouth once daily.    Historical Provider, MD   ergocalciferol (Vitamin D-2) 1.25 MG (58332 UT) capsule Take 1 capsule (1,250 mcg) by mouth 1 (one) time per week.    Historical Provider, MD   fluticasone-umeclidin-vilanter (Trelegy Ellipta) 100-62.5-25 mcg blister with device Inhale 1 puff once daily.   At the same time each day    Historical Provider, MD   furosemide (Lasix) 20 mg tablet Take 1 tablet (20 mg) by mouth once daily.     "Historical Provider, MD   ipratropium-albuteroL (Duo-Neb) 0.5-2.5 mg/3 mL nebulizer solution Take 3 mL by nebulization 4 times a day as needed for wheezing or shortness of breath.    Historical Provider, MD   levothyroxine (Synthroid, Levoxyl) 25 mcg tablet Take 1 tablet (25 mcg) by mouth early in the morning.. Take on an empty stomach at the same time each day, either 30 to 60 minutes prior to breakfast    Historical Provider, MD   melatonin 3 mg tablet Take 1 tablet (3 mg) by mouth once daily at bedtime.    Historical Provider, MD   pantoprazole (ProtoNix) 40 mg EC tablet Take 1 tablet (40 mg) by mouth once daily in the morning. Take before meals. Do not crush, chew, or split.    Historical Provider, MD     Allergies   Allergen Reactions    Amoxicillin Unknown    Avelox [Moxifloxacin] Unknown    Doxycycline Unknown    Sulfa (Sulfonamide Antibiotics) Unknown    Vicodin [Hydrocodone-Acetaminophen] Unknown     Social History     Tobacco Use    Smoking status: Not on file    Smokeless tobacco: Not on file   Substance Use Topics    Alcohol use: Not on file         Chemistry    Lab Results   Component Value Date/Time     11/15/2024 0026    K 3.1 (L) 11/15/2024 0026     11/15/2024 0026    CO2 27 11/15/2024 0026    BUN 5 (L) 11/15/2024 0026    CREATININE 0.32 (L) 11/15/2024 0026    Lab Results   Component Value Date/Time    CALCIUM 8.1 (L) 11/15/2024 0026    ALKPHOS 187 (H) 11/11/2024 1757    AST 53 (H) 11/11/2024 1757    ALT 64 (H) 11/11/2024 1757    BILITOT 3.7 (H) 11/11/2024 1757          Lab Results   Component Value Date/Time    WBC 9.1 11/15/2024 0025    HGB 9.9 (L) 11/15/2024 0025    HCT 29.1 (L) 11/15/2024 0025     (L) 11/15/2024 0025     No results found for: \"PROTIME\", \"PTT\", \"INR\"  Encounter Date: 11/11/24   ECG 12 Lead   Result Value    Ventricular Rate 94    Atrial Rate 94    UT Interval 144    QRS Duration 78    QT Interval 390    QTC Calculation(Bazett) 487    P Axis 86    R Axis 41    T " Axis 31    QRS Count 15    Q Onset 218    P Onset 146    P Offset 202    T Offset 413    QTC Fredericia 453    Narrative    Sinus rhythm with Premature supraventricular complexes  Prolonged QT  Abnormal ECG  When compared with ECG of 13-NOV-2024 20:05,  Sinus rhythm has replaced Atrial flutter  ST no longer depressed in Inferior leads  ST no longer depressed in Anterior leads     No results found for this or any previous visit from the past 1095 days.    NPO Detail:  No data recorded     Physical Exam    Airway   Cardiovascular    Dental    Pulmonary    Abdominal      Other findings: Hx: of Ren blade #3  ETT size (mm): 7.0  Cormack-Lehane Classification: grade I - full view of glottis             Anesthesia Plan    History of general anesthesia?: yes  History of complications of general anesthesia?: no    ASA 4     general     intravenous induction   Anesthetic plan and risks discussed with patient.

## 2024-11-15 NOTE — PROGRESS NOTES
[facility]  NEUROSCIENCE INTENSIVE CARE UNIT  DAILY PROGRESS NOTE       Patient Name: Zulma Rao   MRN: [id number]     Admit Date: 2024     : 1945 AGE: 79 y.o. GENDER: female        Subjective    79 y.o. female with PMH prior R sided stroke with left sided deficit (per personal review of imaging, L sided posterior limb of internal capsule) 2024, bilateral carotid artery stenosis S/P R CEA , HFpEF, NSTEMI, T2DM, HT, DLD, hypothyroidism, COPD, HCC S/P partial hepatectomy (), anxiety, b/l cataracts.  Admitted 2024 with Cerebrovascular accident (CVA) due to occlusion of left middle cerebral artery (Multi) after transferred for mechanical thrombectomy.     History obtained from  Mynor, who reported that pt was home for lunch today, last known well was 12pm. She was standing in the kitchen, and suddenly started having whole body shaking. He was able to catch her before falling to ground. Noticed that she had left sided facial droop that was new and pt was looking to the left, not speaking or communicating with him. He called 911 and EMS brought her to the [facility] ED. NIH on arrival was 20. BP on arrival was:     CTH completed showing dense left MCA sign. CTA head and neck completed showing proximal left M1 occlusion and what appears to be chronic right ICA occlusion. She was given TNK at 12:55. Life flighted to [facility] for MT pamelaal. NIH on arrival was 21 (breakdown below), BP on arrival was 130/93 with BG 93. Mechanical thrombectomy completed and post NIH score was:  Admitted to [facility].      Pt's  reports history of stroke in July of this year for which she originally had left sided weakness and facial droop that completely resolved and was back to her normal baseline prior to stroke. Was able to ambulate independently, but then fell in September and broke her right hip requiring surgical fixation. She was doing well, walking with walker for long distance but  recently has been ambulating independently.  reported no history of arrhythmias and reports pt was taking daily plavix.     Significant Events:  - 11/11: TNK and spontaneous recanalization on angiogram TICI 3  - 11/13: Had pressure-dependent exam, placed on Levo and Miguel. NIH 4  - 11/14: Repeat MRI, increased infarction size, keep -200    Interval Events:   11/15/24 Overnight: labile BP, BP 260s (off pressors) managed with labetalol. Start gabapentin 200mg BID for sympathetic tone    AM: Denies any pain or discomfort.     Objective   VITALS (24H):  Temp:  [36.4 °C (97.5 °F)-37 °C (98.6 °F)] 36.4 °C (97.5 °F)  Heart Rate:  [] 90  Resp:  [9-29] 17  BP: (134-214)/() 153/78  Arterial Line BP 1: (168-231)/() 208/60  INTAKE/OUTPUT:  Intake/Output Summary (Last 24 hours) at 11/15/2024 0723  Last data filed at 11/15/2024 0600  Gross per 24 hour   Intake 310 ml   Output 2784 ml   Net -2474 ml     VENT SETTINGS:        PHYSICAL EXAM:  NEURO:   - Alert, oriented x2-3, follows simple commands  - EOS, PERRL, EOMI, VFF  - NIHSS as below  CV:  - RRR on telemetry, NSR  - Unable to palpate or doppler pulse in both legs  RESP:  - No signs of resp distress  - Oxygen: Nasal cannula 4L  :  - External catheter in place  GI:  - Abdomen NT/ND, soft  SKIN:  - Intact    11/15/24 @ 8:00AM (SBP about 160)  NIHSS:   - Level of consciousness: 0 = Alert  - LOC Question: 0 = Both correct  - LOC Commands: 0 = Obeys both  - Best Gaze: 0 = Normal  - Visual Field: 0 = No visual loss  - Facial Paresis: 0 = Normal  - LUE: 0 = No drift; 10 sec  - RUE: 2 = Effort vs gravity  - LLE: 0 = No drift; 5 sec  - RLE: 2 = Effort vs gravity  - Limb Ataxia: 0 = Absent  - Sensory: 0 = Absent  - Best Language: 0 = No aphasia  - Dysarthria: 0 = Normal  - Neglect: 0 = None  TOTAL: 4       MEDICATIONS:  Scheduled: PRN: Continuous:   aspirin, 81 mg, oral, Daily  atorvastatin, 80 mg, oral, Nightly  azithromycin, 250 mg, oral, Once per day  on Monday Wednesday Friday  busPIRone, 10 mg, oral, TID  [Held by provider] clopidogrel, 75 mg, oral, Daily  pantoprazole, 40 mg, oral, Daily before breakfast   Or  esomeprazole, 40 mg, nasoduodenal tube, Daily before breakfast   Or  pantoprazole, 40 mg, intravenous, Daily before breakfast  tiotropium, 2 puff, inhalation, Daily   And  fluticasone furoate-vilanteroL, 1 puff, inhalation, Daily  gabapentin, 200 mg, oral, BID  heparin (porcine), 5,000 Units, subcutaneous, q8h  insulin lispro, 0-5 Units, subcutaneous, TID AC  levothyroxine, 25 mcg, oral, Daily  lidocaine, 1 patch, transdermal, Daily  melatonin, 3 mg, oral, Nightly  polyethylene glycol, 17 g, oral, Daily  potassium chloride CR, 40 mEq, oral, Once  potassium, sodium phosphates, 1 packet, oral, 4x daily  sennosides, 2 tablet, oral, BID     PRN medications: acetaminophen, alteplase, calcium gluconate, calcium gluconate, dextrose, dextrose, glucagon, glucagon, HYDROmorphone, ipratropium-albuteroL, magnesium sulfate, magnesium sulfate, ondansetron ODT **OR** ondansetron, oxygen, potassium chloride CR **OR** potassium chloride, potassium chloride CR **OR** potassium chloride, potassium chloride norepinephrine, 0-0.5 mcg/kg/min, Last Rate: Stopped (11/15/24 0412)  phenylephrine, 0-9 mcg/kg/min, Last Rate: Stopped (11/13/24 2021)  vasopressin, 0-0.03 Units/min, Last Rate: Stopped (11/14/24 1045)         LAB RESULTS:  Results from last 72 hours   Lab Units 11/15/24  0026 11/15/24  0025 11/15/24  0024 11/14/24  0216   GLUCOSE mg/dL 92  --   --  137*   SODIUM mmol/L 143  --   --  142   POTASSIUM mmol/L 3.1*  --   --  3.4*   CHLORIDE mmol/L 107  --   --  109*   CO2 mmol/L 27  --   --  21   ANION GAP mmol/L 12  --   --  15   BUN mg/dL 5*  --   --  4*   CREATININE mg/dL 0.32*  --   --  0.32*   EGFR mL/min/1.73m*2 >90  --   --  >90   CALCIUM mg/dL 8.1*  --   --  7.0*   PHOSPHORUS mg/dL 2.5  --   --  1.9*   ALBUMIN g/dL 3.5  --   --  3.5   MAGNESIUM mg/dL  --  2.26  --   1.73   POCT CALCIUM IONIZED (MMOL/L) IN BLOOD mmol/L  --   --  0.97* 1.01*      Results from last 72 hours   Lab Units 11/15/24  0025 11/14/24  0216   WBC AUTO x10*3/uL 9.1 9.2   NRBC AUTO /100 WBCs 0.0 0.0   RBC AUTO x10*6/uL 3.32* 3.29*   HEMOGLOBIN g/dL 9.9* 9.7*   HEMATOCRIT % 29.1* 28.7*   MCV fL 88 87   MCH pg 29.8 29.5   MCHC g/dL 34.0 33.8   RDW % 13.9 13.6   PLATELETS AUTO x10*3/uL 143* 129*   NEUTROS PCT AUTO % 72.7 73.5   IG PCT AUTO % 0.2 0.5   LYMPHS PCT AUTO % 12.4 14.7   MONOS PCT AUTO % 10.3 10.0   EOS PCT AUTO % 3.9 0.9   BASOS PCT AUTO % 0.5 0.4   NEUTROS ABS x10*3/uL 6.62* 6.76*   IG AUTO x10*3/uL 0.02 0.05   LYMPHS ABS AUTO x10*3/uL 1.13 1.35   MONOS ABS AUTO x10*3/uL 0.94* 0.92*   EOS ABS AUTO x10*3/uL 0.36 0.08   BASOS ABS AUTO x10*3/uL 0.05 0.04      Results from last 72 hours   Lab Units 11/15/24  0026 11/14/24  0216   ALBUMIN g/dL 3.5 3.5      IMAGING RESULTS:  MRI (11/14)  1. Evolving infarcts located within the left cerebral hemisphere,  including in the watershed distribution, overall more pronounced in  appearance but similar in extent with no hemorrhagic transformation  or surrounding mass effect.  2. Additional evolving acute infarcts are seen located within the  bilateral cerebral hemispheres and are stable to slightly more  pronounced in appearance. Few new punctate acute infarcts located  within the left cerebral hemisphere. There is no surrounding mass  effect or hemorrhagic transformation.    MRI (11/13)  1. Acute/subacute watershed ischemia throughout the left cerebral  hemisphere  2. Subcentimeter cortical infarctions involving the left superior  frontal lobe but also involving the left parietal and temporal lobes.  No evidence of hemorrhagic transformation.  3. Nonspecific white matter changes which can be seen in the setting  of chronic microangiopathy.    Reviewed OSH imaging on PACS  CT head wo IV contrast    Result Date: 11/12/2024  CTA neck:   Complete occlusion of the  right common carotid artery and right cervical internal carotid artery.   Coarse atherosclerotic calcification at the left carotid bifurcation with approximately 65% stenosis by NASCET criteria.   The vertebral arteries are patent. The left vertebral artery is dominant. There is a coarse calcification at the origin of the right vertebral artery.   Calcified and noncalcified plaque along the aortic arch and origins of the great vessels with narrowing.   CTA head:   There is an abrupt cutoff of the distal left anterior cerebral artery.   Occlusion of the right petrous and cavernous carotid arteries with reconstitution in the supraclinoid segment.   The left intracranial carotid artery is patent with coarse atherosclerotic calcification and mild narrowing.   The posterior circulation is patent without significant stenosis.   CT head:   No acute intracranial hemorrhage or mass effect.   I personally reviewed the images/study and I agree with the findings as stated by Simone Joy MD (Radiology Resident).   MACRO: Simone Joy discussed the significance and urgency of this critical finding by telephone with  Jeaneth Graves MD on 11/12/2024 at 5:59 am.  (**-RCF-**) Findings:  See findings.   .   Signed by: Latesha Zuniga 11/12/2024 7:20 AM Dictation workstation:   [id number]    CT angio head and neck w and wo IV contrast    Result Date: 11/12/2024  CTA neck:   Complete occlusion of the right common carotid artery and right cervical internal carotid artery.   Coarse atherosclerotic calcification at the left carotid bifurcation with approximately 65% stenosis by NASCET criteria.   The vertebral arteries are patent. The left vertebral artery is dominant. There is a coarse calcification at the origin of the right vertebral artery.   Calcified and noncalcified plaque along the aortic arch and origins of the great vessels with narrowing.   CTA head:   There is an abrupt cutoff of the distal left  anterior cerebral artery.   Occlusion of the right petrous and cavernous carotid arteries with reconstitution in the supraclinoid segment.   The left intracranial carotid artery is patent with coarse atherosclerotic calcification and mild narrowing.   The posterior circulation is patent without significant stenosis.   CT head:   No acute intracranial hemorrhage or mass effect.   I personally reviewed the images/study and I agree with the findings as stated by Simone Joy MD (Radiology Resident).   MACRO: Simone Joy discussed the significance and urgency of this critical finding by telephone with  Jeaneth Graves MD on 11/12/2024 at 5:59 am.  (**-RCF-**) Findings:  See findings.   .   Signed by: Latesha Zuniga 11/12/2024 7:20 AM Dictation workstation:   [id number]    CT head wo IV contrast    Result Date: 11/12/2024  No acute intracranial hemorrhage or mass effect.   Small focal area of encephalomalacia and gliosis within the right occipital lobe.   Mild white matter changes compatible with microangiopathy. Lacunar infarct within the right basal ganglia.   I personally reviewed the images/study and I agree with the findings as stated by Simone Joy MD (Radiology Resident).   MACRO: None   Signed by: Latesha Zuniga 11/12/2024 7:03 AM Dictation workstation:   [id number]       Assessment/Plan    Update 11/15/24  :: vEEG (final): indicative of left hemispheric structural lesion. No epileptiform discharges are noted seen  :: MRI (11/14): evolving infacts within L cerebral hemisphere, including in the watershed distribution, more pronounced but similar in extent transformation. Additional evolving acute infarcts within the bilateral cerebral hemispheres. Few new punctate acute infarcts located within the left cerebral hemisphere.   - Stoke following, per note: BP goal 180-200, c/w ASA and plavix, continue SCDsand Q8h subcutaneous heparin, ctn atorvastatin 80mg  - NSGY  following per note: OR Mon 11/18 for L CEA, hold plavix, continue ASA, SBP goal 180-200 per stroke, card res - RCRI 3, elevated risk but not prohibitive  - Cardiology signed off, per note: no cardiac contraindications to planned procedure, no further cardiac work up warranted    Dispo:  Stroke  PT/OT: PT - moderate intensity, OT - high intensity  - Contact vas Sx fellow over chat closer to discharge to coordinate follow up PAD with aortic occlusion  -----    NEURO:  # L MCA stroke S/P TNK, Spontaneous recanalization TICI 3  # R sided stroke with left sided deficit (per personal review of imaging L sided posterior limb of internal capsule) 7/2024  # Bilateral carotid artery stenosis S/P R CEA 2011  Assessment:  - Neurologically: see exam above  - Fluctuating exams at times  - LDL (11/11) 92, A1c (11/11) 5.8  - BNP (11/11) 188  - CTA HN (11/12): complete occlusion of R CCA and R cervical ICA. 65% stenosis of L   Carotid bifurcation, abrupt cutoff of the distal L DAK[place], occlusion of the right petrous and cavernous carotid arteries  - MRI Brain (11/12): Acute/subacute watershed ischemia throughout the left cerebral hemisphere, subcentimeter cortical infarctions upon the left superior frontal lobe, left parietal and temporal lobes  - CTA HN (11/13): Interval increased CT conspicuity, unchanged occlusion of the left DAKOTA callosal marginal branch, R CCA, R ICA, unchanged flow stenosis in L ICA, L CCA, R>L subclavian artery  - vEEG (final): indicative of left hemispheric structural lesion. No epileptiform discharges are noted seen  - MRI (11/14): evolving infacts within L cerebral hemisphere, including in the watershed distribution, more pronounced but similar in extent transformation. Additional evolving acute infarcts within the bilateral cerebral hemispheres. Few new punctate acute infarcts located within the left cerebral hemisphere.   Plan:  - NSU  - Neuro Checks: Q1H  - Sedation: None  - Pain: acetaminophen PRN, Q6H  -  Nausea: ondansetron  - PT/OT/SLP  - c/w home Atorvastatin 80mg  - c/w aspirin 81mg   - Hold plavix 75mg  - Stoke following, per note: BP goal 180-200, c/w ASA and plavix, continue SCDsand Q8h subcutaneous heparin, ctn atorvastatin 80mg  - NSGY following per note: OR Mon 11/18 for L CEA, hold plavix, continue ASA, SBP goal 180-200 per stroke, card res - RCRI 3, elevated risk but not prohibitive  - Cardiology signed off, per note: no cardiac contraindications to planned procedure, no further cardiac work up warranted    CARDIOVASCULAR:  # Severe PAD 2/2 Chronic infrarenal aortic occlusion  # HFpEF  # History of HTN, HLD  Assessment:  - EKG: pending  - ECHO 11/12/24: LVEF 65-70%, LVH, severely dilated LA, negative bubble study  - CT Aorti-ileofemoral w/ runoff: Extensive vascular disease including occlusion of the infrarenal abdominal aorta, bilateral common iliac arteries, no flow appreciated left PT   Plan:  - Continue to monitor on telemetry  - BP goal: 180-200  --> on Levo and Miguel, wean as tolerated  - c/w monitor doppler  - Vasc Sx signed off, per note: no acute intervention . Good flow to radial artery. Chronic infrarenal aortic occlusion. Continue to monitor doppler signals  - Could consider midodrine    RESPIRATORY:  # COPD  Assessment:  - PFT (10/05/2023 at OSH): FEV1 50% predicted. FEV1/FVC 69%. No TLC obtained. DLCO 40%   Plan:  - Continuous pulse oximetry   - O2 PRN to maintain SpO2 > 88%, wean as tolerated  - Incentive spirometry while awake  - Continue Trelegy Ellipta (change to inpatient formula)  - Wean O2, maintain SpO2 88-92%    RENAL/:  #JAIME, resolved  Assessment:  - Baseline BUN/Cr: 12/0.63  Results from last 72 hours   Lab Units 11/15/24  0026 11/14/24  0216   BUN mg/dL 5* 4*   CREATININE mg/dL 0.32* 0.32*       - likely from contrast, now resolved with renal function return to baseline  Plan:  - Monitor with daily RFP  - Maintain external urinary diversion device for strict I&O    FEN/GI:  # HCC  S/P partial hepatectomy (2019)  #Oral dysphagia  Assessment:  - Last BM: PTA  Plan:  - Monitor and replace electrolytes per protocol  - IVF: None  - Adult diet Cardiac; 70 gm fat; 2 - 3 grams Sodium; Minced and moist 5; Thin 0  - Bowel Regimen: Docusate-Senna BID and Miralax QD  - SLP following, per note: Minced and moist, thin liquid    ENDOCRINE:  # T2DM  # Hypothyroidism  Assessment:  Results from last 7 days   Lab Units 11/15/24  0405 11/15/24  0026 24  2306 24  1942 24  1649 24  1203 24  0931 24  0333 24  0216   POCT GLUCOSE mg/dL 85  --  112* 84 92 153* 147*   < >  --    GLUCOSE mg/dL  --  92  --   --   --   --   --   --  137*    < > = values in this interval not displayed.   - HbA1C: 6.3 2024   Plan:  - Accuchecks & ISS Q4H, plan to relax to Q6H  - Hold home metformin  - Continue home Levothyroxine  - Hypoglycemia monitoring     HEMATOLOGY:  #JOSE, possibly hemodilution  Assessment:  - Baseline Hgb: 11.6  - Baseline Plts: 258  Results from last 72 hours   Lab Units 11/15/24  0025 24  0216   WBC AUTO x10*3/uL 9.1 9.2   NRBC AUTO /100 WBCs 0.0 0.0   RBC AUTO x10*6/uL 3.32* 3.29*   HEMOGLOBIN g/dL 9.9* 9.7*   HEMATOCRIT % 29.1* 28.7*   MCV fL 88 87   MCH pg 29.8 29.5   MCHC g/dL 34.0 33.8   RDW % 13.9 13.6   PLATELETS AUTO x10*3/uL 143* 129*      Plan:  - Continue to monitor with daily CBC and Coag panel    INFECTIOUS DISEASE:  #JOSE  Assessment:  Results from last 7 days   Lab Units 11/15/24  0025 24  0216   WBC AUTO x10*3/uL 9.1 9.2    - Temp (24hrs), Av.6 °C (97.9 °F), Min:36.4 °C (97.5 °F), Max:37 °C (98.6 °F)     Plan:  - Continue to monitor for s/sx of infection  - Pan culture for temperature > 38.4 C    MUSCULOSKELETAL:  - No acute issues    SKIN:  - No acute issues  - Turns and skin care per NSU protocol    ACCESS:  - PIV    PROPHYLAXIS:  - DVT Ppx: SCDs and SQH  - GI Ppx: Pantoprazole    RESTRAINTS:  Not indicated/Patient does not meet criteria  for restraints    Lashanda Handley MD  Neuro Critical Care    The patient is critically ill with acute left MCA stroke  Neurologically worsened  Waxing and waning exam may be perfusion dependent: keep sbp>160  Plan for carotid endarterectomy 11/18  Secondary stroke prophylaxis per stroke team: Cont antiplatelet agents  Chronic diastolic heart failure  COPD: Cont inhalers  Diabetes: Cont BG control  Swallow evaluation     I have seen and examined the patient.  I have reviewed the patient's laboratory, radiographic, and clinical data.  I have spent 30 minutes providing critical care for the patient.       CARISSA Santo M.D.

## 2024-11-15 NOTE — PROGRESS NOTES
"Zulma Rao is a 79 y.o. female on day 4 of admission presenting with Cerebrovascular accident (CVA) due to occlusion of left middle cerebral artery (Multi).    Subjective   No acute events overnight. Plan for Carotid revascularization on 11/18/24. Will maintain SBP of 180-200 until then given pressure dependent examination with increased stroke burden on repeat MRI.        Objective   Visit Vitals  /77   Pulse 90   Temp 36.4 °C (97.5 °F)   Resp 15   Ht 1.626 m (5' 4\")   Wt 50.9 kg (112 lb 3.4 oz)   SpO2 96%   BMI 19.26 kg/m²   OB Status Menopausal   BSA 1.52 m²       Physical Exam  Vitals reviewed.   HENT:      Head: Normocephalic and atraumatic.      Mouth/Throat:      Mouth: Mucous membranes are moist.   Eyes:      Extraocular Movements: Extraocular movements intact.   Cardiovascular:      Rate and Rhythm: Normal rate and regular rhythm.      Heart sounds: No murmur heard.  Pulmonary:      Effort: Pulmonary effort is normal.      Breath sounds: Normal breath sounds. No wheezing.   Neurological:      Mental Status: She is alert.       Neurological Exam  Mental Status  Alert. Oriented to person, place, time and situation. Expressive aphasia present. Able to name objects.    Cranial Nerves  CN II: Visual fields intact bilaterally in all quadrants to threat - closes eyes to threat on exam.  CN III, IV, VI: Extraocular movements intact bilaterally.   Right pupil: Round. Reactive to light. Reactive to accommodation.   Left pupil: Round. Reactive to light. Reactive to accommodation.  CN V: Facial sensation is normal.  CN VII: Full and symmetric facial movement.  CN VIII: Hearing grossly intact to conversation.  CN IX, X: Palate elevates symmetrically  CN XI:  Right: Sternocleidomastoid strength is normal. Trapezius strength is weak.  Left: Sternocleidomastoid strength is normal. Trapezius strength is normal.  CN XII: Tongue midline without atrophy or fasciculations.    Motor   No fasciculations " present.  Moves all extremities. Strength appears somewhat pain limited.  LUE: 4/5   LLE: 4/5  RUE strong  but unable to lift antigravity, RLE no effort against gravity,.    Sensory  Sensory  Extremity sensation grossly intact and symmetric to light touch bilaterally.   .    Coordination  Left: Finger-to-nose normal.  R finger to nose limited by pain, unable to assess.      NIHSS    1a  Level of consciousness: 0=alert; keenly responsive   1b. LOC questions:  0=Performs both tasks correctly   1c. LOC commands: 0=Performs both tasks correctly   2.  Best Gaze: 0=normal   3. Visual: 0=No visual loss   4. Facial Palsy: 0=Normal symmetric movement   5a. Motor Left Arm: 0=No drift, limb holds 90 (or 45) degrees for full 10 seconds   5b.  Motor Right Arm: 2=Some effort against gravity, limb cannot get to or maintain (if cured) 90 (or 45) degrees, drifts down to bed, but has some effort against gravity   6a. Motor Left Le=No drift, limb holds 90 (or 45) degrees for full 10 seconds   6b  Motor Right Leg:  3=No effort against gravity, limb falls   7. Limb Ataxia: 0=Absent   8.  Sensory: 0=Normal; no sensory loss   9. Best Language:  1=Mild to moderate aphasia; some obvious loss of fluency or facility of comprehension without significant limitation on ideas expressed or form of expression.   10. Dysarthria: 1=Mild to moderate, patient slurs at least some words and at worst, can be understood with some difficulty   11. Extinction and Inattention: 0=No abnormality          Total:   7       Relevant Results    MEDICATIONS:  Scheduled medications  aspirin, 81 mg, oral, Daily  atorvastatin, 80 mg, oral, Nightly  azithromycin, 250 mg, oral, Once per day on   busPIRone, 10 mg, oral, TID  [Held by provider] clopidogrel, 75 mg, oral, Daily  pantoprazole, 40 mg, oral, Daily before breakfast   Or  esomeprazole, 40 mg, nasoduodenal tube, Daily before breakfast   Or  pantoprazole, 40 mg, intravenous, Daily  before breakfast  tiotropium, 2 puff, inhalation, Daily   And  fluticasone furoate-vilanteroL, 1 puff, inhalation, Daily  gabapentin, 200 mg, oral, BID  heparin (porcine), 5,000 Units, subcutaneous, q8h  insulin lispro, 0-5 Units, subcutaneous, TID AC  levothyroxine, 25 mcg, oral, Daily  lidocaine, 1 patch, transdermal, Daily  melatonin, 3 mg, oral, Nightly  polyethylene glycol, 17 g, oral, Daily  potassium chloride CR, 40 mEq, oral, Once  potassium, sodium phosphates, 1 packet, oral, 4x daily  sennosides, 2 tablet, oral, BID      Continuous medications  norepinephrine, 0-0.5 mcg/kg/min, Last Rate: 0.03 mcg/kg/min (11/15/24 0800)  phenylephrine, 0-9 mcg/kg/min, Last Rate: Stopped (11/13/24 2021)  vasopressin, 0-0.03 Units/min, Last Rate: Stopped (11/14/24 1045)      PRN medications  PRN medications: acetaminophen, alteplase, calcium gluconate, calcium gluconate, dextrose, dextrose, glucagon, glucagon, HYDROmorphone, ipratropium-albuteroL, magnesium sulfate, magnesium sulfate, ondansetron ODT **OR** ondansetron, oxygen, potassium chloride CR **OR** potassium chloride, potassium chloride CR **OR** potassium chloride, potassium chloride     LABS:  Results for orders placed or performed during the hospital encounter of 11/11/24 (from the past 24 hours)   POCT GLUCOSE   Result Value Ref Range    POCT Glucose 147 (H) 74 - 99 mg/dL   ECG 12 Lead   Result Value Ref Range    Ventricular Rate 94 BPM    Atrial Rate 94 BPM    AZ Interval 144 ms    QRS Duration 78 ms    QT Interval 390 ms    QTC Calculation(Bazett) 487 ms    P Axis 86 degrees    R Axis 41 degrees    T Axis 31 degrees    QRS Count 15 beats    Q Onset 218 ms    P Onset 146 ms    P Offset 202 ms    T Offset 413 ms    QTC Fredericia 453 ms   Type and screen   Result Value Ref Range    ABO TYPE A     Rh TYPE POS     ANTIBODY SCREEN NEG    Urinalysis with Reflex Microscopic   Result Value Ref Range    Color, Urine Colorless (N) Light-Yellow, Yellow, Dark-Yellow     Appearance, Urine Clear Clear    Specific Gravity, Urine 1.014 1.005 - 1.035    pH, Urine 5.5 5.0, 5.5, 6.0, 6.5, 7.0, 7.5, 8.0    Protein, Urine NEGATIVE NEGATIVE, 10 (TRACE), 20 (TRACE) mg/dL    Glucose, Urine Normal Normal mg/dL    Blood, Urine NEGATIVE NEGATIVE    Ketones, Urine 80 (3+) (A) NEGATIVE mg/dL    Bilirubin, Urine NEGATIVE NEGATIVE    Urobilinogen, Urine Normal Normal mg/dL    Nitrite, Urine NEGATIVE NEGATIVE    Leukocyte Esterase, Urine NEGATIVE NEGATIVE   POCT GLUCOSE   Result Value Ref Range    POCT Glucose 153 (H) 74 - 99 mg/dL   POCT GLUCOSE   Result Value Ref Range    POCT Glucose 92 74 - 99 mg/dL   POCT GLUCOSE   Result Value Ref Range    POCT Glucose 84 74 - 99 mg/dL   POCT GLUCOSE   Result Value Ref Range    POCT Glucose 112 (H) 74 - 99 mg/dL   Calcium, ionized   Result Value Ref Range    POCT Calcium, Ionized 0.97 (L) 1.1 - 1.33 mmol/L   Magnesium   Result Value Ref Range    Magnesium 2.26 1.60 - 2.40 mg/dL   CBC and Auto Differential   Result Value Ref Range    WBC 9.1 4.4 - 11.3 x10*3/uL    nRBC 0.0 0.0 - 0.0 /100 WBCs    RBC 3.32 (L) 4.00 - 5.20 x10*6/uL    Hemoglobin 9.9 (L) 12.0 - 16.0 g/dL    Hematocrit 29.1 (L) 36.0 - 46.0 %    MCV 88 80 - 100 fL    MCH 29.8 26.0 - 34.0 pg    MCHC 34.0 32.0 - 36.0 g/dL    RDW 13.9 11.5 - 14.5 %    Platelets 143 (L) 150 - 450 x10*3/uL    Neutrophils % 72.7 40.0 - 80.0 %    Immature Granulocytes %, Automated 0.2 0.0 - 0.9 %    Lymphocytes % 12.4 13.0 - 44.0 %    Monocytes % 10.3 2.0 - 10.0 %    Eosinophils % 3.9 0.0 - 6.0 %    Basophils % 0.5 0.0 - 2.0 %    Neutrophils Absolute 6.62 (H) 1.60 - 5.50 x10*3/uL    Immature Granulocytes Absolute, Automated 0.02 0.00 - 0.50 x10*3/uL    Lymphocytes Absolute 1.13 0.80 - 3.00 x10*3/uL    Monocytes Absolute 0.94 (H) 0.05 - 0.80 x10*3/uL    Eosinophils Absolute 0.36 0.00 - 0.40 x10*3/uL    Basophils Absolute 0.05 0.00 - 0.10 x10*3/uL   Renal Function Panel   Result Value Ref Range    Glucose 92 74 - 99 mg/dL     Sodium 143 136 - 145 mmol/L    Potassium 3.1 (L) 3.5 - 5.3 mmol/L    Chloride 107 98 - 107 mmol/L    Bicarbonate 27 21 - 32 mmol/L    Anion Gap 12 10 - 20 mmol/L    Urea Nitrogen 5 (L) 6 - 23 mg/dL    Creatinine 0.32 (L) 0.50 - 1.05 mg/dL    eGFR >90 >60 mL/min/1.73m*2    Calcium 8.1 (L) 8.6 - 10.6 mg/dL    Phosphorus 2.5 2.5 - 4.9 mg/dL    Albumin 3.5 3.4 - 5.0 g/dL   POCT GLUCOSE   Result Value Ref Range    POCT Glucose 85 74 - 99 mg/dL   POCT GLUCOSE   Result Value Ref Range    POCT Glucose 102 (H) 74 - 99 mg/dL     IMAGING:  MR brain wo IV contrast  Result Date: 11/13/2024 1:26 am     FINDINGS:   CSF Spaces: The ventricles, sulci and basal cisterns are within normal limits for patient's age.     Parenchyma: Watershed distribution areas of T2/FLAIR hyperintense signal with associated diffusion restriction throughout the left cerebral hemisphere most pronounced in the superior left frontal lobe but also involving the parietal and medial temporal lobes. Finding is consistent with acute/subacute watershed infarction.   Cortical foci of diffusion restriction in the left frontal lobe cortex along the interhemispheric aspect, in the left sylvian fissure and posterior left insula and in the left posterior temporal lobe. These findings are consistent with non watershed distribution   2.5 cm focus of encephalomalacia within the right occipital lobe consistent with previous infarction in the distribution of the right posterior cerebral artery. Focus of subacute/chronic lacunar infarction in the right thalamus measuring a proximally 5 mm x 15 mm in size. Focus of susceptibility artifact within the left basal ganglia favored to correspond to mineralization seen on prior CT. Nonspecific patchy T2/FLAIR hyperintense signal within the periventricular and subcortical white matter, likely sequela of chronic microangiopathy.   There is no mass effect or midline shift.       Paranasal Sinuses and Mastoids: Visualized paranasal  sinuses and mastoid air cells are unremarkable.   Note is made of postsurgical changes from bilateral lens replacements.       Impression  1. Acute/subacute watershed ischemia throughout the left cerebral hemisphere   2. Subcentimeter cortical infarctions involving the left superior frontal lobe but also involving the left parietal and temporal lobes. No evidence of hemorrhagic transformation.   3. Nonspecific white matter changes which can be seen in the setting of chronic microangiopathy.       Transthoracic Echo (TTE) Complete  11/12/2024    PHYSICIAN INTERPRETATION:   Left Ventricle: Left ventricular ejection fraction is normal, by visual estimate at 65-70%. There are no regional left ventricular wall motion abnormalities. The left ventricular cavity size is decreased. There is mildly increased septal and mildly increased posterior left ventricular wall thickness. There is left ventricular concentric remodeling. Spectral Doppler shows an abnormal pattern of left ventricular diastolic filling. There is no definite left ventricular thrombus visualized.   Left Atrium: The left atrium is severely dilated. A bubble study using agitated saline was performed. Bubble study is negative. Agitated saline contrast study was negative for intracardiac shunt. Right Ventricle: The right ventricle is normal in size. There is normal right ventricular global systolic function.   Right Atrium: The right atrium is normal in size.   Aortic Valve: The aortic valve is trileaflet. There is no evidence of aortic valve regurgitation. The peak instantaneous gradient of the aortic valve is 8 mmHg.   Mitral Valve: The mitral valve is normal in structure. The peak instantaneous gradient of the mitral valve is 12 mmHg. There is trace to mild mitral valve regurgitation.   Tricuspid Valve: The tricuspid valve is structurally normal. There is trace tricuspid regurgitation. The right ventricular systolic pressure is unable to be estimated.    Pulmonic Valve: The pulmonic valve is structurally normal. There is physiologic pulmonic valve regurgitation.   Pericardium: Trivial pericardial effusion.   Aorta: The aortic root is normal. The aortic root appears normal in size and measures 2.90 cm.  Systemic Veins: The inferior vena cava appears normal in size, with IVC inspiratory collapse greater than 50%. In comparison to the previous echocardiogram(s): There are no prior studies on this patient for comparison purposes. No prior echocardiogram available for comparison.    CONCLUSIONS:    1. Left ventricular ejection fraction is normal, by visual estimate at 65-70%.    2. Spectral Doppler shows an abnormal pattern of left ventricular diastolic filling.    3. Left ventricular cavity size is decreased.    4. No left ventricular thrombus visualized.    5. There is normal right ventricular global systolic function.    6. The left atrium is severely dilated.    7. Agitated saline contrast study was negative for intracardiac shunt.    8. Normal aortic root.    9. No prior echocardiogram available for comparison.     CT ANGIO AORTA AND BILATERAL ILIOFEMORAL RUN OFF INCLUDING WITHOUT  CONTRAST 11/12/2024  IMPRESSION:  1. Extensive vascular disease including occlusion of the infrarenal  abdominal aorta, bilateral common iliac arteries. Additionally, no  appreciated flow is noted in the left posterior tibial artery  distally. Please refer to the detailed description above.  2. Other nonspecific finding as detailed above.    CT head wo IV contrast; CT angio head and neck w and wo IV contrast  11/12/2024 5:34 am     FINDINGS: NON-CONTRAST HEAD CT:     BRAIN PARENCHYMA:  No evidence of acute intraparenchymal hemorrhage or parenchymal evidence of an acute large territory ischemic infarct. No mass-effect, midline shift or effacement of cerebral sulci. Gray-white matter distinction is preserved. Punctate calcifications within the left-greater-than-right basal ganglia.  Lacunar within the posterior limb of the right internal capsule and left basal ganglia. Small amount of encephalomalacia and gliosis within the right occipital lobe.     VENTRICLES and EXTRA-AXIAL SPACES:  No acute extra-axial or intraventricular hemorrhage. Ventricles and sulci are age-concordant.     PARANASAL SINUSES/MASTOIDS:  No hemorrhage or air-fluid levels within the visualized paranasal sinuses. The mastoids are well aerated.     CALVARIUM/ORBITS:  No skull fracture.  The orbits and globes are intact to the extent visualized.     EXTRACRANIAL SOFT TISSUES: No discernible abnormality.         CTA NECK:   There is calcified and noncalcified plaque along the aortic arch and origins of the great vessels. There is marked narrowing at the origin of the right common carotid artery and right subclavian artery. There is moderate narrowing secondary to noncalcified plaque at the origin of the left subclavian artery.     LEFT VERTEBRAL ARTERY:  No hemodynamically significant stenosis, occlusion, or dissection. The left vertebral artery is dominant.     LEFT COMMON/INTERNAL CAROTID ARTERY:  There is calcified plaque along the course of the common carotid artery with mild-to-moderate narrowing. There is coarse calcification at the carotid bifurcation and along the proximal cervical internal carotid artery with a proximally 65% stenosis by NASCET criteria. The cervical internal carotid artery is patent to the level of the skull base without additional stenosis.     RIGHT VERTEBRAL ARTERY: There is coarse atherosclerotic calcification at the origin of the right vertebral artery. No hemodynamically significant stenosis, occlusion, or dissection.     RIGHT COMMON/INTERNAL CAROTID ARTERY:  There is occlusion of the right common carotid artery just beyond the origin. There is occlusion of the right cervical internal carotid artery.   Limited images through the lung apices demonstrate emphysematous changes. Mild degenerative  changes of the cervical spine without evidence of high-grade spinal canal stenosis. Soft tissues of the neck are unremarkable.            CTA HEAD:     ANTERIOR CIRCULATION: No aneurysm.    - Internal Carotid Arteries: There is occlusion of the right petrous and cavernous internal carotid artery. There is reconstitution within the supraclinoid segment. The left intracranial carotid artery is patent. There is atherosclerotic calcification along the cavernous and supraclinoid ICA with mild narrowing.     - Middle Cerebral Arteries: No hemodynamically significant stenosis or occlusion.     - Anterior Cerebral Arteries: There is and abrupt cutoff of the distal left anterior cerebral artery (series 504, image 82). The right D[place] is without hemodynamically significant stenosis or occlusion.       POSTERIOR CIRCULATION: No aneurysm.     - Intracranial Vertebral Arteries:  No hemodynamically significant stenosis or occlusion.     - Basilar Artery:  No hemodynamically significant stenosis or occlusion.     - Posterior Cerebral Arteries:  No hemodynamically significant stenosis or occlusion.        CTA NECK:     Complete occlusion of the right common carotid artery and right cervical internal carotid artery.   Coarse atherosclerotic calcification at the left carotid bifurcation with approximately 65% stenosis by NASCET criteria.   The vertebral arteries are patent. The left vertebral artery is dominant. There is a coarse calcification at the origin of the right vertebral artery.   Calcified and noncalcified plaque along the aortic arch and origins of the great vessels with narrowing.     CTA head:   There is an abrupt cutoff of the distal left anterior cerebral artery.   Occlusion of the right petrous and cavernous carotid arteries with reconstitution in the supraclinoid segment.   The left intracranial carotid artery is patent with coarse atherosclerotic calcification and mild narrowing.   The posterior circulation is patent  without significant stenosis.        CT HEAD:   No acute intracranial hemorrhage or mass effect.     ---     CT head wo IV contrast 11/11/2024 9:03 pm     FINDINGS:   CSF Spaces: Ventricles, cortical sulci and basal cisterns are prominent reflecting age related involutional changes and mild volume loss. Mildly prominent extra-axial CSF space within the bifrontal region may represent asymmetric volume loss. There is no extraaxial fluid collection.     Parenchyma: There is no acute intraparenchymal hemorrhage or parenchymal evidence of acute large territorial ischemic infarction. Patchy white matter hypodensity likely represents microangiopathy. Lucency within the right basal ganglia likely represents a lacunar infarct. Small area of encephalomalacia and gliosis within the right occipital lobe. Of note multiple intracranial vessels are hyperdense, likely secondary to recent IR neuro angiogram. The grey-white differentiation is intact. There is no mass effect or midline shift.     Calvarium: The calvarium is unremarkable.     Paranasal sinuses and mastoids: Visualized paranasal sinuses and mastoids are clear.        No acute intracranial hemorrhage or mass effect.   Small focal area of encephalomalacia and gliosis within the right occipital lobe.   Mild white matter changes compatible with microangiopathy. Lacunar infarct within the right basal ganglia.              Assessment/Plan   This patient currently has cardiac telemetry ordered; if you would like to modify or discontinue the telemetry order, click here to go to the orders activity to modify/discontinue the order.  Assessment & Plan  Cerebrovascular accident (CVA) due to occlusion of left middle cerebral artery (Multi)    Ms. Rao is an 80 yo female with history of stroke in 7/2024 HTN, HLD, R CEA 2011, HFpEF, COPD, anxiety, partial hepatectomy for HCC 2019, mRS 0,  transferred from OSH for acute left MCA infarct with NIH 21, s/p TNK. MT performed, but  patient's left had already recanalized with TNK (therefore cannot use Tici Scoring). Thrombectomy complicated by severe diffuse atherosclerosis of vasculature, requiring multiple entry attempts/passes. Access gained via right radial artery. NIH 20 s/p thrombectomy with RUE/RLE antigravity, improved from prior. Admitting to NSU for continued monitoring. Spontaneous recanalization s/p TNK, Notable NIHSS decrease from initial score of 21 demonstrates marked improvement from time of presentation to [facility]. Initial SBP goal <180, however the patient has had BP dependant examinations, reportedly revealing deterioration below -180 by several NSU clinicians 11/11-12. SBP goals have been set at 180-200, maintained with vasopressors; plan to decrease to 170-180 as tolerated today.  Given permissive HTN and increase in SBP goals with use of vasopressors, ctm for intracranial hemorrhage, remain NSU status. Patient's  clarified she has been on DAPT since 07/2024, rec continuation of aspirin and initiation of clopidogrel based on MRI Head w/o contrast findings. Given the approximately 65% stenosis noted proximal to the left carotid bifurcation, plan for potential L carotid artery stenting within 2 weeks iso complete occlusion of the right common carotid artery and right cervical internal carotid artery, NSGY consulted with plans for carotid revascularization.      Type: Ischemic stroke  Subtype/etiology: Artery to artery  Vessels involved: left MCA  Neurological manifestations:  NIHSS (worst at presentation): 21   Antiplatelet/antithrombotic plan for stroke prevention: Aspirin, NEW recommendation to start clopidogrel  VTE prophylaxis: SCDs, Heparin 5000 units Q8hr     Vascular Risk Factor modification goals:  Blood pressure goals: avoid hypotension SBP <100 that could worsen cerebral perfusion, Ischemic stroke post-thrombectomy   Lipid Goals: education on healthy diet and statin therapy to maintain or achieve goal  LDL-cholesterol < 70mg  Glucose Goals: early treatment of hyperglycemia to goal glucose 140-180 mg/dl with long-term goal A1c < 7%   Smoking Cessation and Education  Assessment for Rehabilitation needs   Patient and family education on signs and symptoms of stroke, calling 911, healthy strategies for stroke prevention.         # Acute Left MCA infarct s/p TNK (11/11 @ 1255) and MT (already recanalized- no Tici scoring)  : : LDL 92; A1c 5.8%  : : NIHSS 21 at time of transfer to [facility]--> 6 this morning.     : : MRI brain w/o contrast with L cerebral hemisphere acute-subacute ischemia; subcentimeter cortical infarcts of L superior frontal, parietal, annd temporal lobes. Stroke burden increased in size within the same distribution as prior.  : : CTA with atherosclerotic calcification at the left carotid bifurcation with approximately 65% stenosis by NASCET criteria  : : TTE: Bubble study negative. LVEF 65-70%. No thrombus. Severe LA dilatation.   :: cvEEG with no epileptiform activity.    Recommendations & Plan:  - Q1 neuro checks, remain NSU status for vasopressor support  - BP goal 180-200 until carotid revascularization  - ASA  - Hold Plavix for upcoming carotid revascularization  - DVT Ppx: continue SCDs, ctn Q8hr subcutaneous heparin   - Ctn atorvastatin 80mg  - NSGY consulted for carotid revascularization      Corey Domingo DO  Neurology, PGY-2   present

## 2024-11-15 NOTE — PROGRESS NOTES
Social Work Discharge Planning note:    -Patient discussed during interdisciplinary rounds.   -Team members present: Fellow, PT and OT, and SW  -Plan per medical team: Pt is not medically ready for discharge. OR planning for carotid endarterectomy on Monday.   -Payer: [employer]  -Status: Inpatient  -Discharge disposition: [facility] is following. Pt's daughter gave three SNF choices, [facility], [facility], and [facility] (referrals sent via [facility]).   -Support to Pt/family: SW called and updated Alecia. No new issues or concerns reported.   -Potential Barriers: none  -Anticipated Date of Discharge:  11/19/24    Wily Pineda MSW, LSW

## 2024-11-16 LAB
ABO GROUP (TYPE) IN BLOOD: NORMAL
ALBUMIN SERPL BCP-MCNC: 3.4 G/DL (ref 3.4–5)
ANION GAP SERPL CALC-SCNC: 13 MMOL/L (ref 10–20)
BASOPHILS # BLD AUTO: 0.07 X10*3/UL (ref 0–0.1)
BASOPHILS NFR BLD AUTO: 0.8 %
BUN SERPL-MCNC: 8 MG/DL (ref 6–23)
CA-I BLD-SCNC: 1.14 MMOL/L (ref 1.1–1.33)
CALCIUM SERPL-MCNC: 8.3 MG/DL (ref 8.6–10.6)
CHLORIDE SERPL-SCNC: 106 MMOL/L (ref 98–107)
CO2 SERPL-SCNC: 26 MMOL/L (ref 21–32)
CREAT SERPL-MCNC: 0.39 MG/DL (ref 0.5–1.05)
EGFRCR SERPLBLD CKD-EPI 2021: >90 ML/MIN/1.73M*2
EOSINOPHIL # BLD AUTO: 0.61 X10*3/UL (ref 0–0.4)
EOSINOPHIL NFR BLD AUTO: 6.8 %
ERYTHROCYTE [DISTWIDTH] IN BLOOD BY AUTOMATED COUNT: 14 % (ref 11.5–14.5)
GLUCOSE BLD MANUAL STRIP-MCNC: 105 MG/DL (ref 74–99)
GLUCOSE BLD MANUAL STRIP-MCNC: 110 MG/DL (ref 74–99)
GLUCOSE BLD MANUAL STRIP-MCNC: 111 MG/DL (ref 74–99)
GLUCOSE BLD MANUAL STRIP-MCNC: 151 MG/DL (ref 74–99)
GLUCOSE BLD MANUAL STRIP-MCNC: 170 MG/DL (ref 74–99)
GLUCOSE SERPL-MCNC: 100 MG/DL (ref 74–99)
HCT VFR BLD AUTO: 31.4 % (ref 36–46)
HGB BLD-MCNC: 10.2 G/DL (ref 12–16)
IMM GRANULOCYTES # BLD AUTO: 0.02 X10*3/UL (ref 0–0.5)
IMM GRANULOCYTES NFR BLD AUTO: 0.2 % (ref 0–0.9)
LYMPHOCYTES # BLD AUTO: 1.4 X10*3/UL (ref 0.8–3)
LYMPHOCYTES NFR BLD AUTO: 15.5 %
MAGNESIUM SERPL-MCNC: 2.11 MG/DL (ref 1.6–2.4)
MCH RBC QN AUTO: 29.7 PG (ref 26–34)
MCHC RBC AUTO-ENTMCNC: 32.5 G/DL (ref 32–36)
MCV RBC AUTO: 91 FL (ref 80–100)
MONOCYTES # BLD AUTO: 1.08 X10*3/UL (ref 0.05–0.8)
MONOCYTES NFR BLD AUTO: 12 %
NEUTROPHILS # BLD AUTO: 5.84 X10*3/UL (ref 1.6–5.5)
NEUTROPHILS NFR BLD AUTO: 64.7 %
NRBC BLD-RTO: 0 /100 WBCS (ref 0–0)
PHOSPHATE SERPL-MCNC: 3.3 MG/DL (ref 2.5–4.9)
PLATELET # BLD AUTO: 186 X10*3/UL (ref 150–450)
POTASSIUM SERPL-SCNC: 3.3 MMOL/L (ref 3.5–5.3)
RBC # BLD AUTO: 3.44 X10*6/UL (ref 4–5.2)
RH FACTOR (ANTIGEN D): NORMAL
SODIUM SERPL-SCNC: 142 MMOL/L (ref 136–145)
WBC # BLD AUTO: 9 X10*3/UL (ref 4.4–11.3)

## 2024-11-16 PROCEDURE — 80069 RENAL FUNCTION PANEL: CPT

## 2024-11-16 PROCEDURE — 2500000004 HC RX 250 GENERAL PHARMACY W/ HCPCS (ALT 636 FOR OP/ED): Performed by: STUDENT IN AN ORGANIZED HEALTH CARE EDUCATION/TRAINING PROGRAM

## 2024-11-16 PROCEDURE — 2500000005 HC RX 250 GENERAL PHARMACY W/O HCPCS

## 2024-11-16 PROCEDURE — 83735 ASSAY OF MAGNESIUM: CPT

## 2024-11-16 PROCEDURE — 37799 UNLISTED PX VASCULAR SURGERY: CPT

## 2024-11-16 PROCEDURE — 82947 ASSAY GLUCOSE BLOOD QUANT: CPT

## 2024-11-16 PROCEDURE — 2500000001 HC RX 250 WO HCPCS SELF ADMINISTERED DRUGS (ALT 637 FOR MEDICARE OP): Performed by: STUDENT IN AN ORGANIZED HEALTH CARE EDUCATION/TRAINING PROGRAM

## 2024-11-16 PROCEDURE — 99233 SBSQ HOSP IP/OBS HIGH 50: CPT | Performed by: STUDENT IN AN ORGANIZED HEALTH CARE EDUCATION/TRAINING PROGRAM

## 2024-11-16 PROCEDURE — 82330 ASSAY OF CALCIUM: CPT

## 2024-11-16 PROCEDURE — 2500000005 HC RX 250 GENERAL PHARMACY W/O HCPCS: Performed by: REGISTERED NURSE

## 2024-11-16 PROCEDURE — 85025 COMPLETE CBC W/AUTO DIFF WBC: CPT

## 2024-11-16 PROCEDURE — 2500000001 HC RX 250 WO HCPCS SELF ADMINISTERED DRUGS (ALT 637 FOR MEDICARE OP)

## 2024-11-16 PROCEDURE — 94640 AIRWAY INHALATION TREATMENT: CPT

## 2024-11-16 PROCEDURE — 2020000001 HC ICU ROOM DAILY

## 2024-11-16 PROCEDURE — 99291 CRITICAL CARE FIRST HOUR: CPT | Performed by: NEUROLOGICAL SURGERY

## 2024-11-16 PROCEDURE — 2500000004 HC RX 250 GENERAL PHARMACY W/ HCPCS (ALT 636 FOR OP/ED)

## 2024-11-16 PROCEDURE — 2500000005 HC RX 250 GENERAL PHARMACY W/O HCPCS: Performed by: STUDENT IN AN ORGANIZED HEALTH CARE EDUCATION/TRAINING PROGRAM

## 2024-11-16 RX ORDER — POTASSIUM CHLORIDE 1.5 G/1.58G
40 POWDER, FOR SOLUTION ORAL ONCE
Status: COMPLETED | OUTPATIENT
Start: 2024-11-16 | End: 2024-11-16

## 2024-11-16 RX ADMIN — MELATONIN 3 MG: 3 TAB ORAL at 20:05

## 2024-11-16 RX ADMIN — MIDODRINE HYDROCHLORIDE 5 MG: 5 TABLET ORAL at 18:50

## 2024-11-16 RX ADMIN — HEPARIN SODIUM 5000 UNITS: 5000 INJECTION INTRAVENOUS; SUBCUTANEOUS at 08:17

## 2024-11-16 RX ADMIN — LIDOCAINE 1 PATCH: 4 PATCH TOPICAL at 08:17

## 2024-11-16 RX ADMIN — BUSPIRONE HYDROCHLORIDE 10 MG: 10 TABLET ORAL at 08:16

## 2024-11-16 RX ADMIN — BUSPIRONE HYDROCHLORIDE 10 MG: 10 TABLET ORAL at 20:05

## 2024-11-16 RX ADMIN — SODIUM CHLORIDE, POTASSIUM CHLORIDE, SODIUM LACTATE AND CALCIUM CHLORIDE 500 ML: 600; 310; 30; 20 INJECTION, SOLUTION INTRAVENOUS at 20:05

## 2024-11-16 RX ADMIN — MIDODRINE HYDROCHLORIDE 5 MG: 5 TABLET ORAL at 08:16

## 2024-11-16 RX ADMIN — LEVOTHYROXINE SODIUM 25 MCG: 25 TABLET ORAL at 06:04

## 2024-11-16 RX ADMIN — SENNOSIDES 17.2 MG: 8.6 TABLET, FILM COATED ORAL at 08:16

## 2024-11-16 RX ADMIN — BUSPIRONE HYDROCHLORIDE 10 MG: 10 TABLET ORAL at 15:55

## 2024-11-16 RX ADMIN — TIOTROPIUM BROMIDE INHALATION SPRAY 2 PUFF: 3.12 SPRAY, METERED RESPIRATORY (INHALATION) at 08:13

## 2024-11-16 RX ADMIN — HEPARIN SODIUM 5000 UNITS: 5000 INJECTION INTRAVENOUS; SUBCUTANEOUS at 01:21

## 2024-11-16 RX ADMIN — PANTOPRAZOLE SODIUM 40 MG: 40 INJECTION, POWDER, FOR SOLUTION INTRAVENOUS at 06:05

## 2024-11-16 RX ADMIN — MIDODRINE HYDROCHLORIDE 5 MG: 5 TABLET ORAL at 12:17

## 2024-11-16 RX ADMIN — ATORVASTATIN CALCIUM 80 MG: 80 TABLET, FILM COATED ORAL at 20:05

## 2024-11-16 RX ADMIN — FLUTICASONE FUROATE AND VILANTEROL TRIFENATATE 1 PUFF: 100; 25 POWDER RESPIRATORY (INHALATION) at 08:13

## 2024-11-16 RX ADMIN — ASPIRIN 81 MG CHEWABLE TABLET 81 MG: 81 TABLET CHEWABLE at 20:05

## 2024-11-16 RX ADMIN — SODIUM CHLORIDE 1000 ML: 9 INJECTION, SOLUTION INTRAVENOUS at 06:04

## 2024-11-16 RX ADMIN — POTASSIUM CHLORIDE 40 MEQ: 1.5 POWDER, FOR SOLUTION ORAL at 06:04

## 2024-11-16 RX ADMIN — HEPARIN SODIUM 5000 UNITS: 5000 INJECTION INTRAVENOUS; SUBCUTANEOUS at 15:55

## 2024-11-16 RX ADMIN — POLYETHYLENE GLYCOL 3350 17 G: 17 POWDER, FOR SOLUTION ORAL at 08:16

## 2024-11-16 RX ADMIN — SENNOSIDES 17.2 MG: 8.6 TABLET, FILM COATED ORAL at 20:05

## 2024-11-16 RX ADMIN — NOREPINEPHRINE BITARTRATE 0.11 MCG/KG/MIN: 0.06 INJECTION, SOLUTION INTRAVENOUS at 05:31

## 2024-11-16 ASSESSMENT — PAIN - FUNCTIONAL ASSESSMENT
PAIN_FUNCTIONAL_ASSESSMENT: 0-10

## 2024-11-16 ASSESSMENT — COGNITIVE AND FUNCTIONAL STATUS - GENERAL
TURNING FROM BACK TO SIDE WHILE IN FLAT BAD: A LOT
HELP NEEDED FOR BATHING: A LOT
TOILETING: A LOT
DRESSING REGULAR LOWER BODY CLOTHING: A LOT
MOVING TO AND FROM BED TO CHAIR: A LOT
PERSONAL GROOMING: A LOT
MOVING FROM LYING ON BACK TO SITTING ON SIDE OF FLAT BED WITH BEDRAILS: A LOT
WALKING IN HOSPITAL ROOM: A LOT
EATING MEALS: A LOT
DAILY ACTIVITIY SCORE: 12
DRESSING REGULAR UPPER BODY CLOTHING: A LOT
MOBILITY SCORE: 12
STANDING UP FROM CHAIR USING ARMS: A LOT
CLIMB 3 TO 5 STEPS WITH RAILING: A LOT

## 2024-11-16 ASSESSMENT — PAIN SCALES - GENERAL
PAINLEVEL_OUTOF10: 0 - NO PAIN

## 2024-11-16 NOTE — PROGRESS NOTES
"Zulma Rao is a 79 y.o. female on day 5 of admission presenting with Cerebrovascular accident (CVA) due to occlusion of left middle cerebral artery (Multi).    Subjective   NAEO    Objective     Physical Exam  Aox2 (3 w choice)  FCx4    Last Recorded Vitals  Blood pressure 161/59, pulse 83, temperature 35.9 °C (96.6 °F), temperature source Temporal, resp. rate 16, height 1.626 m (5' 4\"), weight 50.9 kg (112 lb 3.4 oz), SpO2 99%.  Intake/Output last 3 Shifts:  I/O last 3 completed shifts:  In: 942.7 (18.5 mL/kg) [P.O.:540; I.V.:202.7 (4 mL/kg); IV Piggyback:200]  Out: 3549 (69.7 mL/kg) [Urine:3549 (1.9 mL/kg/hr)]  Weight: 50.9 kg     Relevant Results  Lab Results   Component Value Date    WBC 9.0 11/16/2024    HGB 10.2 (L) 11/16/2024    HCT 31.4 (L) 11/16/2024    MCV 91 11/16/2024     11/16/2024       Assessment/Plan   Principal Problem:    Cerebrovascular accident (CVA) due to occlusion of left middle cerebral artery (Multi)    Zulma Rao is an 79 year old with h/o HFpEF, NSTEMI, T2DM, HTN, HLD, anxiety, hypothyroidism, COPD, previous R sided stroke (7/2024), b/l cataracts, bilateral carotid stenosis s/p R CEA (2011), HCC s/p partial hepatectomy (2019), p/w CVA 2/2 L M1 occlusion, s/p TNK, s/p MT w TICI 3, MRI L watershed infarcts, small infarcts involving left frontal, parietal, and temporal lobes, CTA 74% L carotid artery occlusion proximal to bifurcation, moderate R V2 narrowing, R carotid occlusion at origin, CTA stable    MRI brain stable evolving infarcts, interval increase watershed infarcts    Stroke primary  OR Mon 11/18 for L CEA  Patient will need EEG leads connected prior to OR  Hold Plavix, continue ASA  SBP goal 180-200 Per stroke  Cards recs- RCRI 3, elevated risk, but not prohibitive          Dave Soriano MD     "

## 2024-11-16 NOTE — PROGRESS NOTES
[facility]  NEUROSCIENCE INTENSIVE CARE UNIT  DAILY PROGRESS NOTE       Patient Name: Zulma Rao   MRN: [id number]     Admit Date: 2024     : 1945 AGE: 79 y.o. GENDER: female        Subjective    79 y.o. female with PMH prior R sided stroke with left sided deficit (per personal review of imaging, L sided posterior limb of internal capsule) 2024, bilateral carotid artery stenosis S/P R CEA , HFpEF, NSTEMI, T2DM, HT, DLD, hypothyroidism, COPD, HCC S/P partial hepatectomy (), anxiety, b/l cataracts.  Admitted 2024 with Cerebrovascular accident (CVA) due to occlusion of left middle cerebral artery (Multi) after transferred for mechanical thrombectomy.     History obtained from  Mynor, who reported that pt was home for lunch today, last known well was 12pm. She was standing in the kitchen, and suddenly started having whole body shaking. He was able to catch her before falling to ground. Noticed that she had left sided facial droop that was new and pt was looking to the left, not speaking or communicating with him. He called 911 and EMS brought her to the [facility] ED. NIH on arrival was 20. BP on arrival was:     CTH completed showing dense left MCA sign. CTA head and neck completed showing proximal left M1 occlusion and what appears to be chronic right ICA occlusion. She was given TNK at 12:55. Life flighted to [facility] for MT pamelaal. NIH on arrival was 21 (breakdown below), BP on arrival was 130/93 with BG 93. Mechanical thrombectomy completed and post NIH score was:  Admitted to NSU.      Pt's  reports history of stroke in July of this year for which she originally had left sided weakness and facial droop that completely resolved and was back to her normal baseline prior to stroke. Was able to ambulate independently, but then fell in September and broke her right hip requiring surgical fixation. She was doing well, walking with walker for long distance but  recently has been ambulating independently.  reported no history of arrhythmias and reports pt was taking daily plavix.     Significant Events:  - 11/11: TNK and spontaneous recanalization on angiogram TICI 3  - 11/13: Had pressure-dependent exam, placed on Levo and Miguel. NIH 4  - 11/14: Repeat MRI, increased infarction size, keep -200  - 11/15: Start midodrine for BP control    Interval Events:   Overnight: Gave 1L and replete K at 0550 for soft pressure  AM: Denies any pain or discomfort.     Objective   VITALS (24H):  Temp:  [35.8 °C (96.4 °F)-36.2 °C (97.2 °F)] 36.1 °C (97 °F)  Heart Rate:  [] 105  Resp:  [12-28] 18  BP: (116-181)/() 137/67  Arterial Line BP 1: (151-228)/(48-88) 178/56  INTAKE/OUTPUT:  Intake/Output Summary (Last 24 hours) at 11/16/2024 0719  Last data filed at 11/16/2024 0600  Gross per 24 hour   Intake 689.99 ml   Output 1320 ml   Net -630.01 ml     VENT SETTINGS:        PHYSICAL EXAM:  NEURO:   - Alert, oriented x3, follows simple commands  - EOS, PERRL, EOMI, VFF  - NIHSS as below  CV:  - RRR on telemetry, NSR  RESP:  - No signs of resp distress  - Oxygen: Nasal cannula 2.5L  :  - External catheter in place  GI:  - Abdomen NT/ND, soft  SKIN:  - Intact    11/16/24 @ 8:00AM (SBP about 160)  NIHSS:   - Level of consciousness: 0 = Alert  - LOC Question: 0 = Both correct  - LOC Commands: 0 = Obeys both  - Best Gaze: 0 = Normal  - Visual Field: 0 = No visual loss  - Facial Paresis: 0 = Normal  - LUE: 0 = No drift; 10 sec  - RUE: 2 = Effort vs gravity  - LLE: 0 = No drift; 5 sec  - RLE: 1 = Drift  - Limb Ataxia: 0 = Absent  - Sensory: 0 = Absent  - Best Language: 0 = No aphasia  - Dysarthria: 0 = Normal  - Neglect: 0 = None  TOTAL: 3       MEDICATIONS:  Scheduled: PRN: Continuous:   aspirin, 81 mg, oral, Daily  atorvastatin, 80 mg, oral, Nightly  azithromycin, 250 mg, oral, Once per day on Monday Wednesday Friday  busPIRone, 10 mg, oral, TID  [Held by provider]  clopidogrel, 75 mg, oral, Daily  pantoprazole, 40 mg, oral, Daily before breakfast   Or  esomeprazole, 40 mg, nasoduodenal tube, Daily before breakfast   Or  pantoprazole, 40 mg, intravenous, Daily before breakfast  tiotropium, 2 puff, inhalation, Daily   And  fluticasone furoate-vilanteroL, 1 puff, inhalation, Daily  heparin (porcine), 5,000 Units, subcutaneous, q8h  insulin lispro, 0-5 Units, subcutaneous, TID AC  levothyroxine, 25 mcg, oral, Daily  lidocaine, 1 patch, transdermal, Daily  melatonin, 3 mg, oral, Nightly  midodrine, 5 mg, oral, TID  polyethylene glycol, 17 g, oral, Daily  sennosides, 2 tablet, oral, BID     PRN medications: acetaminophen, alteplase, calcium gluconate, calcium gluconate, dextrose, dextrose, glucagon, glucagon, HYDROmorphone, ipratropium-albuteroL, magnesium sulfate, magnesium sulfate, ondansetron ODT **OR** ondansetron, oxygen, potassium chloride CR **OR** potassium chloride, potassium chloride CR **OR** potassium chloride, potassium chloride norepinephrine, 0-0.5 mcg/kg/min, Last Rate: 0.07 mcg/kg/min (11/16/24 0644)         LAB RESULTS:  Results from last 72 hours   Lab Units 11/16/24  0134 11/15/24  0026 11/15/24  0025 11/15/24  0024   GLUCOSE mg/dL 100* 92  --   --    SODIUM mmol/L 142 143  --   --    POTASSIUM mmol/L 3.3* 3.1*  --   --    CHLORIDE mmol/L 106 107  --   --    CO2 mmol/L 26 27  --   --    ANION GAP mmol/L 13 12  --   --    BUN mg/dL 8 5*  --   --    CREATININE mg/dL 0.39* 0.32*  --   --    EGFR mL/min/1.73m*2 >90 >90  --   --    CALCIUM mg/dL 8.3* 8.1*  --   --    PHOSPHORUS mg/dL 3.3 2.5  --   --    ALBUMIN g/dL 3.4 3.5  --   --    MAGNESIUM mg/dL 2.11  --  2.26  --    POCT CALCIUM IONIZED (MMOL/L) IN BLOOD mmol/L 1.14  --   --  0.97*      Results from last 72 hours   Lab Units 11/16/24  0134 11/15/24  0025   WBC AUTO x10*3/uL 9.0 9.1   NRBC AUTO /100 WBCs 0.0 0.0   RBC AUTO x10*6/uL 3.44* 3.32*   HEMOGLOBIN g/dL 10.2* 9.9*   HEMATOCRIT % 31.4* 29.1*   MCV fL 91  88   MCH pg 29.7 29.8   MCHC g/dL 32.5 34.0   RDW % 14.0 13.9   PLATELETS AUTO x10*3/uL 186 143*   NEUTROS PCT AUTO % 64.7 72.7   IG PCT AUTO % 0.2 0.2   LYMPHS PCT AUTO % 15.5 12.4   MONOS PCT AUTO % 12.0 10.3   EOS PCT AUTO % 6.8 3.9   BASOS PCT AUTO % 0.8 0.5   NEUTROS ABS x10*3/uL 5.84* 6.62*   IG AUTO x10*3/uL 0.02 0.02   LYMPHS ABS AUTO x10*3/uL 1.40 1.13   MONOS ABS AUTO x10*3/uL 1.08* 0.94*   EOS ABS AUTO x10*3/uL 0.61* 0.36   BASOS ABS AUTO x10*3/uL 0.07 0.05      Results from last 72 hours   Lab Units 11/16/24  0134 11/15/24  0026   ALBUMIN g/dL 3.4 3.5      IMAGING RESULTS:  MRI (11/14)  1. Evolving infarcts located within the left cerebral hemisphere,  including in the watershed distribution, overall more pronounced in  appearance but similar in extent with no hemorrhagic transformation  or surrounding mass effect.  2. Additional evolving acute infarcts are seen located within the  bilateral cerebral hemispheres and are stable to slightly more  pronounced in appearance. Few new punctate acute infarcts located  within the left cerebral hemisphere. There is no surrounding mass  effect or hemorrhagic transformation.    MRI (11/13)  1. Acute/subacute watershed ischemia throughout the left cerebral  hemisphere  2. Subcentimeter cortical infarctions involving the left superior  frontal lobe but also involving the left parietal and temporal lobes.  No evidence of hemorrhagic transformation.  3. Nonspecific white matter changes which can be seen in the setting  of chronic microangiopathy.    Reviewed OSH imaging on PACS  CT head wo IV contrast    Result Date: 11/12/2024  CTA neck:   Complete occlusion of the right common carotid artery and right cervical internal carotid artery.   Coarse atherosclerotic calcification at the left carotid bifurcation with approximately 65% stenosis by NASCET criteria.   The vertebral arteries are patent. The left vertebral artery is dominant. There is a coarse calcification at the  origin of the right vertebral artery.   Calcified and noncalcified plaque along the aortic arch and origins of the great vessels with narrowing.   CTA head:   There is an abrupt cutoff of the distal left anterior cerebral artery.   Occlusion of the right petrous and cavernous carotid arteries with reconstitution in the supraclinoid segment.   The left intracranial carotid artery is patent with coarse atherosclerotic calcification and mild narrowing.   The posterior circulation is patent without significant stenosis.   CT head:   No acute intracranial hemorrhage or mass effect.   I personally reviewed the images/study and I agree with the findings as stated by Simone Joy MD (Radiology Resident).   MACRO: Simone Joy discussed the significance and urgency of this critical finding by telephone with  Jeaneth Graves MD on 11/12/2024 at 5:59 am.  (**-RCF-**) Findings:  See findings.   .   Signed by: Latesha Zuniga 11/12/2024 7:20 AM Dictation workstation:   [id number]    CT angio head and neck w and wo IV contrast    Result Date: 11/12/2024  CTA neck:   Complete occlusion of the right common carotid artery and right cervical internal carotid artery.   Coarse atherosclerotic calcification at the left carotid bifurcation with approximately 65% stenosis by NASCET criteria.   The vertebral arteries are patent. The left vertebral artery is dominant. There is a coarse calcification at the origin of the right vertebral artery.   Calcified and noncalcified plaque along the aortic arch and origins of the great vessels with narrowing.   CTA head:   There is an abrupt cutoff of the distal left anterior cerebral artery.   Occlusion of the right petrous and cavernous carotid arteries with reconstitution in the supraclinoid segment.   The left intracranial carotid artery is patent with coarse atherosclerotic calcification and mild narrowing.   The posterior circulation is patent without significant  stenosis.   CT head:   No acute intracranial hemorrhage or mass effect.   I personally reviewed the images/study and I agree with the findings as stated by Simone Joy MD (Radiology Resident).   MACRO: Simone Joy discussed the significance and urgency of this critical finding by telephone with  Jeaneth Graves MD on 11/12/2024 at 5:59 am.  (**-RCF-**) Findings:  See findings.   .   Signed by: Latesha Zuniga 11/12/2024 7:20 AM Dictation workstation:   [id number]    CT head wo IV contrast    Result Date: 11/12/2024  No acute intracranial hemorrhage or mass effect.   Small focal area of encephalomalacia and gliosis within the right occipital lobe.   Mild white matter changes compatible with microangiopathy. Lacunar infarct within the right basal ganglia.   I personally reviewed the images/study and I agree with the findings as stated by Simone Joy MD (Radiology Resident).   MACRO: None   Signed by: Latesha Zuniga 11/12/2024 7:03 AM Dictation workstation:   [id number]       Assessment/Plan    Update 11/16/24  - [place] following, per note: BP goal 180-200 until carotid revascularization, c/w ASA, hold plavix, continue SCDs and Q8h subcutaneous heparin, ctn atorvastatin 80mg  - NSGY following per note: OR Mon 11/18 for L CEA, will need EEG leads connected prior to OR, hold plavix, continue ASA, SBP goal 180-200 per stroke, card res - RCRI 3, elevated risk but not prohibitive  - c/w pressors    To follow up  - EEG start 11/17 (prior to OR)    Dispo:  Stroke  PT/OT: PT - moderate intensity, OT - moderate intensity  - Contact vas Sx fellow over chat closer to discharge to coordinate follow up PAD with aortic occlusion  -----    NEURO:  # L MCA stroke S/P TNK, Spontaneous recanalization TICI 3  # R sided stroke with left sided deficit (per personal review of imaging L sided posterior limb of internal capsule) 7/2024  # Bilateral carotid artery stenosis S/P R CEA  2011  Assessment:  - Neurologically: see exam above  - Fluctuating exams at times  - LDL (11/11) 92, A1c (11/11) 5.8  - BNP (11/11) 188  - CTA HN (11/12): complete occlusion of R CCA and R cervical ICA. 65% stenosis of L   Carotid bifurcation, abrupt cutoff of the distal L DAKOTA, occlusion of the right petrous and cavernous carotid arteries  - MRI Brain (11/12): Acute/subacute watershed ischemia throughout the left cerebral hemisphere, subcentimeter cortical infarctions upon the left superior frontal lobe, left parietal and temporal lobes  - CTA HN (11/13): Interval increased CT conspicuity, unchanged occlusion of the left DAKOTA callosal marginal branch, R CCA, R ICA, unchanged flow stenosis in L ICA, L CCA, R>L subclavian artery  - vEEG (final): indicative of left hemispheric structural lesion. No epileptiform discharges are noted seen  - MRI (11/14): evolving infacts within L cerebral hemisphere, including in the watershed distribution, more pronounced but similar in extent transformation. Additional evolving acute infarcts within the bilateral cerebral hemispheres. Few new punctate acute infarcts located within the left cerebral hemisphere.   Plan:  - NSU  - Neuro Checks: Q1H, consider Q2H neuro check  - Sedation: None  - Pain: acetaminophen PRN, Q6H  - Nausea: ondansetron  - PT/OT/SLP  - c/w home Atorvastatin 80mg  - c/w aspirin 81mg   - Hold plavix 75mg  - Cardiology signed off, per note: no cardiac contraindications to planned procedure, no further cardiac work up warranted  - [place] following, per note: BP goal 180-200 until carotid revascularization, c/w ASA, hold plavix, continue SCDs and Q8h subcutaneous heparin, ctn atorvastatin 80mg  - NSGY following per note: OR Mon 11/18 for L CEA, will need EEG leads connected prior to OR, hold plavix, continue ASA, SBP goal 180-200 per stroke, card res - RCRI 3, elevated risk but not prohibitive  - EEG tomorrow    CARDIOVASCULAR:  # Severe PAD 2/2 Chronic infrarenal  aortic occlusion  # HFpEF  # History of HTN, HLD  Assessment:  - EKG: pending  - ECHO 11/12/24: LVEF 65-70%, LVH, severely dilated LA, negative bubble study  - CT Aorti-ileofemoral w/ runoff: Extensive vascular disease including occlusion of the infrarenal abdominal aorta, bilateral common iliac arteries, no flow appreciated left PT   Plan:  - Continue to monitor on telemetry  - BP goal: 180-200  --> on Levo and Miguel, wean as tolerated  - c/w monitor doppler  - Vasc Sx signed off, per note: no acute intervention . Good flow to radial artery. Chronic infrarenal aortic occlusion. Continue to monitor doppler signals  - consider increasing midodrine to 10mg TID    RESPIRATORY:  # COPD  Assessment:  - PFT (10/05/2023 at OSH): FEV1 50% predicted. FEV1/FVC 69%. No TLC obtained. DLCO 40%   Plan:  - Continuous pulse oximetry   - O2 PRN to maintain SpO2 > 88%, wean as tolerated  - Incentive spirometry while awake  - Continue Trelegy Ellipta (change to inpatient formula)  - Wean O2, maintain SpO2 88-92%    RENAL/:  #JAIME, resolved  Assessment:  - Baseline BUN/Cr: 12/0.63  Results from last 72 hours   Lab Units 11/16/24  0134 11/15/24  0026   BUN mg/dL 8 5*   CREATININE mg/dL 0.39* 0.32*       - likely from contrast, now resolved with renal function return to baseline  Plan:  - Monitor with daily RFP  - Maintain external urinary diversion device for strict I&O    FEN/GI:  #HCC S/P partial hepatectomy (2019)  #Oral dysphagia  Assessment:  - Last BM Date: 11/16/24  Plan:  - Monitor and replace electrolytes per protocol  - IVF: None  - Adult diet Cardiac; 70 gm fat; 2 - 3 grams Sodium; Minced and moist 5; Thin 0  - Bowel Regimen: Docusate-Senna BID and Miralax QD  - SLP following, per note: Minced and moist, thin liquid    ENDOCRINE:  # T2DM  # Hypothyroidism  Assessment:  Results from last 7 days   Lab Units 11/16/24  0415 11/16/24  0134 11/15/24  2316 11/15/24  2001 11/15/24  1600 11/15/24  1237 11/15/24  0747 11/15/24  0405  11/15/24  0026   POCT GLUCOSE mg/dL 111*  --  110* 120* 100* 146* 102*   < >  --    GLUCOSE mg/dL  --  100*  --   --   --   --   --   --  92    < > = values in this interval not displayed.   - HbA1C: 6.3 2024   Plan:  - Accuchecks & ISS Q4H, plan to relax to Q6H  - Hold home metformin  - Continue home Levothyroxine  - Hypoglycemia monitoring     HEMATOLOGY:  #JOSE, possibly hemodilution  Assessment:  - Baseline Hgb: 11.6  - Baseline Plts: 258  Results from last 72 hours   Lab Units 24  0134 11/15/24  0025   WBC AUTO x10*3/uL 9.0 9.1   NRBC AUTO /100 WBCs 0.0 0.0   RBC AUTO x10*6/uL 3.44* 3.32*   HEMOGLOBIN g/dL 10.2* 9.9*   HEMATOCRIT % 31.4* 29.1*   MCV fL 91 88   MCH pg 29.7 29.8   MCHC g/dL 32.5 34.0   RDW % 14.0 13.9   PLATELETS AUTO x10*3/uL 186 143*      Plan:  - Continue to monitor with daily CBC and Coag panel    INFECTIOUS DISEASE:  #JOSE  Assessment:  Results from last 7 days   Lab Units 24  0134 11/15/24  0025   WBC AUTO x10*3/uL 9.0 9.1    - Temp (24hrs), Av.9 °C (96.7 °F), Min:35.8 °C (96.4 °F), Max:36.2 °C (97.2 °F)     Plan:  - Continue to monitor for s/sx of infection  - Pan culture for temperature > 38.4 C    MUSCULOSKELETAL:  - No acute issues    SKIN:  - No acute issues  - Turns and skin care per NSU protocol    ACCESS:  - PIV    PROPHYLAXIS:  - DVT Ppx: SCDs and SQH  - GI Ppx: Pantoprazole    RESTRAINTS:  Not indicated/Patient does not meet criteria for restraints    Lashanda Handley MD  Neuro Critical Care    The patient is critically ill with acute left MCA stroke  Neurologically stable  Cont BP support: keep sbp>180  Plan for carotid endarterectomy   Secondary stroke prophylaxis per stroke team: Cont aspirin (plavix held in anticipation of surgery)  Chronic diastolic heart failure  COPD: Cont inhalers  Diabetes: Cont BG control     I have seen and examined the patient.  I have reviewed the patient's laboratory, radiographic, and clinical data.  I have spent 30  minutes providing critical care for the patient.       CARISSA Santo M.D.

## 2024-11-16 NOTE — CARE PLAN
The patient's goals for the shift include  to sleep    The clinical goals for the shift include  maintain BP in ordered parameters, stable neuro exams      Problem: Pain - Adult  Goal: Verbalizes/displays adequate comfort level or baseline comfort level  Outcome: Progressing     Problem: Safety - Adult  Goal: Free from fall injury  Outcome: Progressing     Problem: Discharge Planning  Goal: Discharge to home or other facility with appropriate resources  Outcome: Progressing     Problem: Chronic Conditions and Co-morbidities  Goal: Patient's chronic conditions and co-morbidity symptoms are monitored and maintained or improved  Outcome: Progressing     Problem: Fall/Injury  Goal: Not fall by end of shift  Outcome: Progressing  Goal: Be free from injury by end of the shift  Outcome: Progressing  Goal: Verbalize understanding of personal risk factors for fall in the hospital  Outcome: Progressing     Problem: Pain  Goal: Takes deep breaths with improved pain control throughout the shift  Outcome: Progressing  Goal: Turns in bed with improved pain control throughout the shift  Outcome: Progressing  Goal: Performs ADL's with improved pain control throughout shift  Outcome: Progressing  Goal: Free from acute confusion related to pain meds throughout the shift  Outcome: Progressing     Problem: General Stroke  Goal: Establish a mutual long term goal with patient by discharge  Outcome: Progressing  Goal: Demonstrate improvement in neurological exam throughout the shift  Outcome: Progressing  Goal: Maintain BP within ordered limits throughout shift  Outcome: Progressing  Goal: Participate in treatment (ie., meds, therapy) throughout shift  Outcome: Progressing  Goal: No symptoms of aspiration throughout shift  Outcome: Progressing  Goal: No symptoms of hemorrhage throughout shift  Outcome: Progressing  Goal: Controlled blood glucose throughout shift  Outcome: Progressing     Problem: Skin  Goal: Decreased wound size/increased  tissue granulation at next dressing change  Outcome: Progressing  Goal: Participates in plan/prevention/treatment measures  Outcome: Progressing  Goal: Prevent/manage excess moisture  Outcome: Progressing  Goal: Prevent/minimize sheer/friction injuries  Outcome: Progressing  Goal: Promote/optimize nutrition  Outcome: Progressing  Goal: Promote skin healing  Outcome: Progressing

## 2024-11-16 NOTE — PROGRESS NOTES
Zulma Rao is a 79 y.o. female on day 5 of admission presenting with Cerebrovascular accident (CVA) due to occlusion of left middle cerebral artery (Multi).    Subjective   No acute events overnight. No new concerns this morning. Electrolytes, fluids repleted ovn per [facility] team, still on vasopressor therapy with norepinephrine.      Patient seen eating breakfast with daughter and fiance at bedside. Intermittently complains of BL EL pain. Improved with breakfast and social company.          Objective     Overnight Arterial Line BP Range since 11/16 0000: 172-228 / 54-84  SPO2 range: %  Tachy 122, tachypnea 28 @ 0645        11/16/2024     6:00 AM 11/16/2024     6:15 AM 11/16/2024     6:30 AM 11/16/2024     6:45 AM 11/16/2024     7:00 AM 11/16/2024     8:09 AM 11/16/2024    11:26 AM   Vitals   Systolic 159    137     Diastolic 76    67     Heart Rate 95 113 90 122 105     Temp      36.2 °C (97.2 °F) 36.6 °C (97.9 °F)   Resp 20 21 16 28 18           Physical Exam  Vitals reviewed.   HENT:      Head: Normocephalic and atraumatic.      Mouth/Throat:      Mouth: Mucous membranes are moist.   Eyes:      Extraocular Movements: Extraocular movements intact.   Cardiovascular:      Rate and Rhythm: Normal rate and regular rhythm.      Heart sounds: No murmur heard.  Pulmonary:      Effort: Pulmonary effort is normal.      Breath sounds: Normal breath sounds. No wheezing.   Neurological:      Mental Status: She is alert.     Neurological Exam  Mental Status  Alert. Oriented to person, place, time and situation. Mild dysarthria. No expressive/global/receptive aphasia noted.     Cranial Nerves  CN II: Visual fields intact bilaterally in all quadrants to threat - blinks to threat on exam bilaterally   CN III, IV, VI: Extraocular movements intact bilaterally.   Right pupil: 4 mm, Round. Reactive to light. Reactive to accommodation.   Left pupil: 4 mm, Round. Reactive to light. Reactive to accommodation.  CN V: Facial  sensation is normal.  CN VII: Full and symmetric facial movement.  CN VIII: Hearing grossly intact to conversation.  CN IX, X: Palate elevates symmetrically  CN XI:  Right: Sternocleidomastoid strength is normal. Trapezius strength is 1/5.  Left: Sternocleidomastoid strength is normal. Trapezius strength is normal.  CN XII: Tongue midline without atrophy or fasciculations.     Motor  No fasciculations present.  Moves all extremities.   LUE: No drift after 10 seconds  LLE: No drift after 5 seconds  RUE: strong  but unable to lift antigravity,   RLE: Drift on exam before 5 seconds     Sensory  Sensory  Extremity sensation grossly intact and symmetric to light touch bilaterally.      Coordination  Left: Finger-to-nose normal.  R finger to nose unable to assess.    NIHSS    1a  Level of consciousness: 0=alert; keenly responsive   1b. LOC questions:  0=Performs both tasks correctly   1c. LOC commands: 0=Performs both tasks correctly   2.  Best Gaze: 0=normal   3. Visual: 0=No visual loss   4. Facial Palsy: 0=Normal symmetric movement   5a. Motor Left Arm: 0=No drift, limb holds 90 (or 45) degrees for full 10 seconds   5b.  Motor Right Arm: 3=No effort against gravity, limb falls   6a. Motor Left Le=No drift, limb holds 90 (or 45) degrees for full 10 seconds   6b  Motor Right Le=Drift, limb holds 90 (or 45) degrees but drifts down before full 10 seconds: does not hit bed   7. Limb Ataxia: 0=Absent   8.  Sensory: 0=Normal; no sensory loss   9. Best Language:  0=No aphasia, normal   10. Dysarthria: 1=Mild to moderate, patient slurs at least some words and at worst, can be understood with some difficulty   11. Extinction and Inattention: 0=No abnormality          Total:   5     Scheduled medications  aspirin, 81 mg, oral, Daily  atorvastatin, 80 mg, oral, Nightly  azithromycin, 250 mg, oral, Once per day on   busPIRone, 10 mg, oral, TID  [Held by provider] clopidogrel, 75 mg, oral,  Daily  pantoprazole, 40 mg, oral, Daily before breakfast   Or  esomeprazole, 40 mg, nasoduodenal tube, Daily before breakfast   Or  pantoprazole, 40 mg, intravenous, Daily before breakfast  tiotropium, 2 puff, inhalation, Daily   And  fluticasone furoate-vilanteroL, 1 puff, inhalation, Daily  heparin (porcine), 5,000 Units, subcutaneous, q8h  insulin lispro, 0-5 Units, subcutaneous, TID AC  levothyroxine, 25 mcg, oral, Daily  lidocaine, 1 patch, transdermal, Daily  melatonin, 3 mg, oral, Nightly  midodrine, 5 mg, oral, TID  polyethylene glycol, 17 g, oral, Daily  sennosides, 2 tablet, oral, BID      Continuous medications  norepinephrine, 0-0.5 mcg/kg/min, Last Rate: 0.07 mcg/kg/min (11/16/24 0644)      PRN medications  PRN medications: acetaminophen, alteplase, calcium gluconate, calcium gluconate, dextrose, dextrose, glucagon, glucagon, HYDROmorphone, ipratropium-albuteroL, magnesium sulfate, magnesium sulfate, ondansetron ODT **OR** ondansetron, oxygen, potassium chloride CR **OR** potassium chloride, potassium chloride CR **OR** potassium chloride, potassium chloride    Results from last 72 hours   Lab Units 11/16/24  0134 11/15/24  0026 11/15/24  0025 11/14/24  0216   SODIUM mmol/L 142 143  --  142   POTASSIUM mmol/L 3.3* 3.1*  --  3.4*   BUN mg/dL 8 5*  --  4*   CREATININE mg/dL 0.39* 0.32*  --  0.32*   CALCIUM mg/dL 8.3* 8.1*  --  7.0*   MAGNESIUM mg/dL 2.11  --  2.26 1.73      Results from last 72 hours   Lab Units 11/16/24  0134 11/15/24  0025 11/14/24  0216   WBC AUTO x10*3/uL 9.0 9.1 9.2   HEMOGLOBIN g/dL 10.2* 9.9* 9.7*   HEMATOCRIT % 31.4* 29.1* 28.7*   PLATELETS AUTO x10*3/uL 186 143* 129*            IMAGING:    MRI 11/14/2024  FINDINGS:  CSF Spaces: Diffuse prominence of the ventricles and sulci is noted.  The basal cisterns are clear. Appearance is unchanged since the  recent MRI brain study.      Parenchyma: Compared to MRI 11/13/2024, interval increase in size of  restricted diffusion with  associated T2/FLAIR hyperintensity  involving the left cerebral hemispheric white matter primarily within  the white matter. Additional foci of restricted diffusion with  associated T2/FLAIR hyperintensity are noted within the bilateral  frontal lobes including the precentral gyri, left parietal lobe, left  temporal lobe, and left insula as well as the left mesial temporal  lobe. No striking hemorrhagic transformation or striking mass effect.      Few new small foci of restricted diffusion are seen, including within  the left parietal lobe gray-white matter junction (series 3 image 50  of 76) and a small focus (Series 3, Image 53) of restricted diffusion  located within the left parietal lobe gray matter (series 3, image 53  of 76) was faintly seen on the prior MRI.      Encephalomalacia/gliosis is again noted in the posterior right  occipital lobe. Chronic right thalamic infarct is again seen.  Additional patchy areas of T2/FLAIR hyperintensity are noted within  the deep, periventricular, and subcortical white matter bilaterally,  likely related to chronic small vessel ischemic changes. Small old  infarct in the right cerebellum is unchanged. There is no mass effect  or midline shift.      Loss of flow void located within distal right cervical ICA and  portions of the right intracranial ICA consistent with occlusion and  better evaluated on the prior CTA head study.      Paranasal Sinuses and Mastoids: Visualized paranasal sinuses and  mastoid air cells are unremarkable.      IMPRESSION:  1. Evolving infarcts located within the left cerebral hemisphere,  including in the watershed distribution, overall more pronounced in  appearance but similar in extent with no hemorrhagic transformation  or surrounding mass effect.      2. Additional evolving acute infarcts are seen located within the  bilateral cerebral hemispheres and are stable to slightly more  pronounced in appearance. Few new punctate acute infarcts  located  within the left cerebral hemisphere. There is no surrounding mass  effect or hemorrhagic transformation.    IMAGING:  MR brain wo IV contrast  Result Date: 11/13/2024 1:26 am     FINDINGS:   CSF Spaces: The ventricles, sulci and basal cisterns are within normal limits for patient's age.     Parenchyma: Watershed distribution areas of T2/FLAIR hyperintense signal with associated diffusion restriction throughout the left cerebral hemisphere most pronounced in the superior left frontal lobe but also involving the parietal and medial temporal lobes. Finding is consistent with acute/subacute watershed infarction.   Cortical foci of diffusion restriction in the left frontal lobe cortex along the interhemispheric aspect, in the left sylvian fissure and posterior left insula and in the left posterior temporal lobe. These findings are consistent with non watershed distribution   2.5 cm focus of encephalomalacia within the right occipital lobe consistent with previous infarction in the distribution of the right posterior cerebral artery. Focus of subacute/chronic lacunar infarction in the right thalamus measuring a proximally 5 mm x 15 mm in size. Focus of susceptibility artifact within the left basal ganglia favored to correspond to mineralization seen on prior CT. Nonspecific patchy T2/FLAIR hyperintense signal within the periventricular and subcortical white matter, likely sequela of chronic microangiopathy.   There is no mass effect or midline shift.        Paranasal Sinuses and Mastoids: Visualized paranasal sinuses and mastoid air cells are unremarkable.   Note is made of postsurgical changes from bilateral lens replacements.        Impression  1. Acute/subacute watershed ischemia throughout the left cerebral hemisphere   2. Subcentimeter cortical infarctions involving the left superior frontal lobe but also involving the left parietal and temporal lobes. No evidence of hemorrhagic transformation.   3.  Nonspecific white matter changes which can be seen in the setting of chronic microangiopathy.        Transthoracic Echo (TTE) Complete  11/12/2024    CONCLUSIONS:    1. Left ventricular ejection fraction is normal, by visual estimate at 65-70%.    2. Spectral Doppler shows an abnormal pattern of left ventricular diastolic filling.    3. Left ventricular cavity size is decreased.    4. No left ventricular thrombus visualized.    5. There is normal right ventricular global systolic function.    6. The left atrium is severely dilated.    7. Agitated saline contrast study was negative for intracardiac shunt.    8. Normal aortic root.    9. No prior echocardiogram available for comparison.      CT ANGIO AORTA AND BILATERAL ILIOFEMORAL RUN OFF INCLUDING WITHOUT  CONTRAST 11/12/2024  IMPRESSION:  1. Extensive vascular disease including occlusion of the infrarenal  abdominal aorta, bilateral common iliac arteries. Additionally, no  appreciated flow is noted in the left posterior tibial artery  distally. Please refer to the detailed description above.  2. Other nonspecific finding as detailed above.     CT head wo IV contrast; CT angio head and neck w and wo IV contrast  11/12/2024 5:34 am     FINDINGS: NON-CONTRAST HEAD CT:     BRAIN PARENCHYMA:  No evidence of acute intraparenchymal hemorrhage or parenchymal evidence of an acute large territory ischemic infarct. No mass-effect, midline shift or effacement of cerebral sulci. Gray-white matter distinction is preserved. Punctate calcifications within the left-greater-than-right basal ganglia. Lacunar within the posterior limb of the right internal capsule and left basal ganglia. Small amount of encephalomalacia and gliosis within the right occipital lobe.     VENTRICLES and EXTRA-AXIAL SPACES:  No acute extra-axial or intraventricular hemorrhage. Ventricles and sulci are age-concordant.     PARANASAL SINUSES/MASTOIDS:  No hemorrhage or air-fluid levels within the visualized  paranasal sinuses. The mastoids are well aerated.     CALVARIUM/ORBITS:  No skull fracture.  The orbits and globes are intact to the extent visualized.     EXTRACRANIAL SOFT TISSUES: No discernible abnormality.         CTA NECK:   There is calcified and noncalcified plaque along the aortic arch and origins of the great vessels. There is marked narrowing at the origin of the right common carotid artery and right subclavian artery. There is moderate narrowing secondary to noncalcified plaque at the origin of the left subclavian artery.     LEFT VERTEBRAL ARTERY:  No hemodynamically significant stenosis, occlusion, or dissection. The left vertebral artery is dominant.     LEFT COMMON/INTERNAL CAROTID ARTERY:  There is calcified plaque along the course of the common carotid artery with mild-to-moderate narrowing. There is coarse calcification at the carotid bifurcation and along the proximal cervical internal carotid artery with a proximally 65% stenosis by NASCET criteria. The cervical internal carotid artery is patent to the level of the skull base without additional stenosis.     RIGHT VERTEBRAL ARTERY: There is coarse atherosclerotic calcification at the origin of the right vertebral artery. No hemodynamically significant stenosis, occlusion, or dissection.     RIGHT COMMON/INTERNAL CAROTID ARTERY:  There is occlusion of the right common carotid artery just beyond the origin. There is occlusion of the right cervical internal carotid artery.   Limited images through the lung apices demonstrate emphysematous changes. Mild degenerative changes of the cervical spine without evidence of high-grade spinal canal stenosis. Soft tissues of the neck are unremarkable.            CTA HEAD:     ANTERIOR CIRCULATION: No aneurysm.    - Internal Carotid Arteries: There is occlusion of the right petrous and cavernous internal carotid artery. There is reconstitution within the supraclinoid segment. The left intracranial carotid  artery is patent. There is atherosclerotic calcification along the cavernous and supraclinoid ICA with mild narrowing.     - Middle Cerebral Arteries: No hemodynamically significant stenosis or occlusion.     - Anterior Cerebral Arteries: There is and abrupt cutoff of the distal left anterior cerebral artery (series 504, image 82). The right DAKOTA is without hemodynamically significant stenosis or occlusion.       POSTERIOR CIRCULATION: No aneurysm.     - Intracranial Vertebral Arteries:  No hemodynamically significant stenosis or occlusion.     - Basilar Artery:  No hemodynamically significant stenosis or occlusion.     - Posterior Cerebral Arteries:  No hemodynamically significant stenosis or occlusion.        CTA NECK:     Complete occlusion of the right common carotid artery and right cervical internal carotid artery.   Coarse atherosclerotic calcification at the left carotid bifurcation with approximately 65% stenosis by NASCET criteria.   The vertebral arteries are patent. The left vertebral artery is dominant. There is a coarse calcification at the origin of the right vertebral artery.   Calcified and noncalcified plaque along the aortic arch and origins of the great vessels with narrowing.     CTA head:   There is an abrupt cutoff of the distal left anterior cerebral artery.   Occlusion of the right petrous and cavernous carotid arteries with reconstitution in the supraclinoid segment.   The left intracranial carotid artery is patent with coarse atherosclerotic calcification and mild narrowing.   The posterior circulation is patent without significant stenosis.        CT HEAD:   No acute intracranial hemorrhage or mass effect.     ---     CT head wo IV contrast 11/11/2024 9:03 pm     FINDINGS:   CSF Spaces: Ventricles, cortical sulci and basal cisterns are prominent reflecting age related involutional changes and mild volume loss. Mildly prominent extra-axial CSF space within the bifrontal region may represent  asymmetric volume loss. There is no extraaxial fluid collection.     Parenchyma: There is no acute intraparenchymal hemorrhage or parenchymal evidence of acute large territorial ischemic infarction. Patchy white matter hypodensity likely represents microangiopathy. Lucency within the right basal ganglia likely represents a lacunar infarct. Small area of encephalomalacia and gliosis within the right occipital lobe. Of note multiple intracranial vessels are hyperdense, likely secondary to recent IR neuro angiogram. The grey-white differentiation is intact. There is no mass effect or midline shift.     Calvarium: The calvarium is unremarkable.     Paranasal sinuses and mastoids: Visualized paranasal sinuses and mastoids are clear.        No acute intracranial hemorrhage or mass effect.   Small focal area of encephalomalacia and gliosis within the right occipital lobe.   Mild white matter changes compatible with microangiopathy. Lacunar infarct within the right basal ganglia.                     Assessment/Plan   This patient currently has cardiac telemetry ordered; if you would like to modify or discontinue the telemetry order, click here to go to the orders activity to modify/discontinue the order.  Assessment & Plan  Cerebrovascular accident (CVA) due to occlusion of left middle cerebral artery (Multi)      Assessment:    Ms. Zulma Rao is an 80 YO RH white F with history of stroke in 7/2024, HTN, HLD, R CEA 2011, HFpEF, COPD, anxiety, partial hepatectomy for HCC 2019 transferred from [facility]  for acute left MCA infarct with NIH 21, s/p TNK. MT performed, but patient's left had already recanalized.    Admitted to [facility] for continued monitoring on 11/11 with NIH of 4 with small stroke burden largely in L  MCA-DAKOTA watershed.  Noted to have deterioration in neurological examination (NIH 13) when SBP below 170-180 and started on vasopressor therapy.    Noted to again deteriorate in terms of R arm strength  when off vasopressor therapy with repeat MRI brain on 11/14 demonstrating increased in stroke burden in the L MCA-DAKOTA watershed. Pressor therapy restarted.    Patient's  clarified she has been on DAPT since 07/2024 and therefore we conclude that she is essentially failing maximal medical therapy .    NSGY consulted for L CEA given 65% stenosis and 100% occlusion of the R ICA on CTA head/neck. Tentatively planning for OR on 11/18/24.     Type: Ischemic stroke  Subtype/etiology: Artery to artery  Vessels involved: left MCA/DAKOTA watershed  Neurological manifestations:  NIHSS (worst at presentation): 21   Antiplatelet/antithrombotic plan for stroke prevention: Aspirin; clopidogrel (currently HELD prior to 11/18 L CEA)  VTE prophylaxis: SCDs, Heparin 5000 units Q8hr     Vascular Risk Factor modification goals:  Blood pressure goals: avoid hypotension SBP <100 that could worsen cerebral perfusion, Ischemic stroke post-thrombectomy   Lipid Goals: education on healthy diet and statin therapy to maintain or achieve goal LDL-cholesterol < 70mg  Glucose Goals: early treatment of hyperglycemia to goal glucose 140-180 mg/dl with long-term goal A1c < 7%   Smoking Cessation and Education  Assessment for Rehabilitation needs   Patient and family education on signs and symptoms of stroke, calling 911, healthy strategies for stroke prevention.       Plan:     # Acute Left MCA infarct s/p TNK (11/11 @ 1255) and MT (already recanalized- no Tici scoring)  : : LDL 92; A1c 5.8%  : : NIHSS 21 at time of transfer to [facility]--> 5 this morning.     : : MRI brain w/o contrast with L cerebral hemisphere acute-subacute ischemia; subcentimeter cortical infarcts of L superior frontal, parietal, annd temporal lobes. 11/14: Stroke burden increased in size within the same distribution as prior comparison 11/13.  : : CTA with atherosclerotic calcification at the left carotid bifurcation with approximately 65% stenosis by NASCET criteria  : : TTE:  Bubble study negative. LVEF 65-70%. No thrombus. Severe LA dilatation.   : : cvEEG with no epileptiform activity.     Recommendations:    - Q1 neuro checks, remain NSU status for vasopressor support  - BP goal 180-200 until carotid revascularization  - ASA 81 mg daily  - Hold Plavix for upcoming carotid revascularization  - DVT Ppx: continue SCDs, ctn Q8hr subcutaneous heparin   - Ctn atorvastatin 80mg  - NSGY consulted for carotid revascularization, currently planned for 11/18/2024 - will need EEG leads placed prior to surgery. Cardiology - RCRI 3, elevated risk but not prohibitive        José Miguel Flores, MS3    Kenneth Marie MD  PGY-4 Neurology  Stroke [id number]

## 2024-11-16 NOTE — CARE PLAN
Problem: Pain - Adult  Goal: Verbalizes/displays adequate comfort level or baseline comfort level  Outcome: Progressing     Problem: Safety - Adult  Goal: Free from fall injury  Outcome: Progressing     Problem: Discharge Planning  Goal: Discharge to home or other facility with appropriate resources  Outcome: Progressing     Problem: Chronic Conditions and Co-morbidities  Goal: Patient's chronic conditions and co-morbidity symptoms are monitored and maintained or improved  Outcome: Progressing     Problem: Fall/Injury  Goal: Not fall by end of shift  Outcome: Progressing  Goal: Be free from injury by end of the shift  Outcome: Progressing  Goal: Verbalize understanding of personal risk factors for fall in the hospital  Outcome: Progressing  Goal: Verbalize understanding of risk factor reduction measures to prevent injury from fall in the home  Outcome: Progressing  Goal: Use assistive devices by end of the shift  Outcome: Progressing  Goal: Pace activities to prevent fatigue by end of the shift  Outcome: Progressing     Problem: Pain  Goal: Takes deep breaths with improved pain control throughout the shift  Outcome: Progressing  Goal: Turns in bed with improved pain control throughout the shift  Outcome: Progressing  Goal: Walks with improved pain control throughout the shift  Outcome: Progressing  Goal: Performs ADL's with improved pain control throughout shift  Outcome: Progressing  Goal: Participates in PT with improved pain control throughout the shift  Outcome: Progressing  Goal: Free from opioid side effects throughout the shift  Outcome: Progressing  Goal: Free from acute confusion related to pain meds throughout the shift  Outcome: Progressing     Problem: General Stroke  Goal: Establish a mutual long term goal with patient by discharge  Outcome: Progressing  Goal: Demonstrate improvement in neurological exam throughout the shift  Outcome: Progressing  Goal: Maintain BP within ordered limits throughout  shift  Outcome: Progressing  Goal: Participate in treatment (ie., meds, therapy) throughout shift  Outcome: Progressing  Goal: No symptoms of aspiration throughout shift  Outcome: Progressing  Goal: No symptoms of hemorrhage throughout shift  Outcome: Progressing  Goal: Tolerate enteral feeding throughout shift  Outcome: Progressing  Goal: Decreased nausea/vomiting throughout shift  Outcome: Progressing  Goal: Controlled blood glucose throughout shift  Outcome: Progressing  Goal: Out of bed three times today  Outcome: Progressing     Problem: ICU Stroke  Goal: Maintain ICP within ordered limits throughout shift  Outcome: Progressing  Goal: Tolerate EVD clamping trial throughout shift  Outcome: Progressing  Goal: Tolerate ventilator weaning trial during shift  Outcome: Progressing  Goal: Maintain patent airway throughout shift  Outcome: Progressing  Goal: Achieve/maintain targeted sodium level throughout shift  Outcome: Progressing     Problem: Skin  Goal: Decreased wound size/increased tissue granulation at next dressing change  Outcome: Progressing  Goal: Participates in plan/prevention/treatment measures  Outcome: Progressing  Goal: Prevent/manage excess moisture  Outcome: Progressing  Goal: Prevent/minimize sheer/friction injuries  Outcome: Progressing  Goal: Promote/optimize nutrition  Outcome: Progressing  Goal: Promote skin healing  Outcome: Progressing   The patient's goals for the shift include      The clinical goals for the shift include      Over the shift, the patient did not make progress toward the following goals. Barriers to progression include continued pressor usage and pain management. Recommendations to address these barriers include adding medications to increase blood pressure and continue comfort measures.

## 2024-11-17 VITALS
HEART RATE: 82 BPM | DIASTOLIC BLOOD PRESSURE: 83 MMHG | BODY MASS INDEX: 19.16 KG/M2 | OXYGEN SATURATION: 99 % | WEIGHT: 112.21 LBS | TEMPERATURE: 96.6 F | RESPIRATION RATE: 19 BRPM | SYSTOLIC BLOOD PRESSURE: 174 MMHG | HEIGHT: 64 IN

## 2024-11-17 LAB
ABO GROUP (TYPE) IN BLOOD: NORMAL
ALBUMIN SERPL BCP-MCNC: 3.5 G/DL (ref 3.4–5)
ANION GAP SERPL CALC-SCNC: 14 MMOL/L (ref 10–20)
ANTIBODY SCREEN: NORMAL
APTT PPP: 27 SECONDS (ref 27–38)
ATRIAL RATE: 283 BPM
BASOPHILS # BLD AUTO: 0.08 X10*3/UL (ref 0–0.1)
BASOPHILS NFR BLD AUTO: 0.9 %
BUN SERPL-MCNC: 7 MG/DL (ref 6–23)
CA-I BLD-SCNC: 1.15 MMOL/L (ref 1.1–1.33)
CALCIUM SERPL-MCNC: 8.6 MG/DL (ref 8.6–10.6)
CHLORIDE SERPL-SCNC: 103 MMOL/L (ref 98–107)
CO2 SERPL-SCNC: 29 MMOL/L (ref 21–32)
CREAT SERPL-MCNC: 0.41 MG/DL (ref 0.5–1.05)
EGFRCR SERPLBLD CKD-EPI 2021: >90 ML/MIN/1.73M*2
EOSINOPHIL # BLD AUTO: 0.65 X10*3/UL (ref 0–0.4)
EOSINOPHIL NFR BLD AUTO: 7 %
ERYTHROCYTE [DISTWIDTH] IN BLOOD BY AUTOMATED COUNT: 13.6 % (ref 11.5–14.5)
GLUCOSE BLD MANUAL STRIP-MCNC: 114 MG/DL (ref 74–99)
GLUCOSE BLD MANUAL STRIP-MCNC: 115 MG/DL (ref 74–99)
GLUCOSE BLD MANUAL STRIP-MCNC: 120 MG/DL (ref 74–99)
GLUCOSE BLD MANUAL STRIP-MCNC: 241 MG/DL (ref 74–99)
GLUCOSE SERPL-MCNC: 124 MG/DL (ref 74–99)
HCT VFR BLD AUTO: 31.1 % (ref 36–46)
HGB BLD-MCNC: 10.6 G/DL (ref 12–16)
IMM GRANULOCYTES # BLD AUTO: 0.04 X10*3/UL (ref 0–0.5)
IMM GRANULOCYTES NFR BLD AUTO: 0.4 % (ref 0–0.9)
INR PPP: 1.1 (ref 0.9–1.1)
LYMPHOCYTES # BLD AUTO: 1.54 X10*3/UL (ref 0.8–3)
LYMPHOCYTES NFR BLD AUTO: 16.6 %
MAGNESIUM SERPL-MCNC: 2 MG/DL (ref 1.6–2.4)
MCH RBC QN AUTO: 29.6 PG (ref 26–34)
MCHC RBC AUTO-ENTMCNC: 34.1 G/DL (ref 32–36)
MCV RBC AUTO: 87 FL (ref 80–100)
MONOCYTES # BLD AUTO: 1.19 X10*3/UL (ref 0.05–0.8)
MONOCYTES NFR BLD AUTO: 12.8 %
NEUTROPHILS # BLD AUTO: 5.79 X10*3/UL (ref 1.6–5.5)
NEUTROPHILS NFR BLD AUTO: 62.3 %
NRBC BLD-RTO: 0 /100 WBCS (ref 0–0)
PHOSPHATE SERPL-MCNC: 3.6 MG/DL (ref 2.5–4.9)
PLATELET # BLD AUTO: 218 X10*3/UL (ref 150–450)
POTASSIUM SERPL-SCNC: 3.9 MMOL/L (ref 3.5–5.3)
PROTHROMBIN TIME: 12.6 SECONDS (ref 9.8–12.8)
Q ONSET: 221 MS
QRS COUNT: 22 BEATS
QRS DURATION: 68 MS
QT INTERVAL: 320 MS
QTC CALCULATION(BAZETT): 483 MS
QTC FREDERICIA: 421 MS
R AXIS: 29 DEGREES
RBC # BLD AUTO: 3.58 X10*6/UL (ref 4–5.2)
RH FACTOR (ANTIGEN D): NORMAL
SODIUM SERPL-SCNC: 142 MMOL/L (ref 136–145)
T AXIS: -37 DEGREES
T OFFSET: 381 MS
VENTRICULAR RATE: 137 BPM
WBC # BLD AUTO: 9.3 X10*3/UL (ref 4.4–11.3)

## 2024-11-17 PROCEDURE — 86901 BLOOD TYPING SEROLOGIC RH(D): CPT

## 2024-11-17 PROCEDURE — 2500000004 HC RX 250 GENERAL PHARMACY W/ HCPCS (ALT 636 FOR OP/ED)

## 2024-11-17 PROCEDURE — 99221 1ST HOSP IP/OBS SF/LOW 40: CPT | Performed by: PSYCHIATRY & NEUROLOGY

## 2024-11-17 PROCEDURE — 80069 RENAL FUNCTION PANEL: CPT

## 2024-11-17 PROCEDURE — 2500000002 HC RX 250 W HCPCS SELF ADMINISTERED DRUGS (ALT 637 FOR MEDICARE OP, ALT 636 FOR OP/ED)

## 2024-11-17 PROCEDURE — 94640 AIRWAY INHALATION TREATMENT: CPT

## 2024-11-17 PROCEDURE — 2500000001 HC RX 250 WO HCPCS SELF ADMINISTERED DRUGS (ALT 637 FOR MEDICARE OP)

## 2024-11-17 PROCEDURE — 99233 SBSQ HOSP IP/OBS HIGH 50: CPT | Performed by: STUDENT IN AN ORGANIZED HEALTH CARE EDUCATION/TRAINING PROGRAM

## 2024-11-17 PROCEDURE — 85610 PROTHROMBIN TIME: CPT | Performed by: STUDENT IN AN ORGANIZED HEALTH CARE EDUCATION/TRAINING PROGRAM

## 2024-11-17 PROCEDURE — 2020000001 HC ICU ROOM DAILY

## 2024-11-17 PROCEDURE — 83735 ASSAY OF MAGNESIUM: CPT

## 2024-11-17 PROCEDURE — 37799 UNLISTED PX VASCULAR SURGERY: CPT | Performed by: STUDENT IN AN ORGANIZED HEALTH CARE EDUCATION/TRAINING PROGRAM

## 2024-11-17 PROCEDURE — 85025 COMPLETE CBC W/AUTO DIFF WBC: CPT

## 2024-11-17 PROCEDURE — 82330 ASSAY OF CALCIUM: CPT | Performed by: STUDENT IN AN ORGANIZED HEALTH CARE EDUCATION/TRAINING PROGRAM

## 2024-11-17 PROCEDURE — 2500000005 HC RX 250 GENERAL PHARMACY W/O HCPCS

## 2024-11-17 PROCEDURE — 2500000004 HC RX 250 GENERAL PHARMACY W/ HCPCS (ALT 636 FOR OP/ED): Performed by: STUDENT IN AN ORGANIZED HEALTH CARE EDUCATION/TRAINING PROGRAM

## 2024-11-17 PROCEDURE — 99291 CRITICAL CARE FIRST HOUR: CPT | Performed by: NEUROLOGICAL SURGERY

## 2024-11-17 PROCEDURE — 82947 ASSAY GLUCOSE BLOOD QUANT: CPT

## 2024-11-17 PROCEDURE — 2500000005 HC RX 250 GENERAL PHARMACY W/O HCPCS: Performed by: REGISTERED NURSE

## 2024-11-17 RX ADMIN — BUSPIRONE HYDROCHLORIDE 10 MG: 10 TABLET ORAL at 20:32

## 2024-11-17 RX ADMIN — ASPIRIN 81 MG CHEWABLE TABLET 81 MG: 81 TABLET CHEWABLE at 20:41

## 2024-11-17 RX ADMIN — BUSPIRONE HYDROCHLORIDE 10 MG: 10 TABLET ORAL at 08:59

## 2024-11-17 RX ADMIN — HEPARIN SODIUM 5000 UNITS: 5000 INJECTION INTRAVENOUS; SUBCUTANEOUS at 00:34

## 2024-11-17 RX ADMIN — SODIUM CHLORIDE, POTASSIUM CHLORIDE, SODIUM LACTATE AND CALCIUM CHLORIDE 500 ML: 600; 310; 30; 20 INJECTION, SOLUTION INTRAVENOUS at 03:28

## 2024-11-17 RX ADMIN — MELATONIN 3 MG: 3 TAB ORAL at 20:32

## 2024-11-17 RX ADMIN — LEVOTHYROXINE SODIUM 25 MCG: 25 TABLET ORAL at 06:06

## 2024-11-17 RX ADMIN — INSULIN LISPRO 2 UNITS: 100 INJECTION, SOLUTION INTRAVENOUS; SUBCUTANEOUS at 12:19

## 2024-11-17 RX ADMIN — MIDODRINE HYDROCHLORIDE 5 MG: 5 TABLET ORAL at 08:59

## 2024-11-17 RX ADMIN — LIDOCAINE 1 PATCH: 4 PATCH TOPICAL at 09:00

## 2024-11-17 RX ADMIN — TIOTROPIUM BROMIDE INHALATION SPRAY 2 PUFF: 3.12 SPRAY, METERED RESPIRATORY (INHALATION) at 10:25

## 2024-11-17 RX ADMIN — HYDROMORPHONE HYDROCHLORIDE 0.2 MG: 1 INJECTION, SOLUTION INTRAMUSCULAR; INTRAVENOUS; SUBCUTANEOUS at 00:38

## 2024-11-17 RX ADMIN — HEPARIN SODIUM 5000 UNITS: 5000 INJECTION INTRAVENOUS; SUBCUTANEOUS at 08:59

## 2024-11-17 RX ADMIN — BUSPIRONE HYDROCHLORIDE 10 MG: 10 TABLET ORAL at 14:04

## 2024-11-17 RX ADMIN — MIDODRINE HYDROCHLORIDE 5 MG: 5 TABLET ORAL at 16:13

## 2024-11-17 RX ADMIN — PANTOPRAZOLE SODIUM 40 MG: 40 INJECTION, POWDER, FOR SOLUTION INTRAVENOUS at 06:05

## 2024-11-17 RX ADMIN — ATORVASTATIN CALCIUM 80 MG: 80 TABLET, FILM COATED ORAL at 20:32

## 2024-11-17 RX ADMIN — FLUTICASONE FUROATE AND VILANTEROL TRIFENATATE 1 PUFF: 100; 25 POWDER RESPIRATORY (INHALATION) at 10:25

## 2024-11-17 RX ADMIN — HEPARIN SODIUM 5000 UNITS: 5000 INJECTION INTRAVENOUS; SUBCUTANEOUS at 16:13

## 2024-11-17 RX ADMIN — MIDODRINE HYDROCHLORIDE 5 MG: 5 TABLET ORAL at 12:35

## 2024-11-17 ASSESSMENT — PAIN SCALES - GENERAL
PAINLEVEL_OUTOF10: 0 - NO PAIN
PAINLEVEL_OUTOF10: 0 - NO PAIN
PAINLEVEL_OUTOF10: 3
PAINLEVEL_OUTOF10: 0 - NO PAIN
PAINLEVEL_OUTOF10: 9
PAINLEVEL_OUTOF10: 0 - NO PAIN

## 2024-11-17 ASSESSMENT — PAIN DESCRIPTION - DESCRIPTORS: DESCRIPTORS: ACHING;DISCOMFORT

## 2024-11-17 ASSESSMENT — PAIN DESCRIPTION - LOCATION: LOCATION: BUTTOCKS

## 2024-11-17 NOTE — PROGRESS NOTES
[facility]  NEUROSCIENCE INTENSIVE CARE UNIT  DAILY PROGRESS NOTE       Patient Name: Zulma Rao   MRN: [id number]     Admit Date: 2024     : 1945 AGE: 79 y.o. GENDER: female        Subjective    79 y.o. female with PMH prior R sided stroke with left sided deficit (per personal review of imaging, L sided posterior limb of internal capsule) 2024, bilateral carotid artery stenosis S/P R CEA , HFpEF, NSTEMI, T2DM, HT, DLD, hypothyroidism, COPD, HCC S/P partial hepatectomy (), anxiety, b/l cataracts.  Admitted 2024 with Cerebrovascular accident (CVA) due to occlusion of left middle cerebral artery (Multi) after transferred for mechanical thrombectomy.     History obtained from  Mynor, who reported that pt was home for lunch today, last known well was 12pm. She was standing in the kitchen, and suddenly started having whole body shaking. He was able to catch her before falling to ground. Noticed that she had left sided facial droop that was new and pt was looking to the left, not speaking or communicating with him. He called 911 and EMS brought her to the [facility] ED. NIH on arrival was 20. BP on arrival was:     CTH completed showing dense left MCA sign. CTA head and neck completed showing proximal left M1 occlusion and what appears to be chronic right ICA occlusion. She was given TNK at 12:55. Life flighted to [facility] for MT pamelaal. NIH on arrival was 21 (breakdown below), BP on arrival was 130/93 with BG 93. Mechanical thrombectomy completed and post NIH score was:  Admitted to [facility].      Pt's  reports history of stroke in July of this year for which she originally had left sided weakness and facial droop that completely resolved and was back to her normal baseline prior to stroke. Was able to ambulate independently, but then fell in September and broke her right hip requiring surgical fixation. She was doing well, walking with walker for long distance but  recently has been ambulating independently.  reported no history of arrhythmias and reports pt was taking daily plavix.     Significant Events:  - 11/11: TNK and spontaneous recanalization on angiogram TICI 3  - 11/13: Had pressure-dependent exam, placed on Levo and Miguel. NIH 4  - 11/14: Repeat MRI, increased infarction size, keep -200  - 11/15: Start midodrine for BP control    Interval Events:   Overnight: NAEON, increased levo to 0.1  AM: Denies any pain or discomfort.     Objective   VITALS (24H):  Temp:  [36.2 °C (97.2 °F)-36.6 °C (97.9 °F)] 36.2 °C (97.2 °F)  Heart Rate:  [] 66  Resp:  [12-25] 12  BP: (154-169)/(58-88) 154/58  Arterial Line BP 1: (173-202)/(58-77) 187/72  INTAKE/OUTPUT:  Intake/Output Summary (Last 24 hours) at 11/17/2024 0721  Last data filed at 11/17/2024 0600  Gross per 24 hour   Intake 1253.11 ml   Output 2165 ml   Net -911.89 ml     VENT SETTINGS:        PHYSICAL EXAM:  NEURO:   - Alert, oriented x3, follows simple commands  - EOS, PERRL, EOMI, VFF  - NIHSS as below  CV:  - RRR on telemetry, NSR  RESP:  - No signs of resp distress  - Oxygen: Nasal cannula 2.5L  :  - External catheter in place  GI:  - Abdomen NT/ND, soft  SKIN:  - Intact    11/17/24 @ 8:00AM   NIHSS:   - Level of consciousness: 0 = Alert  - LOC Question: 0 = Both correct  - LOC Commands: 0 = Obeys both  - Best Gaze: 0 = Normal  - Visual Field: 0 = No visual loss  - Facial Paresis: 0 = Normal  - LUE: 0 = No drift; 10 sec  - RUE: 2 = Effort vs gravity  - LLE: 0 = No drift; 5 sec  - RLE: 1 = Drift  - Limb Ataxia: 0 = Absent  - Sensory: 0 = Absent  - Best Language: 0 = No aphasia  - Dysarthria: 0 = Normal  - Neglect: 0 = None  TOTAL: 3       MEDICATIONS:  Scheduled: PRN: Continuous:   aspirin, 81 mg, oral, Daily  atorvastatin, 80 mg, oral, Nightly  azithromycin, 250 mg, oral, Once per day on Monday Wednesday Friday  busPIRone, 10 mg, oral, TID  [Held by provider] clopidogrel, 75 mg, oral,  Daily  pantoprazole, 40 mg, oral, Daily before breakfast   Or  esomeprazole, 40 mg, nasoduodenal tube, Daily before breakfast   Or  pantoprazole, 40 mg, intravenous, Daily before breakfast  tiotropium, 2 puff, inhalation, Daily   And  fluticasone furoate-vilanteroL, 1 puff, inhalation, Daily  heparin (porcine), 5,000 Units, subcutaneous, q8h  insulin lispro, 0-5 Units, subcutaneous, TID AC  levothyroxine, 25 mcg, oral, Daily  lidocaine, 1 patch, transdermal, Daily  melatonin, 3 mg, oral, Nightly  midodrine, 5 mg, oral, TID  polyethylene glycol, 17 g, oral, Daily  sennosides, 2 tablet, oral, BID     PRN medications: acetaminophen, alteplase, calcium gluconate, calcium gluconate, dextrose, dextrose, glucagon, glucagon, HYDROmorphone, ipratropium-albuteroL, magnesium sulfate, magnesium sulfate, ondansetron ODT **OR** ondansetron, oxygen, potassium chloride CR **OR** potassium chloride, potassium chloride CR **OR** potassium chloride, potassium chloride norepinephrine, 0-0.5 mcg/kg/min, Last Rate: 0.1 mcg/kg/min (11/17/24 0600)         LAB RESULTS:  Results from last 72 hours   Lab Units 11/17/24 0215 11/16/24  0134   GLUCOSE mg/dL 124* 100*   SODIUM mmol/L 142 142   POTASSIUM mmol/L 3.9 3.3*   CHLORIDE mmol/L 103 106   CO2 mmol/L 29 26   ANION GAP mmol/L 14 13   BUN mg/dL 7 8   CREATININE mg/dL 0.41* 0.39*   EGFR mL/min/1.73m*2 >90 >90   CALCIUM mg/dL 8.6 8.3*   PHOSPHORUS mg/dL 3.6 3.3   ALBUMIN g/dL 3.5 3.4   MAGNESIUM mg/dL 2.00 2.11   POCT CALCIUM IONIZED (MMOL/L) IN BLOOD mmol/L 1.15 1.14      Results from last 72 hours   Lab Units 11/17/24 0215 11/16/24  0134   WBC AUTO x10*3/uL 9.3 9.0   NRBC AUTO /100 WBCs 0.0 0.0   RBC AUTO x10*6/uL 3.58* 3.44*   HEMOGLOBIN g/dL 10.6* 10.2*   HEMATOCRIT % 31.1* 31.4*   MCV fL 87 91   MCH pg 29.6 29.7   MCHC g/dL 34.1 32.5   RDW % 13.6 14.0   PLATELETS AUTO x10*3/uL 218 186   NEUTROS PCT AUTO % 62.3 64.7   IG PCT AUTO % 0.4 0.2   LYMPHS PCT AUTO % 16.6 15.5   MONOS PCT AUTO %  12.8 12.0   EOS PCT AUTO % 7.0 6.8   BASOS PCT AUTO % 0.9 0.8   NEUTROS ABS x10*3/uL 5.79* 5.84*   IG AUTO x10*3/uL 0.04 0.02   LYMPHS ABS AUTO x10*3/uL 1.54 1.40   MONOS ABS AUTO x10*3/uL 1.19* 1.08*   EOS ABS AUTO x10*3/uL 0.65* 0.61*   BASOS ABS AUTO x10*3/uL 0.08 0.07      Results from last 72 hours   Lab Units 11/17/24  0215 11/16/24  0134   ALBUMIN g/dL 3.5 3.4      IMAGING RESULTS:  MRI (11/14)  1. Evolving infarcts located within the left cerebral hemisphere,  including in the watershed distribution, overall more pronounced in  appearance but similar in extent with no hemorrhagic transformation  or surrounding mass effect.  2. Additional evolving acute infarcts are seen located within the  bilateral cerebral hemispheres and are stable to slightly more  pronounced in appearance. Few new punctate acute infarcts located  within the left cerebral hemisphere. There is no surrounding mass  effect or hemorrhagic transformation.    MRI (11/13)  1. Acute/subacute watershed ischemia throughout the left cerebral  hemisphere  2. Subcentimeter cortical infarctions involving the left superior  frontal lobe but also involving the left parietal and temporal lobes.  No evidence of hemorrhagic transformation.  3. Nonspecific white matter changes which can be seen in the setting  of chronic microangiopathy.    Reviewed OSH imaging on PACS  CT head wo IV contrast    Result Date: 11/12/2024  CTA neck:   Complete occlusion of the right common carotid artery and right cervical internal carotid artery.   Coarse atherosclerotic calcification at the left carotid bifurcation with approximately 65% stenosis by NASCET criteria.   The vertebral arteries are patent. The left vertebral artery is dominant. There is a coarse calcification at the origin of the right vertebral artery.   Calcified and noncalcified plaque along the aortic arch and origins of the great vessels with narrowing.   CTA head:   There is an abrupt cutoff of the distal  left anterior cerebral artery.   Occlusion of the right petrous and cavernous carotid arteries with reconstitution in the supraclinoid segment.   The left intracranial carotid artery is patent with coarse atherosclerotic calcification and mild narrowing.   The posterior circulation is patent without significant stenosis.   CT head:   No acute intracranial hemorrhage or mass effect.   I personally reviewed the images/study and I agree with the findings as stated by Simone Joy MD (Radiology Resident).   MACRO: Simone Joy discussed the significance and urgency of this critical finding by telephone with  Jeaneth Graves MD on 11/12/2024 at 5:59 am.  (**-RCF-**) Findings:  See findings.   .   Signed by: Latesha Zuniga 11/12/2024 7:20 AM Dictation workstation:   [id number]    CT angio head and neck w and wo IV contrast    Result Date: 11/12/2024  CTA neck:   Complete occlusion of the right common carotid artery and right cervical internal carotid artery.   Coarse atherosclerotic calcification at the left carotid bifurcation with approximately 65% stenosis by NASCET criteria.   The vertebral arteries are patent. The left vertebral artery is dominant. There is a coarse calcification at the origin of the right vertebral artery.   Calcified and noncalcified plaque along the aortic arch and origins of the great vessels with narrowing.   CTA head:   There is an abrupt cutoff of the distal left anterior cerebral artery.   Occlusion of the right petrous and cavernous carotid arteries with reconstitution in the supraclinoid segment.   The left intracranial carotid artery is patent with coarse atherosclerotic calcification and mild narrowing.   The posterior circulation is patent without significant stenosis.   CT head:   No acute intracranial hemorrhage or mass effect.   I personally reviewed the images/study and I agree with the findings as stated by Simone Joy MD (Radiology  Resident).   MACRO: Simone Joy discussed the significance and urgency of this critical finding by telephone with  Jeaneth Graves MD on 11/12/2024 at 5:59 am.  (**-RCF-**) Findings:  See findings.   .   Signed by: Latesha Zuniga 11/12/2024 7:20 AM Dictation workstation:   [id number]    CT head wo IV contrast    Result Date: 11/12/2024  No acute intracranial hemorrhage or mass effect.   Small focal area of encephalomalacia and gliosis within the right occipital lobe.   Mild white matter changes compatible with microangiopathy. Lacunar infarct within the right basal ganglia.   I personally reviewed the images/study and I agree with the findings as stated by Simone Joy MD (Radiology Resident).   MACRO: None   Signed by: Latesha Zuniga 11/12/2024 7:03 AM Dictation workstation:   [id number]       Assessment/Plan    Update 11/17/24  - [place] following, per note: BP goal 180-200 until carotid revascularization, c/w ASA, hold plavix, continue SCDs and Q8h subcutaneous heparin, ctn atorvastatin 80mg  - NSGY following per note: OR Mon 11/18 for L CEA, will need EEG leads connected prior to OR, hold plavix, continue ASA, SBP goal 180-200 per stroke, card res - RCRI 3, elevated risk but not prohibitive  - c/w pressors    To follow up  - EEG start 11/17 (prior to OR)    Dispo:  Stroke  PT/OT: PT - moderate intensity, OT - moderate intensity  - Contact vas Sx fellow over chat closer to discharge to coordinate follow up PAD with aortic occlusion  -----    NEURO:  # L MCA stroke S/P TNK, Spontaneous recanalization TICI 3  # R sided stroke with left sided deficit (per personal review of imaging L sided posterior limb of internal capsule) 7/2024  # Bilateral carotid artery stenosis S/P R CEA 2011  Assessment:  - Neurologically: see exam above  - Fluctuating exams at times  - LDL (11/11) 92, A1c (11/11) 5.8  - BNP (11/11) 188  - CTA HN (11/12): complete occlusion of R CCA and R cervical ICA. 65% stenosis  of L   Carotid bifurcation, abrupt cutoff of the distal L DAK[place], occlusion of the right petrous and cavernous carotid arteries  - MRI Brain (11/12): Acute/subacute watershed ischemia throughout the left cerebral hemisphere, subcentimeter cortical infarctions upon the left superior frontal lobe, left parietal and temporal lobes  - CTA HN (11/13): Interval increased CT conspicuity, unchanged occlusion of the left DAK[place] callosal marginal branch, R CCA, R ICA, unchanged flow stenosis in L ICA, L CCA, R>L subclavian artery  - vEEG (final): indicative of left hemispheric structural lesion. No epileptiform discharges are noted seen  - MRI (11/14): evolving infacts within L cerebral hemisphere, including in the watershed distribution, more pronounced but similar in extent transformation. Additional evolving acute infarcts within the bilateral cerebral hemispheres. Few new punctate acute infarcts located within the left cerebral hemisphere.   Plan:  - NSU  - Neuro Checks: Q2H  - Sedation: None  - Pain: acetaminophen PRN, Q6H  - Nausea: ondansetron  - PT/OT/SLP  - c/w home Atorvastatin 80mg  - c/w aspirin 81mg   - Hold plavix 75mg  - Cardiology signed off, per note: no cardiac contraindications to planned procedure, no further cardiac work up warranted  - [place] following, per note: BP goal 180-200 until carotid revascularization, c/w ASA, hold plavix, continue SCDs and Q8h subcutaneous heparin, ctn atorvastatin 80mg  - NSGY following per note: OR Mon 11/18 for L CEA, will need EEG leads connected prior to OR, hold plavix, continue ASA, SBP goal 180-200 per stroke, card res - RCRI 3, elevated risk but not prohibitive  - EEG at midnight prior to OR  - NPO AMN, heparin held AMN    CARDIOVASCULAR:  # Severe PAD 2/2 Chronic infrarenal aortic occlusion  # HFpEF  # History of HTN, HLD  Assessment:  - EKG: pending  - ECHO 11/12/24: LVEF 65-70%, LVH, severely dilated LA, negative bubble study  - CT Aorti-ileofemoral w/ runoff: Extensive  vascular disease including occlusion of the infrarenal abdominal aorta, bilateral common iliac arteries, no flow appreciated left PT   Plan:  - Continue to monitor on telemetry  - BP goal: 180-200  --> on Levo, wean as tolerated  - c/w monitor doppler  - Vasc Sx signed off, per note: no acute intervention . Good flow to radial artery. Chronic infrarenal aortic occlusion. Continue to monitor doppler signals  - c/w midodrine 5mg TID    RESPIRATORY:  # COPD  Assessment:  - PFT (10/05/2023 at OSH): FEV1 50% predicted. FEV1/FVC 69%. No TLC obtained. DLCO 40%   Plan:  - Continuous pulse oximetry   - O2 PRN to maintain SpO2 > 88%, wean as tolerated  - Incentive spirometry while awake  - Continue Trelegy Ellipta (change to inpatient formula)  - Wean O2, maintain SpO2 88-92%    RENAL/:  #JAIME, resolved  Assessment:  - Baseline BUN/Cr: 12/0.63  Results from last 72 hours   Lab Units 11/17/24  0215 11/16/24  0134   BUN mg/dL 7 8   CREATININE mg/dL 0.41* 0.39*       - likely from contrast, now resolved with renal function return to baseline  Plan:  - Monitor with daily RFP  - Maintain external urinary diversion device for strict I&O    FEN/GI:  #HCC S/P partial hepatectomy (2019)  #Oral dysphagia  Assessment:  - Last BM Date: 11/16/24  Plan:  - Monitor and replace electrolytes per protocol  - IVF: None  - Adult diet Cardiac; 70 gm fat; 2 - 3 grams Sodium; Minced and moist 5; Thin 0  - Bowel Regimen: Docusate-Senna BID and Miralax QD  - SLP following, per note: Minced and moist, thin liquid    ENDOCRINE:  # T2DM  # Hypothyroidism  Assessment:  Results from last 7 days   Lab Units 11/17/24  0215 11/16/24  1953 11/16/24  1523 11/16/24  1125 11/16/24  0807 11/16/24  0415 11/16/24  0134 11/15/24  2316   POCT GLUCOSE mg/dL  --  170* 110* 151* 105* 111*  --  110*   GLUCOSE mg/dL 124*  --   --   --   --   --  100*  --    - HbA1C: 6.3 9/2024   Plan:  - Accuchecks & ISS AC&HS  - Hold home metformin  - Continue home Levothyroxine  -  Hypoglycemia monitoring     HEMATOLOGY:  #Chronic anemia  Assessment:  - Baseline Hgb: 11.6  - Baseline Plts: 258  Results from last 72 hours   Lab Units 24  0215 24  0134   WBC AUTO x10*3/uL 9.3 9.0   NRBC AUTO /100 WBCs 0.0 0.0   RBC AUTO x10*6/uL 3.58* 3.44*   HEMOGLOBIN g/dL 10.6* 10.2*   HEMATOCRIT % 31.1* 31.4*   MCV fL 87 91   MCH pg 29.6 29.7   MCHC g/dL 34.1 32.5   RDW % 13.6 14.0   PLATELETS AUTO x10*3/uL 218 186      Plan:  - Continue to monitor with daily CBC and Coag panel    INFECTIOUS DISEASE:  #JOSE  Assessment:  Results from last 7 days   Lab Units 24  0134   WBC AUTO x10*3/uL 9.3 9.0    - Temp (24hrs), Av.3 °C (97.4 °F), Min:36.2 °C (97.2 °F), Max:36.6 °C (97.9 °F)     Plan:  - Continue to monitor for s/sx of infection  - Pan culture for temperature > 38.4 C    MUSCULOSKELETAL:  - No acute issues    SKIN:  - No acute issues  - Turns and skin care per NSU protocol    ACCESS:  - PIV    PROPHYLAXIS:  - DVT Ppx: SCDs and SQH  - GI Ppx: Pantoprazole    RESTRAINTS:  Not indicated/Patient does not meet criteria for restraints    Lashanda Handley MD  Neuro Critical Care    The patient is critically ill with acute left MCA stroke  Neurologically stable  Cont BP support: keep sbp>180  Plan for carotid endarterectomy   Secondary stroke prophylaxis per stroke team: Cont aspirin (plavix held in anticipation of surgery)  Chronic diastolic heart failure  COPD: Cont inhalers  Diabetes: Cont BG control     I have seen and examined the patient.  I have reviewed the patient's laboratory, radiographic, and clinical data.  I have spent 30 minutes providing critical care for the patient.       CARISSA Santo M.D.

## 2024-11-17 NOTE — CARE PLAN
Problem: Pain - Adult  Goal: Verbalizes/displays adequate comfort level or baseline comfort level  Outcome: Progressing     Problem: Safety - Adult  Goal: Free from fall injury  Outcome: Progressing     Problem: General Stroke  Goal: Demonstrate improvement in neurological exam throughout the shift  Outcome: Progressing  Goal: Maintain BP within ordered limits throughout shift  Outcome: Progressing  Goal: No symptoms of aspiration throughout shift  Outcome: Progressing  Goal: No symptoms of hemorrhage throughout shift  Outcome: Progressing  Goal: Controlled blood glucose throughout shift  Outcome: Progressing     Problem: Skin  Goal: Decreased wound size/increased tissue granulation at next dressing change  Outcome: Progressing  Goal: Participates in plan/prevention/treatment measures  Outcome: Progressing  Goal: Prevent/manage excess moisture  Outcome: Progressing  Goal: Prevent/minimize sheer/friction injuries  Outcome: Progressing  Goal: Promote/optimize nutrition  Outcome: Progressing  Goal: Promote skin healing  Outcome: Progressing

## 2024-11-17 NOTE — PROGRESS NOTES
Zulma Rao is a 79 y.o. female on day 6 of admission presenting with Cerebrovascular accident (CVA) due to occlusion of left middle cerebral artery (Multi).    Subjective   No acute events overnight. No new concerns this morning. Electrolytes, fluids repleted ovn per NSU team, still on vasopressor therapy with norepinephrine.      Patient seen eating breakfast with daughter and fiance at bedside. Intermittently complains of BL EL pain. Improved with breakfast and social company.          Objective     Overnight Arterial Line BP Range since 11/16 0000: 172-228 / 54-84  SPO2 range: %  Tachy 122, tachypnea 28 @ 0645        11/17/2024     1:00 AM 11/17/2024     2:00 AM 11/17/2024     3:00 AM 11/17/2024     4:00 AM 11/17/2024     5:00 AM 11/17/2024     6:00 AM 11/17/2024     8:00 AM   Vitals   Systolic    154      Diastolic    58      Heart Rate 82 72 77 79 79 66    Temp       36.6 °C (97.9 °F)   Resp 13 20 12 13 12 12          Physical Exam  Vitals reviewed.   HENT:      Head: Normocephalic and atraumatic.      Mouth/Throat:      Mouth: Mucous membranes are moist.   Eyes:      Extraocular Movements: Extraocular movements intact.   Cardiovascular:      Rate and Rhythm: Normal rate and regular rhythm.      Heart sounds: No murmur heard.  Pulmonary:      Effort: Pulmonary effort is normal.      Breath sounds: Normal breath sounds. No wheezing.   Neurological:      Mental Status: She is alert.     Neurological Exam  Mental Status  Alert. Oriented to person, place, time and situation. Mild dysarthria. No expressive/global/receptive aphasia noted.     Cranial Nerves  CN II: Visual fields intact bilaterally in all quadrants to threat - blinks to threat on exam bilaterally   CN III, IV, VI: Extraocular movements intact bilaterally.   Right pupil: 4 mm, Round. Reactive to light. Reactive to accommodation.   Left pupil: 4 mm, Round. Reactive to light. Reactive to accommodation.  CN V: Facial sensation is normal.  CN  VII: Full and symmetric facial movement.  CN VIII: Hearing grossly intact to conversation.  CN IX, X: Palate elevates symmetrically  CN XI:  Right: Sternocleidomastoid strength is normal. Trapezius strength is 1/5.  Left: Sternocleidomastoid strength is normal. Trapezius strength is normal.  CN XII: Tongue midline without atrophy or fasciculations.     Motor  No fasciculations present.  Moves all extremities.   LUE: No drift after 10 seconds  LLE: No drift after 5 seconds  RUE: strong  but unable to lift antigravity,   RLE: Drift on exam before 5 seconds     Sensory  Sensory  Extremity sensation grossly intact and symmetric to light touch bilaterally.      Coordination  Left: Finger-to-nose normal.  R finger to nose unable to assess.    NIHSS    1a  Level of consciousness: 0=alert; keenly responsive   1b. LOC questions:  0=Performs both tasks correctly   1c. LOC commands: 0=Performs both tasks correctly   2.  Best Gaze: 0=normal   3. Visual: 0=No visual loss   4. Facial Palsy: 0=Normal symmetric movement   5a. Motor Left Arm: 0=No drift, limb holds 90 (or 45) degrees for full 10 seconds   5b.  Motor Right Arm: 3=No effort against gravity, limb falls   6a. Motor Left Le=No drift, limb holds 90 (or 45) degrees for full 10 seconds   6b  Motor Right Le=Drift, limb holds 90 (or 45) degrees but drifts down before full 10 seconds: does not hit bed   7. Limb Ataxia: 0=Absent   8.  Sensory: 0=Normal; no sensory loss   9. Best Language:  0=No aphasia, normal   10. Dysarthria: 1=Mild to moderate, patient slurs at least some words and at worst, can be understood with some difficulty   11. Extinction and Inattention: 0=No abnormality          Total:   5     Scheduled medications  aspirin, 81 mg, oral, Daily  atorvastatin, 80 mg, oral, Nightly  azithromycin, 250 mg, oral, Once per day on   busPIRone, 10 mg, oral, TID  [Held by provider] clopidogrel, 75 mg, oral, Daily  pantoprazole, 40 mg,  oral, Daily before breakfast   Or  esomeprazole, 40 mg, nasoduodenal tube, Daily before breakfast   Or  pantoprazole, 40 mg, intravenous, Daily before breakfast  tiotropium, 2 puff, inhalation, Daily   And  fluticasone furoate-vilanteroL, 1 puff, inhalation, Daily  heparin (porcine), 5,000 Units, subcutaneous, q8h  insulin lispro, 0-5 Units, subcutaneous, TID AC  levothyroxine, 25 mcg, oral, Daily  lidocaine, 1 patch, transdermal, Daily  melatonin, 3 mg, oral, Nightly  midodrine, 5 mg, oral, TID  polyethylene glycol, 17 g, oral, Daily  sennosides, 2 tablet, oral, BID      Continuous medications  norepinephrine, 0-0.5 mcg/kg/min, Last Rate: 0.1 mcg/kg/min (11/17/24 0731)      PRN medications  PRN medications: acetaminophen, alteplase, calcium gluconate, calcium gluconate, dextrose, dextrose, glucagon, glucagon, HYDROmorphone, ipratropium-albuteroL, magnesium sulfate, magnesium sulfate, ondansetron ODT **OR** ondansetron, oxygen, potassium chloride CR **OR** potassium chloride, potassium chloride CR **OR** potassium chloride, potassium chloride    Results from last 72 hours   Lab Units 11/17/24  0215 11/16/24  0134 11/15/24  0026 11/15/24  0025   SODIUM mmol/L 142 142 143  --    POTASSIUM mmol/L 3.9 3.3* 3.1*  --    BUN mg/dL 7 8 5*  --    CREATININE mg/dL 0.41* 0.39* 0.32*  --    CALCIUM mg/dL 8.6 8.3* 8.1*  --    MAGNESIUM mg/dL 2.00 2.11  --  2.26      Results from last 72 hours   Lab Units 11/17/24  0215 11/16/24  0134 11/15/24  0025   WBC AUTO x10*3/uL 9.3 9.0 9.1   HEMOGLOBIN g/dL 10.6* 10.2* 9.9*   HEMATOCRIT % 31.1* 31.4* 29.1*   PLATELETS AUTO x10*3/uL 218 186 143*            IMAGING:    MRI 11/14/2024  FINDINGS:  CSF Spaces: Diffuse prominence of the ventricles and sulci is noted.  The basal cisterns are clear. Appearance is unchanged since the  recent MRI brain study.      Parenchyma: Compared to MRI 11/13/2024, interval increase in size of  restricted diffusion with associated T2/FLAIR  hyperintensity  involving the left cerebral hemispheric white matter primarily within  the white matter. Additional foci of restricted diffusion with  associated T2/FLAIR hyperintensity are noted within the bilateral  frontal lobes including the precentral gyri, left parietal lobe, left  temporal lobe, and left insula as well as the left mesial temporal  lobe. No striking hemorrhagic transformation or striking mass effect.      Few new small foci of restricted diffusion are seen, including within  the left parietal lobe gray-white matter junction (series 3 image 50  of 76) and a small focus (Series 3, Image 53) of restricted diffusion  located within the left parietal lobe gray matter (series 3, image 53  of 76) was faintly seen on the prior MRI.      Encephalomalacia/gliosis is again noted in the posterior right  occipital lobe. Chronic right thalamic infarct is again seen.  Additional patchy areas of T2/FLAIR hyperintensity are noted within  the deep, periventricular, and subcortical white matter bilaterally,  likely related to chronic small vessel ischemic changes. Small old  infarct in the right cerebellum is unchanged. There is no mass effect  or midline shift.      Loss of flow void located within distal right cervical ICA and  portions of the right intracranial ICA consistent with occlusion and  better evaluated on the prior CTA head study.      Paranasal Sinuses and Mastoids: Visualized paranasal sinuses and  mastoid air cells are unremarkable.      IMPRESSION:  1. Evolving infarcts located within the left cerebral hemisphere,  including in the watershed distribution, overall more pronounced in  appearance but similar in extent with no hemorrhagic transformation  or surrounding mass effect.      2. Additional evolving acute infarcts are seen located within the  bilateral cerebral hemispheres and are stable to slightly more  pronounced in appearance. Few new punctate acute infarcts located  within the left  cerebral hemisphere. There is no surrounding mass  effect or hemorrhagic transformation.    IMAGING:  MR brain wo IV contrast  Result Date: 11/13/2024 1:26 am     FINDINGS:   CSF Spaces: The ventricles, sulci and basal cisterns are within normal limits for patient's age.     Parenchyma: Watershed distribution areas of T2/FLAIR hyperintense signal with associated diffusion restriction throughout the left cerebral hemisphere most pronounced in the superior left frontal lobe but also involving the parietal and medial temporal lobes. Finding is consistent with acute/subacute watershed infarction.   Cortical foci of diffusion restriction in the left frontal lobe cortex along the interhemispheric aspect, in the left sylvian fissure and posterior left insula and in the left posterior temporal lobe. These findings are consistent with non watershed distribution   2.5 cm focus of encephalomalacia within the right occipital lobe consistent with previous infarction in the distribution of the right posterior cerebral artery. Focus of subacute/chronic lacunar infarction in the right thalamus measuring a proximally 5 mm x 15 mm in size. Focus of susceptibility artifact within the left basal ganglia favored to correspond to mineralization seen on prior CT. Nonspecific patchy T2/FLAIR hyperintense signal within the periventricular and subcortical white matter, likely sequela of chronic microangiopathy.   There is no mass effect or midline shift.        Paranasal Sinuses and Mastoids: Visualized paranasal sinuses and mastoid air cells are unremarkable.   Note is made of postsurgical changes from bilateral lens replacements.        Impression  1. Acute/subacute watershed ischemia throughout the left cerebral hemisphere   2. Subcentimeter cortical infarctions involving the left superior frontal lobe but also involving the left parietal and temporal lobes. No evidence of hemorrhagic transformation.   3. Nonspecific white matter changes  which can be seen in the setting of chronic microangiopathy.        Transthoracic Echo (TTE) Complete  11/12/2024    CONCLUSIONS:    1. Left ventricular ejection fraction is normal, by visual estimate at 65-70%.    2. Spectral Doppler shows an abnormal pattern of left ventricular diastolic filling.    3. Left ventricular cavity size is decreased.    4. No left ventricular thrombus visualized.    5. There is normal right ventricular global systolic function.    6. The left atrium is severely dilated.    7. Agitated saline contrast study was negative for intracardiac shunt.    8. Normal aortic root.    9. No prior echocardiogram available for comparison.      CT ANGIO AORTA AND BILATERAL ILIOFEMORAL RUN OFF INCLUDING WITHOUT  CONTRAST 11/12/2024  IMPRESSION:  1. Extensive vascular disease including occlusion of the infrarenal  abdominal aorta, bilateral common iliac arteries. Additionally, no  appreciated flow is noted in the left posterior tibial artery  distally. Please refer to the detailed description above.  2. Other nonspecific finding as detailed above.     CT head wo IV contrast; CT angio head and neck w and wo IV contrast  11/12/2024 5:34 am     FINDINGS: NON-CONTRAST HEAD CT:     BRAIN PARENCHYMA:  No evidence of acute intraparenchymal hemorrhage or parenchymal evidence of an acute large territory ischemic infarct. No mass-effect, midline shift or effacement of cerebral sulci. Gray-white matter distinction is preserved. Punctate calcifications within the left-greater-than-right basal ganglia. Lacunar within the posterior limb of the right internal capsule and left basal ganglia. Small amount of encephalomalacia and gliosis within the right occipital lobe.     VENTRICLES and EXTRA-AXIAL SPACES:  No acute extra-axial or intraventricular hemorrhage. Ventricles and sulci are age-concordant.     PARANASAL SINUSES/MASTOIDS:  No hemorrhage or air-fluid levels within the visualized paranasal sinuses. The mastoids  are well aerated.     CALVARIUM/ORBITS:  No skull fracture.  The orbits and globes are intact to the extent visualized.     EXTRACRANIAL SOFT TISSUES: No discernible abnormality.         CTA NECK:   There is calcified and noncalcified plaque along the aortic arch and origins of the great vessels. There is marked narrowing at the origin of the right common carotid artery and right subclavian artery. There is moderate narrowing secondary to noncalcified plaque at the origin of the left subclavian artery.     LEFT VERTEBRAL ARTERY:  No hemodynamically significant stenosis, occlusion, or dissection. The left vertebral artery is dominant.     LEFT COMMON/INTERNAL CAROTID ARTERY:  There is calcified plaque along the course of the common carotid artery with mild-to-moderate narrowing. There is coarse calcification at the carotid bifurcation and along the proximal cervical internal carotid artery with a proximally 65% stenosis by NASCET criteria. The cervical internal carotid artery is patent to the level of the skull base without additional stenosis.     RIGHT VERTEBRAL ARTERY: There is coarse atherosclerotic calcification at the origin of the right vertebral artery. No hemodynamically significant stenosis, occlusion, or dissection.     RIGHT COMMON/INTERNAL CAROTID ARTERY:  There is occlusion of the right common carotid artery just beyond the origin. There is occlusion of the right cervical internal carotid artery.   Limited images through the lung apices demonstrate emphysematous changes. Mild degenerative changes of the cervical spine without evidence of high-grade spinal canal stenosis. Soft tissues of the neck are unremarkable.            CTA HEAD:     ANTERIOR CIRCULATION: No aneurysm.    - Internal Carotid Arteries: There is occlusion of the right petrous and cavernous internal carotid artery. There is reconstitution within the supraclinoid segment. The left intracranial carotid artery is patent. There is  atherosclerotic calcification along the cavernous and supraclinoid ICA with mild narrowing.     - Middle Cerebral Arteries: No hemodynamically significant stenosis or occlusion.     - Anterior Cerebral Arteries: There is and abrupt cutoff of the distal left anterior cerebral artery (series 504, image 82). The right DAKOTA is without hemodynamically significant stenosis or occlusion.       POSTERIOR CIRCULATION: No aneurysm.     - Intracranial Vertebral Arteries:  No hemodynamically significant stenosis or occlusion.     - Basilar Artery:  No hemodynamically significant stenosis or occlusion.     - Posterior Cerebral Arteries:  No hemodynamically significant stenosis or occlusion.        CTA NECK:     Complete occlusion of the right common carotid artery and right cervical internal carotid artery.   Coarse atherosclerotic calcification at the left carotid bifurcation with approximately 65% stenosis by NASCET criteria.   The vertebral arteries are patent. The left vertebral artery is dominant. There is a coarse calcification at the origin of the right vertebral artery.   Calcified and noncalcified plaque along the aortic arch and origins of the great vessels with narrowing.     CTA head:   There is an abrupt cutoff of the distal left anterior cerebral artery.   Occlusion of the right petrous and cavernous carotid arteries with reconstitution in the supraclinoid segment.   The left intracranial carotid artery is patent with coarse atherosclerotic calcification and mild narrowing.   The posterior circulation is patent without significant stenosis.        CT HEAD:   No acute intracranial hemorrhage or mass effect.     ---     CT head wo IV contrast 11/11/2024 9:03 pm     FINDINGS:   CSF Spaces: Ventricles, cortical sulci and basal cisterns are prominent reflecting age related involutional changes and mild volume loss. Mildly prominent extra-axial CSF space within the bifrontal region may represent asymmetric volume loss.  There is no extraaxial fluid collection.     Parenchyma: There is no acute intraparenchymal hemorrhage or parenchymal evidence of acute large territorial ischemic infarction. Patchy white matter hypodensity likely represents microangiopathy. Lucency within the right basal ganglia likely represents a lacunar infarct. Small area of encephalomalacia and gliosis within the right occipital lobe. Of note multiple intracranial vessels are hyperdense, likely secondary to recent IR neuro angiogram. The grey-white differentiation is intact. There is no mass effect or midline shift.     Calvarium: The calvarium is unremarkable.     Paranasal sinuses and mastoids: Visualized paranasal sinuses and mastoids are clear.        No acute intracranial hemorrhage or mass effect.   Small focal area of encephalomalacia and gliosis within the right occipital lobe.   Mild white matter changes compatible with microangiopathy. Lacunar infarct within the right basal ganglia.                     Assessment/Plan   This patient currently has cardiac telemetry ordered; if you would like to modify or discontinue the telemetry order, click here to go to the orders activity to modify/discontinue the order.  Assessment & Plan  Cerebrovascular accident (CVA) due to occlusion of left middle cerebral artery (Multi)      Assessment:    Ms. Zulma Rao is an 80 YO RH white F with history of stroke in 7/2024, HTN, HLD, R CEA 2011, HFpEF, COPD, anxiety, partial hepatectomy for HCC 2019 transferred from [facility]  for acute left MCA infarct with NIH 21, s/p TNK. MT performed, but patient's left had already recanalized.    Admitted to NSU for continued monitoring on 11/11 with NIH of 4 with small stroke burden largely in L  MCA-DAKOTA watershed.  Noted to have deterioration in neurological examination (NIH 13) when SBP below 170-180 and started on vasopressor therapy.    Noted to again deteriorate in terms of R arm strength when off vasopressor  therapy with repeat MRI brain on 11/14 demonstrating increased in stroke burden in the L MCA-DAKOTA watershed. Pressor therapy restarted.    Patient's  clarified she has been on DAPT since 07/2024 and therefore we conclude that she is essentially failing maximal medical therapy .    NSGY consulted for L CEA given 65% stenosis and 100% occlusion of the R ICA on CTA head/neck. Tentatively planning for OR on 11/18/24.     Type: Ischemic stroke  Subtype/etiology: Artery to artery  Vessels involved: left MCA/DAKOTA watershed  Neurological manifestations:  NIHSS (worst at presentation): 21   Antiplatelet/antithrombotic plan for stroke prevention: Aspirin; clopidogrel (currently HELD prior to 11/18 L CEA)  VTE prophylaxis: SCDs, Heparin 5000 units Q8hr     Vascular Risk Factor modification goals:  Blood pressure goals: avoid hypotension SBP <100 that could worsen cerebral perfusion, Ischemic stroke post-thrombectomy   Lipid Goals: education on healthy diet and statin therapy to maintain or achieve goal LDL-cholesterol < 70mg  Glucose Goals: early treatment of hyperglycemia to goal glucose 140-180 mg/dl with long-term goal A1c < 7%   Smoking Cessation and Education  Assessment for Rehabilitation needs   Patient and family education on signs and symptoms of stroke, calling 911, healthy strategies for stroke prevention.       Plan:     # Acute Left MCA infarct s/p TNK (11/11 @ 1255) and MT (already recanalized- no Tici scoring)  : : LDL 92; A1c 5.8%  : : NIHSS 21 [facility] 5 this morning.     : : MRI brain w/o contrast with L cerebral hemisphere acute-subacute ischemia; subcentimeter cortical infarcts of L superior frontal, parietal, annd temporal lobes. 11/14: Stroke burden increased in size within the same distribution as prior comparison 11/13.  : : CTA with atherosclerotic calcification at the left carotid bifurcation with approximately 65% stenosis by NASCET criteria  : : TTE: Bubble study  negative. LVEF 65-70%. No thrombus. Severe LA dilatation.   : : cvEEG with no epileptiform activity.     Recommendations:    - Q1 neuro checks, remain NSU status for vasopressor support  - BP goal 180-200 until carotid revascularization  - ASA 81 mg daily  - Hold Plavix for upcoming carotid revascularization  - DVT Ppx: continue SCDs, ctn Q8hr subcutaneous heparin   - Continue atorvastatin 80mg  - NSGY consulted for carotid revascularization, currently planned for 11/18/2024 - will need EEG leads placed prior to surgery. Cardiology - RCRI 3, elevated risk but not prohibitive. Will need to hold discussion prior to proceeding.     Kenneth Marie MD  PGY-4 Neurology  Stroke [id number]

## 2024-11-17 NOTE — PROGRESS NOTES
"Zulma Rao is a 79 y.o. female on day 6 of admission presenting with Cerebrovascular accident (CVA) due to occlusion of left middle cerebral artery (Multi).    Subjective   No events overnight    Objective     Physical Exam  Awake, Ox3  RUE prox 0 dist 4+  RLE prox 3 dist 3   LUE 5/5  LLE 5/5  2L NC  SILT    Last Recorded Vitals  Blood pressure 155/68, pulse 77, temperature 36.2 °C (97.2 °F), temperature source Temporal, resp. rate 12, height 1.626 m (5' 4\"), weight 50.9 kg (112 lb 3.4 oz), SpO2 99%.  Intake/Output last 3 Shifts:  I/O last 3 completed shifts:  In: 748.5 (14.7 mL/kg) [P.O.:600; I.V.:148.5 (2.9 mL/kg)]  Out: 2415 (47.4 mL/kg) [Urine:2415 (1.3 mL/kg/hr)]  Weight: 50.9 kg     Relevant Results  Lab Results   Component Value Date    WBC 9.3 11/17/2024    HGB 10.6 (L) 11/17/2024    HCT 31.1 (L) 11/17/2024    MCV 87 11/17/2024     11/17/2024       Assessment/Plan   Principal Problem:    Cerebrovascular accident (CVA) due to occlusion of left middle cerebral artery (Multi)    Zulma Rao is an 79 year old with h/o HFpEF, NSTEMI, T2DM, HTN, HLD, anxiety, hypothyroidism, COPD, previous R sided stroke (7/2024), b/l cataracts, bilateral carotid stenosis s/p R CEA (2011), HCC s/p partial hepatectomy (2019), p/w CVA 2/2 L M1 occlusion, s/p TNK, s/p MT w TICI 3, MRI L watershed infarcts, small infarcts involving left frontal, parietal, and temporal lobes, CTA 74% L carotid artery occlusion proximal to bifurcation, moderate R V2 narrowing, R carotid occlusion at origin, CTA stable    MRI brain stable evolving infarcts, interval increase watershed infarcts    Stroke primary  OR Mon 11/18 for L CEA, will have discussion with patient and family regarding high risk and benefits prior to proceeding  Patient will need EEG leads placed at midnight prior to OR  Please ensure patient has updated type and screen, coag, EKG  Hold Plavix, continue ASA  SBP goal 180-200 Per stroke  Cards recs- RCRI 3, elevated " risk, but not prohibitive    Winston Nunez MD

## 2024-11-18 ENCOUNTER — HOSPITAL ENCOUNTER (INPATIENT)
Dept: NEUROLOGY | Facility: HOSPITAL | Age: 79
Discharge: HOME | DRG: 038 | End: 2024-11-18
Payer: MEDICARE

## 2024-11-18 ENCOUNTER — ANESTHESIA (OUTPATIENT)
Dept: OPERATING ROOM | Facility: HOSPITAL | Age: 79
End: 2024-11-18
Payer: MEDICARE

## 2024-11-18 LAB
ACT BLD: 213 SEC (ref 83–199)
ACT BLD: 252 SEC (ref 83–199)
ALBUMIN SERPL BCP-MCNC: 3.8 G/DL (ref 3.4–5)
ANION GAP SERPL CALC-SCNC: 15 MMOL/L (ref 10–20)
BASOPHILS # BLD AUTO: 0.09 X10*3/UL (ref 0–0.1)
BASOPHILS NFR BLD AUTO: 1 %
BUN SERPL-MCNC: 6 MG/DL (ref 6–23)
CA-I BLD-SCNC: 1.2 MMOL/L (ref 1.1–1.33)
CALCIUM SERPL-MCNC: 9.4 MG/DL (ref 8.6–10.6)
CHLORIDE SERPL-SCNC: 100 MMOL/L (ref 98–107)
CO2 SERPL-SCNC: 30 MMOL/L (ref 21–32)
CREAT SERPL-MCNC: 0.44 MG/DL (ref 0.5–1.05)
EGFRCR SERPLBLD CKD-EPI 2021: >90 ML/MIN/1.73M*2
EOSINOPHIL # BLD AUTO: 0.58 X10*3/UL (ref 0–0.4)
EOSINOPHIL NFR BLD AUTO: 6.3 %
ERYTHROCYTE [DISTWIDTH] IN BLOOD BY AUTOMATED COUNT: 13.8 % (ref 11.5–14.5)
GLUCOSE BLD MANUAL STRIP-MCNC: 136 MG/DL (ref 74–99)
GLUCOSE BLD MANUAL STRIP-MCNC: 139 MG/DL (ref 74–99)
GLUCOSE SERPL-MCNC: 128 MG/DL (ref 74–99)
HCT VFR BLD AUTO: 33 % (ref 36–46)
HGB BLD-MCNC: 11.4 G/DL (ref 12–16)
HOLD SPECIMEN: NORMAL
IMM GRANULOCYTES # BLD AUTO: 0.07 X10*3/UL (ref 0–0.5)
IMM GRANULOCYTES NFR BLD AUTO: 0.8 % (ref 0–0.9)
LYMPHOCYTES # BLD AUTO: 1.36 X10*3/UL (ref 0.8–3)
LYMPHOCYTES NFR BLD AUTO: 14.7 %
MAGNESIUM SERPL-MCNC: 2.01 MG/DL (ref 1.6–2.4)
MCH RBC QN AUTO: 30.2 PG (ref 26–34)
MCHC RBC AUTO-ENTMCNC: 34.5 G/DL (ref 32–36)
MCV RBC AUTO: 88 FL (ref 80–100)
MONOCYTES # BLD AUTO: 1.04 X10*3/UL (ref 0.05–0.8)
MONOCYTES NFR BLD AUTO: 11.2 %
NEUTROPHILS # BLD AUTO: 6.12 X10*3/UL (ref 1.6–5.5)
NEUTROPHILS NFR BLD AUTO: 66 %
NRBC BLD-RTO: 0 /100 WBCS (ref 0–0)
PHOSPHATE SERPL-MCNC: 3.7 MG/DL (ref 2.5–4.9)
PLATELET # BLD AUTO: 221 X10*3/UL (ref 150–450)
POTASSIUM SERPL-SCNC: 3.7 MMOL/L (ref 3.5–5.3)
RBC # BLD AUTO: 3.77 X10*6/UL (ref 4–5.2)
SODIUM SERPL-SCNC: 141 MMOL/L (ref 136–145)
WBC # BLD AUTO: 9.3 X10*3/UL (ref 4.4–11.3)

## 2024-11-18 PROCEDURE — 3600000009 HC OR TIME - EACH INCREMENTAL 1 MINUTE - PROCEDURE LEVEL FOUR: Performed by: NEUROLOGICAL SURGERY

## 2024-11-18 PROCEDURE — 2500000004 HC RX 250 GENERAL PHARMACY W/ HCPCS (ALT 636 FOR OP/ED)

## 2024-11-18 PROCEDURE — 2780000003 HC OR 278 NO HCPCS: Performed by: NEUROLOGICAL SURGERY

## 2024-11-18 PROCEDURE — 3700000002 HC GENERAL ANESTHESIA TIME - EACH INCREMENTAL 1 MINUTE: Performed by: NEUROLOGICAL SURGERY

## 2024-11-18 PROCEDURE — 88311 DECALCIFY TISSUE: CPT | Performed by: PATHOLOGY

## 2024-11-18 PROCEDURE — 2500000001 HC RX 250 WO HCPCS SELF ADMINISTERED DRUGS (ALT 637 FOR MEDICARE OP)

## 2024-11-18 PROCEDURE — 99233 SBSQ HOSP IP/OBS HIGH 50: CPT | Performed by: PSYCHIATRY & NEUROLOGY

## 2024-11-18 PROCEDURE — 95925 SOMATOSENSORY TESTING: CPT

## 2024-11-18 PROCEDURE — 3700000001 HC GENERAL ANESTHESIA TIME - INITIAL BASE CHARGE: Performed by: NEUROLOGICAL SURGERY

## 2024-11-18 PROCEDURE — 36620 INSERTION CATHETER ARTERY: CPT | Performed by: ANESTHESIOLOGY

## 2024-11-18 PROCEDURE — 82947 ASSAY GLUCOSE BLOOD QUANT: CPT

## 2024-11-18 PROCEDURE — 2500000005 HC RX 250 GENERAL PHARMACY W/O HCPCS: Performed by: ANESTHESIOLOGIST ASSISTANT

## 2024-11-18 PROCEDURE — 3600000004 HC OR TIME - INITIAL BASE CHARGE - PROCEDURE LEVEL FOUR: Performed by: NEUROLOGICAL SURGERY

## 2024-11-18 PROCEDURE — C9248 INJ, CLEVIDIPINE BUTYRATE: HCPCS | Performed by: ANESTHESIOLOGIST ASSISTANT

## 2024-11-18 PROCEDURE — 37799 UNLISTED PX VASCULAR SURGERY: CPT | Performed by: STUDENT IN AN ORGANIZED HEALTH CARE EDUCATION/TRAINING PROGRAM

## 2024-11-18 PROCEDURE — A35301 PR THROMBOENDARTECTMY NECK,NECK INCIS: Performed by: ANESTHESIOLOGIST ASSISTANT

## 2024-11-18 PROCEDURE — 83735 ASSAY OF MAGNESIUM: CPT

## 2024-11-18 PROCEDURE — A35301 PR THROMBOENDARTECTMY NECK,NECK INCIS: Performed by: ANESTHESIOLOGY

## 2024-11-18 PROCEDURE — 99100 ANES PT EXTEME AGE<1 YR&>70: CPT | Performed by: ANESTHESIOLOGY

## 2024-11-18 PROCEDURE — 35301 RECHANNELING OF ARTERY: CPT | Performed by: NEUROLOGICAL SURGERY

## 2024-11-18 PROCEDURE — 2500000004 HC RX 250 GENERAL PHARMACY W/ HCPCS (ALT 636 FOR OP/ED): Performed by: INTERNAL MEDICINE

## 2024-11-18 PROCEDURE — 88304 TISSUE EXAM BY PATHOLOGIST: CPT | Performed by: PATHOLOGY

## 2024-11-18 PROCEDURE — 85347 COAGULATION TIME ACTIVATED: CPT

## 2024-11-18 PROCEDURE — 82330 ASSAY OF CALCIUM: CPT | Performed by: STUDENT IN AN ORGANIZED HEALTH CARE EDUCATION/TRAINING PROGRAM

## 2024-11-18 PROCEDURE — 4A10X4G MONITORING OF CENTRAL NERVOUS ELECTRICAL ACTIVITY, INTRAOPERATIVE, EXTERNAL APPROACH: ICD-10-PCS | Performed by: NEUROLOGICAL SURGERY

## 2024-11-18 PROCEDURE — 2500000004 HC RX 250 GENERAL PHARMACY W/ HCPCS (ALT 636 FOR OP/ED): Performed by: ANESTHESIOLOGIST ASSISTANT

## 2024-11-18 PROCEDURE — 2500000004 HC RX 250 GENERAL PHARMACY W/ HCPCS (ALT 636 FOR OP/ED): Performed by: NEUROLOGICAL SURGERY

## 2024-11-18 PROCEDURE — G0453 CONT INTRAOP NEURO MONITOR: HCPCS | Performed by: PSYCHIATRY & NEUROLOGY

## 2024-11-18 PROCEDURE — 95925 SOMATOSENSORY TESTING: CPT | Performed by: PSYCHIATRY & NEUROLOGY

## 2024-11-18 PROCEDURE — 95955 EEG DURING SURGERY: CPT | Performed by: PSYCHIATRY & NEUROLOGY

## 2024-11-18 PROCEDURE — 0752T DGTZ GLS MCRSCP SLD LVL III: CPT | Mod: TC,SUR | Performed by: NEUROLOGICAL SURGERY

## 2024-11-18 PROCEDURE — 2500000001 HC RX 250 WO HCPCS SELF ADMINISTERED DRUGS (ALT 637 FOR MEDICARE OP): Performed by: STUDENT IN AN ORGANIZED HEALTH CARE EDUCATION/TRAINING PROGRAM

## 2024-11-18 PROCEDURE — 2020000001 HC ICU ROOM DAILY

## 2024-11-18 PROCEDURE — 85025 COMPLETE CBC W/AUTO DIFF WBC: CPT

## 2024-11-18 PROCEDURE — 03CJ0ZZ EXTIRPATION OF MATTER FROM LEFT COMMON CAROTID ARTERY, OPEN APPROACH: ICD-10-PCS | Performed by: NEUROLOGICAL SURGERY

## 2024-11-18 PROCEDURE — 2720000007 HC OR 272 NO HCPCS: Performed by: NEUROLOGICAL SURGERY

## 2024-11-18 PROCEDURE — 2500000005 HC RX 250 GENERAL PHARMACY W/O HCPCS: Performed by: NEUROLOGICAL SURGERY

## 2024-11-18 PROCEDURE — 2500000005 HC RX 250 GENERAL PHARMACY W/O HCPCS: Performed by: STUDENT IN AN ORGANIZED HEALTH CARE EDUCATION/TRAINING PROGRAM

## 2024-11-18 PROCEDURE — 80069 RENAL FUNCTION PANEL: CPT

## 2024-11-18 DEVICE — IMPLANTABLE DEVICE: Type: IMPLANTABLE DEVICE | Site: THROAT | Status: NON-FUNCTIONAL

## 2024-11-18 RX ORDER — LIDOCAINE HYDROCHLORIDE AND EPINEPHRINE 5; 5 MG/ML; UG/ML
INJECTION, SOLUTION INFILTRATION; PERINEURAL AS NEEDED
Status: DISCONTINUED | OUTPATIENT
Start: 2024-11-18 | End: 2024-11-18 | Stop reason: HOSPADM

## 2024-11-18 RX ORDER — CEFAZOLIN 1 G/1
INJECTION, POWDER, FOR SOLUTION INTRAVENOUS AS NEEDED
Status: DISCONTINUED | OUTPATIENT
Start: 2024-11-18 | End: 2024-11-18

## 2024-11-18 RX ORDER — LABETALOL HYDROCHLORIDE 5 MG/ML
10 INJECTION, SOLUTION INTRAVENOUS EVERY 10 MIN PRN
Status: DISPENSED | OUTPATIENT
Start: 2024-11-18 | End: 2024-11-20

## 2024-11-18 RX ORDER — FENTANYL CITRATE 50 UG/ML
INJECTION, SOLUTION INTRAMUSCULAR; INTRAVENOUS AS NEEDED
Status: DISCONTINUED | OUTPATIENT
Start: 2024-11-18 | End: 2024-11-18

## 2024-11-18 RX ORDER — HYDRALAZINE HYDROCHLORIDE 20 MG/ML
10 INJECTION INTRAMUSCULAR; INTRAVENOUS
Status: DISPENSED | OUTPATIENT
Start: 2024-11-18 | End: 2024-11-20

## 2024-11-18 RX ORDER — NOREPINEPHRINE BITARTRATE 0.06 MG/ML
0-.5 INJECTION, SOLUTION INTRAVENOUS CONTINUOUS
Status: DISCONTINUED | OUTPATIENT
Start: 2024-11-18 | End: 2024-11-18

## 2024-11-18 RX ORDER — PROPOFOL 10 MG/ML
INJECTION, EMULSION INTRAVENOUS AS NEEDED
Status: DISCONTINUED | OUTPATIENT
Start: 2024-11-18 | End: 2024-11-18

## 2024-11-18 RX ORDER — LIDOCAINE HYDROCHLORIDE 20 MG/ML
INJECTION, SOLUTION INFILTRATION; PERINEURAL AS NEEDED
Status: DISCONTINUED | OUTPATIENT
Start: 2024-11-18 | End: 2024-11-18

## 2024-11-18 RX ORDER — NICARDIPINE HYDROCHLORIDE 0.2 MG/ML
2.5-15 INJECTION INTRAVENOUS CONTINUOUS
Status: DISCONTINUED | OUTPATIENT
Start: 2024-11-18 | End: 2024-11-19

## 2024-11-18 RX ORDER — ROCURONIUM BROMIDE 10 MG/ML
INJECTION, SOLUTION INTRAVENOUS AS NEEDED
Status: DISCONTINUED | OUTPATIENT
Start: 2024-11-18 | End: 2024-11-18

## 2024-11-18 RX ORDER — NOREPINEPHRINE BITARTRATE 0.03 MG/ML
INJECTION, SOLUTION INTRAVENOUS CONTINUOUS PRN
Status: DISCONTINUED | OUTPATIENT
Start: 2024-11-18 | End: 2024-11-18

## 2024-11-18 RX ORDER — DEXAMETHASONE SODIUM PHOSPHATE 10 MG/ML
INJECTION INTRAMUSCULAR; INTRAVENOUS AS NEEDED
Status: DISCONTINUED | OUTPATIENT
Start: 2024-11-18 | End: 2024-11-18

## 2024-11-18 RX ORDER — PHENYLEPHRINE 10 MG/250 ML(40 MCG/ML)IN 0.9 % SOD.CHLORIDE INTRAVENOUS
CONTINUOUS PRN
Status: DISCONTINUED | OUTPATIENT
Start: 2024-11-18 | End: 2024-11-18

## 2024-11-18 RX ADMIN — MELATONIN 3 MG: 3 TAB ORAL at 20:05

## 2024-11-18 RX ADMIN — PANTOPRAZOLE SODIUM 40 MG: 40 INJECTION, POWDER, FOR SOLUTION INTRAVENOUS at 06:22

## 2024-11-18 RX ADMIN — LABETALOL HYDROCHLORIDE 10 MG: 5 INJECTION, SOLUTION INTRAVENOUS at 21:54

## 2024-11-18 RX ADMIN — ACETAMINOPHEN 650 MG: 325 TABLET ORAL at 22:22

## 2024-11-18 RX ADMIN — NICARDIPINE HYDROCHLORIDE 2.5 MG/HR: 0.2 INJECTION, SOLUTION INTRAVENOUS at 13:29

## 2024-11-18 RX ADMIN — BUSPIRONE HYDROCHLORIDE 10 MG: 10 TABLET ORAL at 16:23

## 2024-11-18 RX ADMIN — LEVOTHYROXINE SODIUM 25 MCG: 25 TABLET ORAL at 06:22

## 2024-11-18 RX ADMIN — ATORVASTATIN CALCIUM 80 MG: 80 TABLET, FILM COATED ORAL at 20:05

## 2024-11-18 RX ADMIN — HYDRALAZINE HYDROCHLORIDE 10 MG: 20 INJECTION INTRAMUSCULAR; INTRAVENOUS at 17:45

## 2024-11-18 RX ADMIN — BUSPIRONE HYDROCHLORIDE 10 MG: 10 TABLET ORAL at 20:07

## 2024-11-18 RX ADMIN — LABETALOL HYDROCHLORIDE 10 MG: 5 INJECTION, SOLUTION INTRAVENOUS at 16:33

## 2024-11-18 RX ADMIN — SENNOSIDES 17.2 MG: 8.6 TABLET, FILM COATED ORAL at 20:07

## 2024-11-18 RX ADMIN — ASPIRIN 81 MG CHEWABLE TABLET 81 MG: 81 TABLET CHEWABLE at 20:07

## 2024-11-18 RX ADMIN — SODIUM CHLORIDE 500 ML: 9 INJECTION, SOLUTION INTRAVENOUS at 21:45

## 2024-11-18 ASSESSMENT — PAIN SCALES - GENERAL
PAINLEVEL_OUTOF10: 0 - NO PAIN
PAIN_LEVEL: 1
PAINLEVEL_OUTOF10: 2
PAINLEVEL_OUTOF10: 5 - MODERATE PAIN
PAINLEVEL_OUTOF10: 0 - NO PAIN

## 2024-11-18 ASSESSMENT — PAIN - FUNCTIONAL ASSESSMENT
PAIN_FUNCTIONAL_ASSESSMENT: 0-10

## 2024-11-18 ASSESSMENT — PAIN DESCRIPTION - ORIENTATION: ORIENTATION: RIGHT;LOWER

## 2024-11-18 ASSESSMENT — PAIN DESCRIPTION - LOCATION: LOCATION: LEG

## 2024-11-18 NOTE — ANESTHESIA POSTPROCEDURE EVALUATION
Patient: Zulma Rao    Procedure Summary       Date: 11/18/24 Room / Location: [facility] OR 10 / [facility] OR    Anesthesia Start: 0746 Anesthesia Stop: 1233    Procedure: LEFT Endarterectomy Carotid Artery (Left: Throat) Diagnosis:       Cerebrovascular accident (CVA) due to occlusion of left middle cerebral artery (Multi)      (Cerebrovascular accident (CVA) due to occlusion of left middle cerebral artery (Multi) [I63.512])    Surgeons: Gil Dos Santos MD Responsible Provider: Mary Manrique MD    Anesthesia Type: general ASA Status: 4            Anesthesia Type: general    Vitals Value Taken Time   /73 11/18/24 1230   Temp 36.5 11/18/24 1235   Pulse 99 11/18/24 1234   Resp 15 11/18/24 1234   SpO2 100 % 11/18/24 1234   Vitals shown include unfiled device data.    Anesthesia Post Evaluation    Patient location during evaluation: PACU  Patient participation: complete - patient cannot participate  Level of consciousness: awake and alert  Pain score: 1  Pain management: adequate  Multimodal analgesia pain management approach  Airway patency: fixed obstruction  Two or more strategies used to mitigate risk of obstructive sleep apnea  Cardiovascular status: acceptable  Respiratory status: acceptable  Hydration status: acceptable  Postoperative Nausea and Vomiting: none      No notable events documented.

## 2024-11-18 NOTE — PROGRESS NOTES
"Zulma Rao is a 79 y.o. female on day 7 of admission presenting with Cerebrovascular accident (CVA) due to occlusion of left middle cerebral artery (Multi).    Subjective   No events overnight    Objective     Physical Exam  Awake, Ox3  RUE prox 0 dist 4+  RLE prox 3 dist 3   LUE 5/5  LLE 5/5  2L NC  SILT    Last Recorded Vitals  Blood pressure (!) 187/95, pulse 82, temperature 35.9 °C (96.6 °F), temperature source Temporal, resp. rate 18, height 1.626 m (5' 4\"), weight 50.9 kg (112 lb 3.4 oz), SpO2 99%.  Intake/Output last 3 Shifts:  I/O last 3 completed shifts:  In: 1502.2 (29.5 mL/kg) [P.O.:175; I.V.:164.7 (3.2 mL/kg); IV Piggyback:1162.5]  Out: 3180 (62.5 mL/kg) [Urine:3180 (1.7 mL/kg/hr)]  Weight: 50.9 kg     Relevant Results  Lab Results   Component Value Date    WBC 9.3 11/18/2024    HGB 11.4 (L) 11/18/2024    HCT 33.0 (L) 11/18/2024    MCV 88 11/18/2024     11/18/2024       Assessment/Plan   Principal Problem:    Cerebrovascular accident (CVA) due to occlusion of left middle cerebral artery (Multi)    Zulma Rao is an 79 year old with h/o HFpEF, NSTEMI, T2DM, HTN, HLD, anxiety, hypothyroidism, COPD, previous R sided stroke (7/2024), b/l cataracts, bilateral carotid stenosis s/p R CEA (2011), HCC s/p partial hepatectomy (2019), p/w CVA 2/2 L M1 occlusion, s/p TNK, s/p MT w TICI 3, MRI L watershed infarcts, small infarcts involving left frontal, parietal, and temporal lobes, CTA 74% L carotid artery occlusion proximal to bifurcation, moderate R V2 narrowing, R carotid occlusion at origin, CTA stable    MRI brain stable evolving infarcts, interval increase watershed infarcts    On 11/17 I had discussion with patient and her daugther as well about the elevated risk of stroke during CEA due to occluded contralateral carotid. Explained extensively the risk of surgery, benefit and alternatives such as cont medical management. All questions answered and patient elected to proceed with " surgery.    Stroke primary  OR Mon 11/18 for L CEA  Patient to remain in ICU post op  Hold Plavix, continue ASA  SBP goal 180-200 Per stroke  Cards recs- RCRI 3, elevated risk, but not prohibitive    Dave Soriano MD

## 2024-11-18 NOTE — PROGRESS NOTES
[facility]  NEUROSCIENCE INTENSIVE CARE UNIT  RECOVERY NOTE       Patient Name: Zulma Rao     MRN: [id number]     Admit Date: 2024     : 1945 AGE: 79 y.o. GENDER: female    Dx: Cerebrovascular accident (CVA) due to occlusion of left middle cerebral artery (Multi)  PROCEDURE: Left Carotid Artery Endarterectomy   Subjective    79 year-old female with past medical history of HFpEF, NSTEMI, type 2 diabetes mellitus, hypertension, hyperlipidemia, anxiety, hypothyroidism, COPD, previous right sided stroke (2024), bilateral cataracts, and bilateral carotid stenosis s/p right CEA () who presented with stroke-like symptoms, found to have left M1 occlusion s/p TNK and MT with TICI 3. MRI brain demonstrated left watershed infarcts, small infarcts involving left frontal, parietal, and temporal lobes. CTA demonstrated 74% left carotid artery occlusion proximal to bifurcation, moderate right V2 narrowing, and right carotid occlusion at origin.    Arrival to NSU: 1215  Report received from anesthesia and neurosurgery.    OR Events:  Easy airway per anesthesia  30mL EBL  No blood products given during case    See anesthesia record for medications given during case.     Objective    VITALS:  Heart Rate:  []   Temp:  [35.9 °C (96.6 °F)-36.9 °C (98.4 °F)]   Resp:  [14-25]   BP: (136-199)/()   SpO2:  [90 %-100 %]     EXAM:   NEURO:  - Drowsy, minimal eye opening to voice, SANTOS spontaneously/AG when stimulated, follows commands, pupils 3mm/reactive bilaterally  CV:  - RRR on telemetry, NSR-ST  - Left radial arterial line in place  RESP:  - Regular, unlabored  - Oxygen: SFM  :  - Indwelling catheter in place  GI:  - Abdomen NT/ND, soft  SKIN:  - Left surgical incision clean/dry/intact  - REESE drain in place post-operative to full suction    MEDICATIONS  Scheduled: PRN: Continuous:   aspirin, 81 mg, oral, Daily  atorvastatin, 80 mg, oral, Nightly  azithromycin, 250 mg, oral, Once per  day on Monday Wednesday Friday  busPIRone, 10 mg, oral, TID  [Held by provider] clopidogrel, 75 mg, oral, Daily  pantoprazole, 40 mg, oral, Daily before breakfast   Or  esomeprazole, 40 mg, nasoduodenal tube, Daily before breakfast   Or  pantoprazole, 40 mg, intravenous, Daily before breakfast  tiotropium, 2 puff, inhalation, Daily   And  fluticasone furoate-vilanteroL, 1 puff, inhalation, Daily  [Held by provider] heparin (porcine), 5,000 Units, subcutaneous, q8h  insulin lispro, 0-5 Units, subcutaneous, TID AC  levothyroxine, 25 mcg, oral, Daily  lidocaine, 1 patch, transdermal, Daily  melatonin, 3 mg, oral, Nightly  [Held by provider] midodrine, 5 mg, oral, TID  polyethylene glycol, 17 g, oral, Daily  sennosides, 2 tablet, oral, BID     PRN medications: acetaminophen, alteplase, calcium gluconate, calcium gluconate, dextrose, dextrose, glucagon, glucagon, hydrALAZINE, HYDROmorphone, ipratropium-albuteroL, labetaloL, magnesium sulfate, magnesium sulfate, ondansetron ODT **OR** ondansetron, oxygen, potassium chloride CR **OR** potassium chloride, potassium chloride CR **OR** potassium chloride, potassium chloride niCARdipine, 2.5-15 mg/hr, Last Rate: 2.5 mg/hr (11/18/24 1329)         LABS  Results from last 72 hours   Lab Units 11/18/24 0226 11/17/24  0215   GLUCOSE mg/dL 128* 124*   SODIUM mmol/L 141 142   POTASSIUM mmol/L 3.7 3.9   CHLORIDE mmol/L 100 103   CO2 mmol/L 30 29   ANION GAP mmol/L 15 14   BUN mg/dL 6 7   CREATININE mg/dL 0.44* 0.41*   EGFR mL/min/1.73m*2 >90 >90   CALCIUM mg/dL 9.4 8.6   PHOSPHORUS mg/dL 3.7 3.6   ALBUMIN g/dL 3.8 3.5   MAGNESIUM mg/dL 2.01 2.00   POCT CALCIUM IONIZED (MMOL/L) IN BLOOD mmol/L 1.20 1.15      Results from last 72 hours   Lab Units 11/18/24 0226 11/17/24  0215   WBC AUTO x10*3/uL 9.3 9.3   NRBC AUTO /100 WBCs 0.0 0.0   RBC AUTO x10*6/uL 3.77* 3.58*   HEMOGLOBIN g/dL 11.4* 10.6*   HEMATOCRIT % 33.0* 31.1*   MCV fL 88 87   MCH pg 30.2 29.6   MCHC g/dL 34.5 34.1   RDW %  13.8 13.6   PLATELETS AUTO x10*3/uL 221 218      Results from last 72 hours   Lab Units 11/18/24  0226 11/17/24  0215   WBC AUTO x10*3/uL 9.3 9.3   NRBC AUTO /100 WBCs 0.0 0.0   RBC AUTO x10*6/uL 3.77* 3.58*   HEMOGLOBIN g/dL 11.4* 10.6*   HEMATOCRIT % 33.0* 31.1*   MCV fL 88 87   MCH pg 30.2 29.6   MCHC g/dL 34.5 34.1   RDW % 13.8 13.6   PLATELETS AUTO x10*3/uL 221 218   NEUTROS PCT AUTO % 66.0 62.3   IG PCT AUTO % 0.8 0.4   LYMPHS PCT AUTO % 14.7 16.6   MONOS PCT AUTO % 11.2 12.8   EOS PCT AUTO % 6.3 7.0   BASOS PCT AUTO % 1.0 0.9   NEUTROS ABS x10*3/uL 6.12* 5.79*   IG AUTO x10*3/uL 0.07 0.04   LYMPHS ABS AUTO x10*3/uL 1.36 1.54   MONOS ABS AUTO x10*3/uL 1.04* 1.19*   EOS ABS AUTO x10*3/uL 0.58* 0.65*   BASOS ABS AUTO x10*3/uL 0.09 0.08      Results from last 72 hours   Lab Units 11/17/24  0901   PROTIME seconds 12.6   INR  1.1   APTT seconds 27      Results from last 72 hours   Lab Units 11/18/24 0226 11/17/24  0215   ALBUMIN g/dL 3.8 3.5       Assessment/Plan   #Left carotid artery endarterectomy  Plan:  - NSU Recovery - Post-op vitals and neuro checks: Q15min x4, then Q30 min x3, then Q1H  - Neuro Checks: Q1H  - BP goal: -160  - PRN: Labetalol, Hydralazine, and Nicardipine  - Nausea: ondansetron PRN  - Pain: scheduled lidocaine patch, acetaminophen PRN, hydromorphone PRN  - Aspirin 81mg daily, Clopidogrel re-start in 1 week  - Drain management per neurosurgery  - Continuous pulse oximetry - O2 PRN to maintain SpO2 > 94%, wean as tolerated  - Incentive spirometry Q2H while awake  - Monitor and replace electrolytes per ICU protocol  - No need for routine post-operative imaging, obtain CTH with onset of headache/exam change    ACCESS:  - PIVs  - Right PICC line  - Left radial arterial line    PPX:  - DVT Ppx: SCDs  - GI Ppx: PPI      RESTRAINTS:  Not indicated/Patient does not meet criteria for restraints    Edwin Scott, APRN-CNP  Neuroscience Intensive Care    Total critical care time of 60 minutes,  with > 50% of time spent in direct contact with patient/family for education, counseling and coordination of care.

## 2024-11-18 NOTE — BRIEF OP NOTE
Date: 2024 - 2024  OR Location: [facility] OR    Name: Zulma Rao, : 1945, Age: 79 y.o., MRN: [id number], Sex: female    Diagnosis  Pre-op Diagnosis      * Cerebrovascular accident (CVA) due to occlusion of left middle cerebral artery (Multi) [I63.512] Post-op Diagnosis     * Cerebrovascular accident (CVA) due to occlusion of left middle cerebral artery (Multi) [I63.512]     Procedures  LEFT Endarterectomy Carotid Artery  67494 - [place] TEAEC W/PATCH GRF CAROTID VERTB SUBCLAV NECK INC      Surgeons      * Gil Dos Santos - Primary    Resident/Fellow/Other Assistant:  Surgeons and Role:     * Aleida Cornelius MD - Resident - Assisting     * Clement Watts MD - Fellow    Staff:   Circulator: Deepti Davis Person: Trudi Gould Circulator: Mary Ann Gould Circulator: Reba    Anesthesia Staff: Anesthesiologist: Mary Manrique MD  C-AA: DELBERT Luis    Procedure Summary  Anesthesia: Anesthesia type not filed in the log.  ASA: IV  Estimated Blood Loss: 30mL  Intra-op Medications:   Administrations occurring from 0655 to 1205 on 24:   Medication Name Total Dose   lidocaine-epinephrine (Xylocaine W/EPI) 0.5 %-1:200,000 injection 10 mL   polymyxin B 500,000 Units in sodium chloride 0.9 % 1,000 mL irrigation 1,000 mL   heparin (porcine) 25,000 Units in sodium chloride 0.9 % 250 mL irrigation 25,000 Units   acetaminophen (Tylenol) tablet 650 mg Cannot be calculated   alteplase (Cathflo Activase) injection 2 mg Cannot be calculated   aspirin chewable tablet 81 mg Cannot be calculated   atorvastatin (Lipitor) tablet 80 mg Cannot be calculated   azithromycin (Zithromax) tablet 250 mg Cannot be calculated   busPIRone (Buspar) tablet 10 mg Cannot be calculated   calcium gluconate 1 g in sodium chloride (iso) IV 50 mL Cannot be calculated   calcium gluconate 2 g in sodium chloride (iso)  mL Cannot be calculated   ceFAZolin (Ancef) vial 1 g 2 g   clevidipine (Cleviprex)  25 mg/50 mL IV 1.85 mg   clopidogrel (Plavix) tablet 75 mg Cannot be calculated   dexAMETHasone (Decadron) 10 mg/mL 6 mg   dextrose 50 % injection 12.5 g Cannot be calculated   dextrose 50 % injection 25 g Cannot be calculated   fentaNYL (Sublimaze) injection 50 mcg/mL 100 mcg   glucagon (Glucagen) injection 1 mg Cannot be calculated   glucagon (Glucagen) injection 1 mg Cannot be calculated   heparin (porcine) injection 5,000 Units 5,000 Units   HYDROmorphone (Dilaudid) injection 0.2 mg Cannot be calculated   insulin lispro injection 0-5 Units Cannot be calculated   ipratropium-albuteroL (Duo-Neb) 0.5-2.5 mg/3 mL nebulizer solution 3 mL Cannot be calculated   levothyroxine (Synthroid, Levoxyl) tablet 25 mcg Cannot be calculated   lidocaine (Xylocaine) injection 2 % 80 mg   lidocaine 4 % patch 1 patch Cannot be calculated   magnesium sulfate 2 g in sterile water for injection 50 mL Cannot be calculated   magnesium sulfate 4 g in sterile water for injection 100 mL Cannot be calculated   melatonin tablet 3 mg Cannot be calculated   midodrine (Proamatine) tablet 5 mg Cannot be calculated   norepinephrine (Levophed) 8 mg / 250 mL NS  (premix) 1.03 mg   norepinephrine (Levophed) 16 mcg/mL IV bolus 24 mcg   phenylephrine (Miguel-Synephrine) 10 mg in sodium chloride 0.9% 250 mL (0.04 mg/mL) infusion (premix) 0.74 mg   polyethylene glycol (Glycolax, Miralax) packet 17 g Cannot be calculated   potassium chloride 20 mEq in sterile water for injection 100 mL Cannot be calculated   propofol (Diprivan) injection 10 mg/mL 460.7 mg   rocuronium (ZeMuron) 50 mg/5 mL injection 90 mg   sennosides (Senokot) tablet 17.2 mg Cannot be calculated              Anesthesia Record               Intraprocedure I/O Totals          Intake    Norepinephrine Drip 0.00 mL    The total shown is the total volume documented since Anesthesia Start was filed.    Clevidipine Drip 0.00 mL    The total shown is the total volume documented since Anesthesia  Start was filed.    Phenylephrine Drip 0.00 mL    The total shown is the total volume documented since Anesthesia Start was filed.    Total Intake 0 mL          Specimen:   ID Type Source Tests Collected by Time   1 : CAROTID PLAQUE Tissue PLAQUE SURGICAL PATHOLOGY EXAM Gil Dos Santos MD 11/18/2024 7698                  Findings: thick calcified carotid plaque, neuromonitoring at baseline at the end of the case    Complications:  None; patient tolerated the procedure well.     Disposition: ICU - extubated and stable.  Condition: stable  Specimens Collected:   ID Type Source Tests Collected by Time   1 : CAROTID PLAQUE Tissue PLAQUE SURGICAL PATHOLOGY EXAM Gil Dos Santos MD 11/18/2024 0161     Attending Attestation: I was present and scrubbed for the key portions of the procedure.    Gil Dos Santos  Phone Number: [phone]

## 2024-11-18 NOTE — ANESTHESIA PROCEDURE NOTES
Arterial Line:    Date/Time: 11/18/2024 8:50 AM    Staffing  Performed: attending   Authorized by: Mary Manrique MD    Performed by: DELBERT Luis    An arterial line was placed. Procedure performed using ultrasound guidance.in the OR for the following indication(s): continuous blood pressure monitoring.    A 20 gauge (size), 12 cm (length), Angiocath (type) catheter was placed into the Left brachial artery, secured by Tegaderamone   Selluciana technique used.  Events:  hematoma during insertion and greater than 3 attempts.      Additional notes:  L brachial omar x1 unable to advance wire. US used. R Radial omar unable to pass micropuncture wire. L brachial with micropuncture wire used to successfully place catheter. Pressure dressing applied on L brachial. Distal pulses palpated bilaterally.

## 2024-11-18 NOTE — PROGRESS NOTES
Communication Note    Patient Name: Zulma Rao  MRN: [id number]  Today's Date: 11/18/2024   Room: [facility]*    Discipline: Occupational Therapy      Missed Visit: Yes  Missed Visit Reason:  (Pt off the floor in OR for L CEA. Defer OT at this time.)      11/18/24 at 7:50 AM   Marii Yeager, OT   Rehab Office: [phone]

## 2024-11-18 NOTE — OP NOTE
LEFT Endarterectomy Carotid Artery (L) Operative Note     Date: 2024 - 2024  OR Location: [facility] OR    Name: Zulma Rao, : 1945, Age: 79 y.o., MRN: [id number], Sex: female    Diagnosis  Pre-op Diagnosis      * Cerebrovascular accident (CVA) due to occlusion of left middle cerebral artery (Multi) [I63.512] Post-op Diagnosis     * Cerebrovascular accident (CVA) due to occlusion of left middle cerebral artery (Multi) [I63.512]     Procedures  LEFT Endarterectomy Carotid Artery  94013 - [place] TEAEC W/PATCH GRF CAROTID VERTB SUBCLAV NECK INC      Surgeons      * Gil Dos Santos - Primary    Resident/Fellow/Other Assistant:  Surgeons and Role:     * Aleida Cornelius MD - Resident - Assisting     * Clement Watts MD - Fellow    Staff:   Circulator: Depeti Davis Person: Trudi Gould Circulator: Mary Ann Gould Circulator: Reba    Anesthesia Staff: Anesthesiologist: Mary Manrique MD  C-AA: DELBERT Luis    Procedure Summary  Anesthesia: Anesthesia type not filed in the log.  ASA: IV  Estimated Blood Loss: mL  Intra-op Medications:   Administrations occurring from 0655 to 1205 on 24:   Medication Name Total Dose   lidocaine-epinephrine (Xylocaine W/EPI) 0.5 %-1:200,000 injection 10 mL   polymyxin B 500,000 Units in sodium chloride 0.9 % 1,000 mL irrigation 1,000 mL   heparin (porcine) 25,000 Units in sodium chloride 0.9 % 250 mL irrigation 25,000 Units   acetaminophen (Tylenol) tablet 650 mg Cannot be calculated   alteplase (Cathflo Activase) injection 2 mg Cannot be calculated   aspirin chewable tablet 81 mg Cannot be calculated   atorvastatin (Lipitor) tablet 80 mg Cannot be calculated   azithromycin (Zithromax) tablet 250 mg Cannot be calculated   busPIRone (Buspar) tablet 10 mg Cannot be calculated   calcium gluconate 1 g in sodium chloride (iso) IV 50 mL Cannot be calculated   calcium gluconate 2 g in sodium chloride (iso)  mL Cannot be calculated    ceFAZolin (Ancef) vial 1 g 2 g   clevidipine (Cleviprex) 25 mg/50 mL IV 1.85 mg   clopidogrel (Plavix) tablet 75 mg Cannot be calculated   dexAMETHasone (Decadron) 10 mg/mL 6 mg   dextrose 50 % injection 12.5 g Cannot be calculated   dextrose 50 % injection 25 g Cannot be calculated   fentaNYL (Sublimaze) injection 50 mcg/mL 100 mcg   glucagon (Glucagen) injection 1 mg Cannot be calculated   glucagon (Glucagen) injection 1 mg Cannot be calculated   heparin (porcine) injection 5,000 Units 5,000 Units   HYDROmorphone (Dilaudid) injection 0.2 mg Cannot be calculated   insulin lispro injection 0-5 Units Cannot be calculated   ipratropium-albuteroL (Duo-Neb) 0.5-2.5 mg/3 mL nebulizer solution 3 mL Cannot be calculated   levothyroxine (Synthroid, Levoxyl) tablet 25 mcg Cannot be calculated   lidocaine (Xylocaine) injection 2 % 80 mg   lidocaine 4 % patch 1 patch Cannot be calculated   magnesium sulfate 2 g in sterile water for injection 50 mL Cannot be calculated   magnesium sulfate 4 g in sterile water for injection 100 mL Cannot be calculated   melatonin tablet 3 mg Cannot be calculated   midodrine (Proamatine) tablet 5 mg Cannot be calculated   norepinephrine (Levophed) 8 mg / 250 mL NS  (premix) 1.03 mg   norepinephrine (Levophed) 16 mcg/mL IV bolus 24 mcg   phenylephrine (Miguel-Synephrine) 10 mg in sodium chloride 0.9% 250 mL (0.04 mg/mL) infusion (premix) 0.74 mg   polyethylene glycol (Glycolax, Miralax) packet 17 g Cannot be calculated   potassium chloride 20 mEq in sterile water for injection 100 mL Cannot be calculated   propofol (Diprivan) injection 10 mg/mL 460.7 mg   rocuronium (ZeMuron) 50 mg/5 mL injection 90 mg   sennosides (Senokot) tablet 17.2 mg Cannot be calculated              Anesthesia Record               Intraprocedure I/O Totals          Intake    Norepinephrine Drip 0.00 mL    The total shown is the total volume documented since Anesthesia Start was filed.    Clevidipine Drip 0.00 mL    The total  shown is the total volume documented since Anesthesia Start was filed.    Phenylephrine Drip 0.00 mL    The total shown is the total volume documented since Anesthesia Start was filed.    Total Intake 0 mL          Specimen:   ID Type Source Tests Collected by Time   1 : CAROTID PLAQUE Tissue PLAQUE SURGICAL PATHOLOGY EXAM Gil Dos Santos MD 11/18/2024 1146                 Drains and/or Catheters:   Urethral Catheter (Active)   Site Assessment Clean;Skin intact 11/18/24 0245   Collection Container Urometer 11/17/24 1600   Securement Method Securing device (Describe) 11/17/24 1600   Reason for Continuing Urinary Catheterization accurate hourly measurement of urine volume in a critically ill patient that cannot be assessed by other volumes and urine collection strategies 11/17/24 1600   Output (mL) 30 mL 11/18/24 0600       Tourniquet Times:         Implants:     Findings:     Indications: Zulma Rao is an 79 y.o. female who is having surgery for Cerebrovascular accident (CVA) due to occlusion of left middle cerebral artery (Multi) [I63.512].     The patient was seen in the preoperative area. The risks, benefits, complications, treatment options, non-operative alternatives, expected recovery and outcomes were discussed with the patient. The possibilities of reaction to medication, pulmonary aspiration, injury to surrounding structures, bleeding, recurrent infection, the need for additional procedures, failure to diagnose a condition, and creating a complication requiring transfusion or operation were discussed with the patient. The patient concurred with the proposed plan, giving informed consent.  The site of surgery was properly noted/marked if necessary per policy. The patient has been actively warmed in preoperative area. Preoperative antibiotics  Venous thrombosis prophylaxis     Procedure Details: Patient was placed supine and the left sided vertical skin incision made exposing the carotid artery  and bifurcation medial to sternocleidomastoid muscle temporary clip occlusion was utilized with heparinization to open the carotid artery throughout its entire extent from the common to the internal eye calcified severely stenosed atheromatous plaque was removed completely from the common and internal carotid artery after this was done the endarterectomy was sewn in a watertight fashion using 6-0 running Prolene sutures there was some reduction of brainwave monitoring during the suturing portion which returned to baseline after all temporary clips were removed there were no complications.  Complications:      Disposition:   Condition:                  Additional Details:     Attending Attestation:     Gil Dos Santos  Phone Number: [phone]

## 2024-11-18 NOTE — CARE PLAN
Problem: General Stroke  Goal: Demonstrate improvement in neurological exam throughout the shift  Outcome: Progressing  Goal: No symptoms of aspiration throughout shift  Outcome: Progressing  Goal: Controlled blood glucose throughout shift  Outcome: Progressing     Problem: Skin  Goal: Participates in plan/prevention/treatment measures  Outcome: Progressing  Goal: Promote skin healing  Outcome: Progressing

## 2024-11-18 NOTE — PROGRESS NOTES
[facility]  NEUROSCIENCE INTENSIVE CARE UNIT  DAILY PROGRESS NOTE       Patient Name: Zulma Rao   MRN: [id number]     Admit Date: 2024     : 1945 AGE: 79 y.o. GENDER: female        Subjective    79 y.o. female with PMH prior R sided stroke with left sided deficit (per personal review of imaging, L sided posterior limb of internal capsule) 2024, bilateral carotid artery stenosis S/P R CEA , HFpEF, NSTEMI, T2DM, HT, DLD, hypothyroidism, COPD, HCC S/P partial hepatectomy (), anxiety, b/l cataracts.  Admitted 2024 with Cerebrovascular accident (CVA) due to occlusion of left middle cerebral artery (Multi) after transferred for mechanical thrombectomy. CTA 74% L carotid artery occlusion proximal to bifurcation, moderate R V2 narrowing, R carotid occlusion at origin.    History obtained from  Mynor, who reported that pt was home for lunch today, last known well was 12pm. She was standing in the kitchen, and suddenly started having whole body shaking. He was able to catch her before falling to ground. Noticed that she had left sided facial droop that was new and pt was looking to the left, not speaking or communicating with him. He called 911 and EMS brought her to the [facility] ED. NIH on arrival was 20. BP on arrival was:     CTH completed showing dense left MCA sign. CTA head and neck completed showing proximal left M1 occlusion and what appears to be chronic right ICA occlusion. She was given TNK at 12:55. Life flighted to [facility] for MT pamelaal. NIH on arrival was 21 (breakdown below), BP on arrival was 130/93 with BG 93. Mechanical thrombectomy completed and post NIH score was:  Admitted to NSU.      Pt's  reports history of stroke in July of this year for which she originally had left sided weakness and facial droop that completely resolved and was back to her normal baseline prior to stroke. Was able to ambulate independently, but then fell in September  and broke her right hip requiring surgical fixation. She was doing well, walking with walker for long distance but recently has been ambulating independently.  reported no history of arrhythmias and reports pt was taking daily plavix.     Significant Events:  - 11/11: TNK and spontaneous recanalization on angiogram TICI 3  - 11/13: Had pressure-dependent exam, placed on Levo and Miguel. NIH 4  - 11/14: Repeat MRI, increased infarction size, keep -200  - 11/15: Start midodrine for BP control  - 11/18: Some minor delirium (thought it was lunchtime during night time); patient returned from L CEA    Interval Events:   Overnight: NAEON, levo down to .07 then up to .12  AM: Denies any pain or discomfort. Feels better today compared to yesterday.     Objective   VITALS (24H):  Temp:  [35.9 °C (96.6 °F)-37 °C (98.6 °F)] 35.9 °C (96.6 °F)  Heart Rate:  [] 76  Resp:  [13-27] 15  BP: (131-200)/() 186/86  Arterial Line BP 1: (173-285)/() 285/282  INTAKE/OUTPUT:  Intake/Output Summary (Last 24 hours) at 11/18/2024 0751  Last data filed at 11/18/2024 0600  Gross per 24 hour   Intake 285.27 ml   Output 1880 ml   Net -1594.73 ml     VENT SETTINGS:        PHYSICAL EXAM:  NEURO:   - Alert, follows simple commands  - EOS, PERRL, EOMI, VFF  - NIHSS as below  CV:  - RRR on telemetry, NSR  RESP:  - No signs of resp distress  :  - External catheter in place  GI:  - Abdomen NT/ND, soft  SKIN:  - Intact    11/18/24 @ 8:00AM   NIHSS:   - Level of consciousness: 0 = Alert  - LOC Question: 0 = Both correct  - LOC Commands: 0 = Obeys both  - Best Gaze: 0 = Normal  - Visual Field: 0 = No visual loss  - Facial Paresis: 0 = Normal  - LUE: 0 = No drift; 10 sec  - RUE: 2 = Effort vs gravity  - LLE: 0 = No drift; 5 sec  - RLE: 1 = Drift  - Limb Ataxia: 0 = Absent  - Sensory: 0 = Absent  - Best Language: 0 = No aphasia  - Dysarthria: 0 = Normal  - Neglect: 0 = None  TOTAL: 3       MEDICATIONS:  Scheduled: PRN:  Continuous:   [Transfer Hold] aspirin, 81 mg, oral, Daily  [Transfer Hold] atorvastatin, 80 mg, oral, Nightly  [Transfer Hold] azithromycin, 250 mg, oral, Once per day on Monday Wednesday Friday  [Transfer Hold] busPIRone, 10 mg, oral, TID  [Held by provider] clopidogrel, 75 mg, oral, Daily  [Transfer Hold] pantoprazole, 40 mg, oral, Daily before breakfast   Or  [Transfer Hold] esomeprazole, 40 mg, nasoduodenal tube, Daily before breakfast   Or  [Transfer Hold] pantoprazole, 40 mg, intravenous, Daily before breakfast  [Transfer Hold] tiotropium, 2 puff, inhalation, Daily   And  [Transfer Hold] fluticasone furoate-vilanteroL, 1 puff, inhalation, Daily  [Held by provider] heparin (porcine), 5,000 Units, subcutaneous, q8h  [Transfer Hold] insulin lispro, 0-5 Units, subcutaneous, TID AC  [Transfer Hold] levothyroxine, 25 mcg, oral, Daily  [Transfer Hold] lidocaine, 1 patch, transdermal, Daily  [Transfer Hold] melatonin, 3 mg, oral, Nightly  [Transfer Hold] midodrine, 5 mg, oral, TID  [Transfer Hold] polyethylene glycol, 17 g, oral, Daily  [Transfer Hold] sennosides, 2 tablet, oral, BID     PRN medications: [Transfer Hold] acetaminophen, [Transfer Hold] alteplase, [Transfer Hold] calcium gluconate, [Transfer Hold] calcium gluconate, [Transfer Hold] dextrose, [Transfer Hold] dextrose, [Transfer Hold] glucagon, [Transfer Hold] glucagon, [Transfer Hold] HYDROmorphone, [Transfer Hold] ipratropium-albuteroL, [Transfer Hold] magnesium sulfate, [Transfer Hold] magnesium sulfate, [Transfer Hold] ondansetron ODT **OR** [Transfer Hold] ondansetron, oxygen, [Transfer Hold] potassium chloride CR **OR** [Transfer Hold] potassium chloride, [Transfer Hold] potassium chloride CR **OR** [Transfer Hold] potassium chloride, [Transfer Hold] potassium chloride norepinephrine, 0-0.5 mcg/kg/min, Last Rate: 0.12 mcg/kg/min (11/18/24 0600)         LAB RESULTS:  Results from last 72 hours   Lab Units 11/18/24  0226 11/17/24  0215   GLUCOSE mg/dL  128* 124*   SODIUM mmol/L 141 142   POTASSIUM mmol/L 3.7 3.9   CHLORIDE mmol/L 100 103   CO2 mmol/L 30 29   ANION GAP mmol/L 15 14   BUN mg/dL 6 7   CREATININE mg/dL 0.44* 0.41*   EGFR mL/min/1.73m*2 >90 >90   CALCIUM mg/dL 9.4 8.6   PHOSPHORUS mg/dL 3.7 3.6   ALBUMIN g/dL 3.8 3.5   MAGNESIUM mg/dL 2.01 2.00   POCT CALCIUM IONIZED (MMOL/L) IN BLOOD mmol/L 1.20 1.15      Results from last 72 hours   Lab Units 11/18/24 0226 11/17/24  0215   WBC AUTO x10*3/uL 9.3 9.3   NRBC AUTO /100 WBCs 0.0 0.0   RBC AUTO x10*6/uL 3.77* 3.58*   HEMOGLOBIN g/dL 11.4* 10.6*   HEMATOCRIT % 33.0* 31.1*   MCV fL 88 87   MCH pg 30.2 29.6   MCHC g/dL 34.5 34.1   RDW % 13.8 13.6   PLATELETS AUTO x10*3/uL 221 218   NEUTROS PCT AUTO % 66.0 62.3   IG PCT AUTO % 0.8 0.4   LYMPHS PCT AUTO % 14.7 16.6   MONOS PCT AUTO % 11.2 12.8   EOS PCT AUTO % 6.3 7.0   BASOS PCT AUTO % 1.0 0.9   NEUTROS ABS x10*3/uL 6.12* 5.79*   IG AUTO x10*3/uL 0.07 0.04   LYMPHS ABS AUTO x10*3/uL 1.36 1.54   MONOS ABS AUTO x10*3/uL 1.04* 1.19*   EOS ABS AUTO x10*3/uL 0.58* 0.65*   BASOS ABS AUTO x10*3/uL 0.09 0.08      Results from last 72 hours   Lab Units 11/18/24 0226 11/17/24  0215   ALBUMIN g/dL 3.8 3.5      IMAGING RESULTS:  MRI (11/14)  1. Evolving infarcts located within the left cerebral hemisphere,  including in the watershed distribution, overall more pronounced in  appearance but similar in extent with no hemorrhagic transformation  or surrounding mass effect.  2. Additional evolving acute infarcts are seen located within the  bilateral cerebral hemispheres and are stable to slightly more  pronounced in appearance. Few new punctate acute infarcts located  within the left cerebral hemisphere. There is no surrounding mass  effect or hemorrhagic transformation.    MRI (11/13)  1. Acute/subacute watershed ischemia throughout the left cerebral  hemisphere  2. Subcentimeter cortical infarctions involving the left superior  frontal lobe but also involving the left  parietal and temporal lobes.  No evidence of hemorrhagic transformation.  3. Nonspecific white matter changes which can be seen in the setting  of chronic microangiopathy.    Reviewed OSH imaging on PACS  CT head wo IV contrast    Result Date: 11/12/2024  CTA neck:   Complete occlusion of the right common carotid artery and right cervical internal carotid artery.   Coarse atherosclerotic calcification at the left carotid bifurcation with approximately 65% stenosis by NASCET criteria.   The vertebral arteries are patent. The left vertebral artery is dominant. There is a coarse calcification at the origin of the right vertebral artery.   Calcified and noncalcified plaque along the aortic arch and origins of the great vessels with narrowing.   CTA head:   There is an abrupt cutoff of the distal left anterior cerebral artery.   Occlusion of the right petrous and cavernous carotid arteries with reconstitution in the supraclinoid segment.   The left intracranial carotid artery is patent with coarse atherosclerotic calcification and mild narrowing.   The posterior circulation is patent without significant stenosis.   CT head:   No acute intracranial hemorrhage or mass effect.   I personally reviewed the images/study and I agree with the findings as stated by Simone Joy MD (Radiology Resident).   MACRO: Simone Joy discussed the significance and urgency of this critical finding by telephone with  Jeaneth Graves MD on 11/12/2024 at 5:59 am.  (**-RCF-**) Findings:  See findings.   .   Signed by: Latesha Zuniga 11/12/2024 7:20 AM Dictation workstation:   [id number]    CT angio head and neck w and wo IV contrast    Result Date: 11/12/2024  CTA neck:   Complete occlusion of the right common carotid artery and right cervical internal carotid artery.   Coarse atherosclerotic calcification at the left carotid bifurcation with approximately 65% stenosis by NASCET criteria.   The vertebral arteries  are patent. The left vertebral artery is dominant. There is a coarse calcification at the origin of the right vertebral artery.   Calcified and noncalcified plaque along the aortic arch and origins of the great vessels with narrowing.   CTA head:   There is an abrupt cutoff of the distal left anterior cerebral artery.   Occlusion of the right petrous and cavernous carotid arteries with reconstitution in the supraclinoid segment.   The left intracranial carotid artery is patent with coarse atherosclerotic calcification and mild narrowing.   The posterior circulation is patent without significant stenosis.   CT head:   No acute intracranial hemorrhage or mass effect.   I personally reviewed the images/study and I agree with the findings as stated by Simone Joy MD (Radiology Resident).   MACRO: Simone Joy discussed the significance and urgency of this critical finding by telephone with  Jeaneth Graves MD on 11/12/2024 at 5:59 am.  (**-RCF-**) Findings:  See findings.   .   Signed by: Latesha Zuniga 11/12/2024 7:20 AM Dictation workstation:   [id number]    CT head wo IV contrast    Result Date: 11/12/2024  No acute intracranial hemorrhage or mass effect.   Small focal area of encephalomalacia and gliosis within the right occipital lobe.   Mild white matter changes compatible with microangiopathy. Lacunar infarct within the right basal ganglia.   I personally reviewed the images/study and I agree with the findings as stated by Simone Joy MD (Radiology Resident).   MACRO: None   Signed by: Latesha Zuniga 11/12/2024 7:03 AM Dictation workstation:   [id number]       Assessment/Plan    Update 11/18/24  - NSGY following per note: OR Mon 11/18 for L CEA  - [place] following, per note: BP goal 180-200 until carotid revascularization, c/w ASA, hold plavix, continue SCDs and Q8h subcutaneous heparin, ctn atorvastatin 80mg  - cardene gtt started for -160 goal; transition to  PRNs when able (labetalol and hydralazine)  - per NSGY okay for aspirin; restart plavix 1 week from 11/18    Dispo:  Stroke  PT/OT: PT - moderate intensity, OT - moderate intensity  - Contact vas Sx fellow over chat closer to discharge to coordinate follow up PAD with aortic occlusion  -----    NEURO:  # L MCA stroke S/P TNK, Spontaneous recanalization TICI 3  # R sided stroke with left sided deficit (per personal review of imaging L sided posterior limb of internal capsule) 7/2024  # Bilateral carotid artery stenosis S/P R CEA 2011  Assessment:  - Neurologically: see exam above  - Fluctuating exams at times  - LDL (11/11) 92, A1c (11/11) 5.8  - BNP (11/11) 188  - CTA HN (11/12): complete occlusion of R CCA and R cervical ICA. 65% stenosis of L   Carotid bifurcation, abrupt cutoff of the distal L DAK[place], occlusion of the right petrous and cavernous carotid arteries  - MRI Brain (11/12): Acute/subacute watershed ischemia throughout the left cerebral hemisphere, subcentimeter cortical infarctions upon the left superior frontal lobe, left parietal and temporal lobes  - CTA HN (11/13): Interval increased CT conspicuity, unchanged occlusion of the left DAKOTA callosal marginal branch, R CCA, R ICA, unchanged flow stenosis in L ICA, L CCA, R>L subclavian artery  - vEEG (final): indicative of left hemispheric structural lesion. No epileptiform discharges are noted seen  - MRI (11/14): evolving infacts within L cerebral hemisphere, including in the watershed distribution, more pronounced but similar in extent transformation. Additional evolving acute infarcts within the bilateral cerebral hemispheres. Few new punctate acute infarcts located within the left cerebral hemisphere.   Plan:  - NSU  - Neuro Checks: Q2H  - Sedation: None  - Pain: acetaminophen PRN, Q6H  - Nausea: ondansetron  - PT/OT/SLP  - c/w home Atorvastatin 80mg  - c/w aspirin 81mg   - Hold plavix 75mg  - Cardiology signed off, per note: no cardiac contraindications  to planned procedure, no further cardiac work up warranted  - [place]oke following, per note: c/w ASA, hold plavix, continue SCDs and Q8h subcutaneous heparin, ctn atorvastatin 80mg  - holding DVT prophylaxis post-procedure until safe  - SBP post L CEA: 140-160 SBP  - started cardene gtt for BP goal    CARDIOVASCULAR:  # Severe PAD 2/2 Chronic infrarenal aortic occlusion  # HFpEF  # History of HTN, HLD  Assessment:  - EKG: pending  - ECHO 11/12/24: LVEF 65-70%, LVH, severely dilated LA, negative bubble study  - CT Aorti-ileofemoral w/ runoff: Extensive vascular disease including occlusion of the infrarenal abdominal aorta, bilateral common iliac arteries, no flow appreciated left PT   Plan:  - Continue to monitor on telemetry  - BP goal: 180-200  --> on Levo, wean as tolerated  - c/w monitor doppler  - Vasc Sx signed off, per note: no acute intervention . Good flow to radial artery. Chronic infrarenal aortic occlusion. Continue to monitor doppler signals  - c/w midodrine 5mg TID    RESPIRATORY:  # COPD  Assessment:  - PFT (10/05/2023 at OSH): FEV1 50% predicted. FEV1/FVC 69%. No TLC obtained. DLCO 40%   Plan:  - Continuous pulse oximetry   - O2 PRN to maintain SpO2 > 88%, wean as tolerated  - Incentive spirometry while awake  - Continue Trelegy Ellipta (change to inpatient formula)  - Wean O2, maintain SpO2 88-92%    RENAL/:  #JAIME, resolved  Assessment:  - Baseline BUN/Cr: 12/0.63  Results from last 72 hours   Lab Units 11/18/24  0226 11/17/24  0215   BUN mg/dL 6 7   CREATININE mg/dL 0.44* 0.41*       - likely from contrast, now resolved with renal function return to baseline  Plan:  - Monitor with daily RFP  - Maintain external urinary diversion device for strict I&O    FEN/GI:  #HCC S/P partial hepatectomy (2019)  #Oral dysphagia  Assessment:  - Last BM Date: 11/16/24  Plan:  - Monitor and replace electrolytes per protocol  - IVF: None  - NPO Diet; Effective midnight  - Bowel Regimen: Docusate-Senna BID and Miralax  QD  - SLP following, per note: Minced and moist, thin liquid    ENDOCRINE:  # T2DM  # Hypothyroidism  Assessment:  Results from last 7 days   Lab Units 24  02224  1944 24  1626 24  1218 24  0756 24  0215 24  1953 11/16/24  1523   POCT GLUCOSE mg/dL  --  115* 120* 241* 114*  --  170* 110*   GLUCOSE mg/dL 128*  --   --   --   --  124*  --   --    - HbA1C: 6.3 2024   Plan:  - Accuchecks & ISS AC&HS  - Hold home metformin  - Continue home Levothyroxine  - Hypoglycemia monitoring     HEMATOLOGY:  #Chronic anemia  Assessment:  - Baseline Hgb: 11.6  - Baseline Plts: 258  Results from last 72 hours   Lab Units 245   WBC AUTO x10*3/uL 9.3 9.3   NRBC AUTO /100 WBCs 0.0 0.0   RBC AUTO x10*6/uL 3.77* 3.58*   HEMOGLOBIN g/dL 11.4* 10.6*   HEMATOCRIT % 33.0* 31.1*   MCV fL 88 87   MCH pg 30.2 29.6   MCHC g/dL 34.5 34.1   RDW % 13.8 13.6   PLATELETS AUTO x10*3/uL 221 218      Plan:  - Continue to monitor with daily CBC and Coag panel    INFECTIOUS DISEASE:  #JOSE  Assessment:  Results from last 7 days   Lab Units 24   WBC AUTO x10*3/uL 9.3 9.3    - Temp (24hrs), Av.6 °C (97.9 °F), Min:35.9 °C (96.6 °F), Max:37 °C (98.6 °F)     Plan:  - Continue to monitor for s/sx of infection  - Pan culture for temperature > 38.4 C    MUSCULOSKELETAL:  - No acute issues    SKIN:  - No acute issues  - Turns and skin care per NSU protocol    ACCESS:  - PIV    PROPHYLAXIS:  - DVT Ppx: SCDs and SQH  - GI Ppx: Pantoprazole    RESTRAINTS:  Not indicated/Patient does not meet criteria for restraints    Marty Drummond MD PhD  Neuro Critical Care    Attending Attestation:     Neuro - R carotid stenosis, L carotid occlusion, p/w stroke s/p R CEA   - keep -160  Cardiac - hemodynamically stable  Respiratory - RA  Renal - no acute issues  GI - ADAT      Code status - full code     40 minutes was spent in total critical care time.     Sujit Estrella MD     of Neurosurgery   [facility] [place]   [facility]   Office: [phone]  Fax: [phone]

## 2024-11-18 NOTE — PROGRESS NOTES
Zulma Rao is a 79 y.o. female on day 7 of admission presenting with Cerebrovascular accident (CVA) due to occlusion of left middle cerebral artery (Multi).    Subjective   No acute events overnight. No new concerns this morning. Patient remains on vasopressor therapy with norepinephrine for SBP goal of 180-200. Arterial Line BP monitor removed 11/17/2024 due to dysfunction.     Patient does not endorse any extremity pain this morning, which she has intermittently endorsed bilaterally, although she notes difficulty in moving her right arm. She understands she is scheduled for her L CEA procedure this morning with neurosurgery. Anesthesia spoke to patient, brought patient down to OR during exam.          Objective   Overnight BP ranges 11/18 000-0700: 136-190 / 69-96.   BP  136/69 @ 0400  SPO2 range ovn:  on RA      11/18/2024    12:00 AM 11/18/2024     1:00 AM 11/18/2024     2:00 AM 11/18/2024     3:00 AM 11/18/2024     4:00 AM 11/18/2024     5:00 AM 11/18/2024     6:00 AM   Vitals   Systolic 190 186 187 152 136 161 186   Diastolic 96 85 95 93 69 85 86   Heart Rate 97 88 82 76 71 76 76   Resp 23 18 18 20 14 17 15       Neurological Exam  Mental Status   Oriented to person, place, time and situation. Speech is normal. Speech: No noted dysarthria. No receptive/expressive aphasias noted. Able to name objects, repeat and read.    Cranial Nerves  CN II: Visual fields intact bilaterally in all quadrants to threat - blinks to threat on exam bilaterally   CN III, IV, VI: Extraocular movements intact bilaterally.   Right pupil: 4 mm, Round. Reactive to accommodation.   Left pupil: 4 mm, Round. Reactive to accommodation.  CN V: Facial sensation is normal.  CN VII: Full and symmetric facial movement.  CN VIII: Hearing grossly intact to conversation.    CN 9-12, B/L pupillary reflex not assessed this morning, as patient was taken down to OR.     Motor  Normal muscle bulk throughout. No fasciculations present. Normal  muscle tone.  Moves all extremities.   LUE: No drift after 10 seconds, strong   LLE: No drift after 5 seconds  RUE: strong  but unable to lift antigravity,   RLE: No drift after 5 seconds    Sensory  Extremity sensation grossly intact and symmetric to light touch bilaterally.     Coordination: not assessed this morning, as patient was taken down to OR.     NIHSS    1a  Level of consciousness: 0=alert; keenly responsive   1b. LOC questions:  0=Performs both tasks correctly   1c. LOC commands: 0=Performs both tasks correctly   2.  Best Gaze: 0=normal   3. Visual: 0=No visual loss   4. Facial Palsy: 0=Normal symmetric movement   5a. Motor Left Arm: 0=No drift, limb holds 90 (or 45) degrees for full 10 seconds   5b.  Motor Right Arm: 2=Some effort against gravity, limb cannot get to or maintain (if cured) 90 (or 45) degrees, drifts down to bed, but has some effort against gravity   6a. Motor Left Le=No drift, limb holds 90 (or 45) degrees for full 10 seconds   6b  Motor Right Le=No drift, limb holds 90 (or 45) degrees for full 10 seconds   7. Limb Ataxia: 0=Absent   8.  Sensory: 0=Normal; no sensory loss   9. Best Language:  0=No aphasia, normal   10. Dysarthria: 0=Normal   11. Extinction and Inattention: 0=No abnormality          Total:   2     Relevant Results  [place] Coma Scale  Best Eye Response: Spontaneous  Best Verbal Response: Oriented  Best Motor Response: Follows commands  Jim Coma Scale Score: 15        Scheduled medications  [Transfer Hold] aspirin, 81 mg, oral, Daily  [Transfer Hold] atorvastatin, 80 mg, oral, Nightly  [Transfer Hold] azithromycin, 250 mg, oral, Once per day on   [Transfer Hold] busPIRone, 10 mg, oral, TID  [Held by provider] clopidogrel, 75 mg, oral, Daily  [Transfer Hold] pantoprazole, 40 mg, oral, Daily before breakfast   Or  [Transfer Hold] esomeprazole, 40 mg, nasoduodenal tube, Daily before breakfast   Or  [Transfer Hold] pantoprazole, 40  mg, intravenous, Daily before breakfast  [Transfer Hold] tiotropium, 2 puff, inhalation, Daily   And  [Transfer Hold] fluticasone furoate-vilanteroL, 1 puff, inhalation, Daily  [Held by provider] heparin (porcine), 5,000 Units, subcutaneous, q8h  [Transfer Hold] insulin lispro, 0-5 Units, subcutaneous, TID AC  [Transfer Hold] levothyroxine, 25 mcg, oral, Daily  [Transfer Hold] lidocaine, 1 patch, transdermal, Daily  [Transfer Hold] melatonin, 3 mg, oral, Nightly  [Transfer Hold] midodrine, 5 mg, oral, TID  [Transfer Hold] polyethylene glycol, 17 g, oral, Daily  [Transfer Hold] sennosides, 2 tablet, oral, BID      Continuous medications  norepinephrine, 0-0.5 mcg/kg/min, Last Rate: 0.12 mcg/kg/min (11/18/24 0600)      PRN medications  PRN medications: [Transfer Hold] acetaminophen, [Transfer Hold] alteplase, [Transfer Hold] calcium gluconate, [Transfer Hold] calcium gluconate, [Transfer Hold] dextrose, [Transfer Hold] dextrose, [Transfer Hold] glucagon, [Transfer Hold] glucagon, [Transfer Hold] HYDROmorphone, [Transfer Hold] ipratropium-albuteroL, [Transfer Hold] magnesium sulfate, [Transfer Hold] magnesium sulfate, [Transfer Hold] ondansetron ODT **OR** [Transfer Hold] ondansetron, oxygen, [Transfer Hold] potassium chloride CR **OR** [Transfer Hold] potassium chloride, [Transfer Hold] potassium chloride CR **OR** [Transfer Hold] potassium chloride, [Transfer Hold] potassium chloride    Labs:  Results from last 72 hours   Lab Units 11/18/24 0226 11/17/24 0215 11/16/24  0134   SODIUM mmol/L 141 142 142   POTASSIUM mmol/L 3.7 3.9 3.3*   BUN mg/dL 6 7 8   CREATININE mg/dL 0.44* 0.41* 0.39*   CALCIUM mg/dL 9.4 8.6 8.3*   MAGNESIUM mg/dL 2.01 2.00 2.11      Results from last 72 hours   Lab Units 11/18/24 0226 11/17/24 0215 11/16/24  0134   WBC AUTO x10*3/uL 9.3 9.3 9.0   HEMOGLOBIN g/dL 11.4* 10.6* 10.2*   HEMATOCRIT % 33.0* 31.1* 31.4*   PLATELETS AUTO x10*3/uL 221 218 186     Coagulation Screen  11/17/2024 0910       Component  Ref Range & Units 1 d ago   Protime  9.8 - 12.8 seconds 12.6   INR  0.9 - 1.1 1.1   aPTT  27 - 38 seconds 27                    IMAGING:     MRI 11/14/2024  FINDINGS:  CSF Spaces: Diffuse prominence of the ventricles and sulci is noted.  The basal cisterns are clear. Appearance is unchanged since the  recent MRI brain study.      Parenchyma: Compared to MRI 11/13/2024, interval increase in size of  restricted diffusion with associated T2/FLAIR hyperintensity  involving the left cerebral hemispheric white matter primarily within  the white matter. Additional foci of restricted diffusion with  associated T2/FLAIR hyperintensity are noted within the bilateral  frontal lobes including the precentral gyri, left parietal lobe, left  temporal lobe, and left insula as well as the left mesial temporal  lobe. No striking hemorrhagic transformation or striking mass effect.      Few new small foci of restricted diffusion are seen, including within  the left parietal lobe gray-white matter junction (series 3 image 50  of 76) and a small focus (Series 3, Image 53) of restricted diffusion  located within the left parietal lobe gray matter (series 3, image 53  of 76) was faintly seen on the prior MRI.      Encephalomalacia/gliosis is again noted in the posterior right  occipital lobe. Chronic right thalamic infarct is again seen.  Additional patchy areas of T2/FLAIR hyperintensity are noted within  the deep, periventricular, and subcortical white matter bilaterally,  likely related to chronic small vessel ischemic changes. Small old  infarct in the right cerebellum is unchanged. There is no mass effect  or midline shift.      Loss of flow void located within distal right cervical ICA and  portions of the right intracranial ICA consistent with occlusion and  better evaluated on the prior CTA head study.      Paranasal Sinuses and Mastoids: Visualized paranasal sinuses and  mastoid air cells are unremarkable.       IMPRESSION:  1. Evolving infarcts located within the left cerebral hemisphere,  including in the watershed distribution, overall more pronounced in  appearance but similar in extent with no hemorrhagic transformation  or surrounding mass effect.      2. Additional evolving acute infarcts are seen located within the  bilateral cerebral hemispheres and are stable to slightly more  pronounced in appearance. Few new punctate acute infarcts located  within the left cerebral hemisphere. There is no surrounding mass  effect or hemorrhagic transformation.     IMAGING:  MR brain wo IV contrast  Result Date: 11/13/2024 1:26 am     FINDINGS:   CSF Spaces: The ventricles, sulci and basal cisterns are within normal limits for patient's age.     Parenchyma: Watershed distribution areas of T2/FLAIR hyperintense signal with associated diffusion restriction throughout the left cerebral hemisphere most pronounced in the superior left frontal lobe but also involving the parietal and medial temporal lobes. Finding is consistent with acute/subacute watershed infarction.   Cortical foci of diffusion restriction in the left frontal lobe cortex along the interhemispheric aspect, in the left sylvian fissure and posterior left insula and in the left posterior temporal lobe. These findings are consistent with non watershed distribution   2.5 cm focus of encephalomalacia within the right occipital lobe consistent with previous infarction in the distribution of the right posterior cerebral artery. Focus of subacute/chronic lacunar infarction in the right thalamus measuring a proximally 5 mm x 15 mm in size. Focus of susceptibility artifact within the left basal ganglia favored to correspond to mineralization seen on prior CT. Nonspecific patchy T2/FLAIR hyperintense signal within the periventricular and subcortical white matter, likely sequela of chronic microangiopathy.   There is no mass effect or midline shift.        Paranasal Sinuses and  Mastoids: Visualized paranasal sinuses and mastoid air cells are unremarkable.   Note is made of postsurgical changes from bilateral lens replacements.        Impression  1. Acute/subacute watershed ischemia throughout the left cerebral hemisphere   2. Subcentimeter cortical infarctions involving the left superior frontal lobe but also involving the left parietal and temporal lobes. No evidence of hemorrhagic transformation.   3. Nonspecific white matter changes which can be seen in the setting of chronic microangiopathy.        Transthoracic Echo (TTE) Complete  11/12/2024    CONCLUSIONS:    1. Left ventricular ejection fraction is normal, by visual estimate at 65-70%.    2. Spectral Doppler shows an abnormal pattern of left ventricular diastolic filling.    3. Left ventricular cavity size is decreased.    4. No left ventricular thrombus visualized.    5. There is normal right ventricular global systolic function.    6. The left atrium is severely dilated.    7. Agitated saline contrast study was negative for intracardiac shunt.    8. Normal aortic root.    9. No prior echocardiogram available for comparison.      CT ANGIO AORTA AND BILATERAL ILIOFEMORAL RUN OFF INCLUDING WITHOUT  CONTRAST 11/12/2024  IMPRESSION:  1. Extensive vascular disease including occlusion of the infrarenal  abdominal aorta, bilateral common iliac arteries. Additionally, no  appreciated flow is noted in the left posterior tibial artery  distally. Please refer to the detailed description above.  2. Other nonspecific finding as detailed above.     CT head wo IV contrast; CT angio head and neck w and wo IV contrast  11/12/2024 5:34 am     FINDINGS: NON-CONTRAST HEAD CT:     BRAIN PARENCHYMA:  No evidence of acute intraparenchymal hemorrhage or parenchymal evidence of an acute large territory ischemic infarct. No mass-effect, midline shift or effacement of cerebral sulci. Gray-white matter distinction is preserved. Punctate calcifications within  the left-greater-than-right basal ganglia. Lacunar within the posterior limb of the right internal capsule and left basal ganglia. Small amount of encephalomalacia and gliosis within the right occipital lobe.     VENTRICLES and EXTRA-AXIAL SPACES:  No acute extra-axial or intraventricular hemorrhage. Ventricles and sulci are age-concordant.     PARANASAL SINUSES/MASTOIDS:  No hemorrhage or air-fluid levels within the visualized paranasal sinuses. The mastoids are well aerated.     CALVARIUM/ORBITS:  No skull fracture.  The orbits and globes are intact to the extent visualized.     EXTRACRANIAL SOFT TISSUES: No discernible abnormality.         CTA NECK:   There is calcified and noncalcified plaque along the aortic arch and origins of the great vessels. There is marked narrowing at the origin of the right common carotid artery and right subclavian artery. There is moderate narrowing secondary to noncalcified plaque at the origin of the left subclavian artery.     LEFT VERTEBRAL ARTERY:  No hemodynamically significant stenosis, occlusion, or dissection. The left vertebral artery is dominant.     LEFT COMMON/INTERNAL CAROTID ARTERY:  There is calcified plaque along the course of the common carotid artery with mild-to-moderate narrowing. There is coarse calcification at the carotid bifurcation and along the proximal cervical internal carotid artery with a proximally 65% stenosis by NASCET criteria. The cervical internal carotid artery is patent to the level of the skull base without additional stenosis.     RIGHT VERTEBRAL ARTERY: There is coarse atherosclerotic calcification at the origin of the right vertebral artery. No hemodynamically significant stenosis, occlusion, or dissection.     RIGHT COMMON/INTERNAL CAROTID ARTERY:  There is occlusion of the right common carotid artery just beyond the origin. There is occlusion of the right cervical internal carotid artery.   Limited images through the lung apices demonstrate  emphysematous changes. Mild degenerative changes of the cervical spine without evidence of high-grade spinal canal stenosis. Soft tissues of the neck are unremarkable.            CTA HEAD:     ANTERIOR CIRCULATION: No aneurysm.    - Internal Carotid Arteries: There is occlusion of the right petrous and cavernous internal carotid artery. There is reconstitution within the supraclinoid segment. The left intracranial carotid artery is patent. There is atherosclerotic calcification along the cavernous and supraclinoid ICA with mild narrowing.     - Middle Cerebral Arteries: No hemodynamically significant stenosis or occlusion.     - Anterior Cerebral Arteries: There is and abrupt cutoff of the distal left anterior cerebral artery (series 504, image 82). The right D[place]OTA is without hemodynamically significant stenosis or occlusion.       POSTERIOR CIRCULATION: No aneurysm.     - Intracranial Vertebral Arteries:  No hemodynamically significant stenosis or occlusion.     - Basilar Artery:  No hemodynamically significant stenosis or occlusion.     - Posterior Cerebral Arteries:  No hemodynamically significant stenosis or occlusion.        CTA NECK:     Complete occlusion of the right common carotid artery and right cervical internal carotid artery.   Coarse atherosclerotic calcification at the left carotid bifurcation with approximately 65% stenosis by NASCET criteria.   The vertebral arteries are patent. The left vertebral artery is dominant. There is a coarse calcification at the origin of the right vertebral artery.   Calcified and noncalcified plaque along the aortic arch and origins of the great vessels with narrowing.     CTA head:   There is an abrupt cutoff of the distal left anterior cerebral artery.   Occlusion of the right petrous and cavernous carotid arteries with reconstitution in the supraclinoid segment.   The left intracranial carotid artery is patent with coarse atherosclerotic calcification and mild  narrowing.   The posterior circulation is patent without significant stenosis.        CT HEAD:   No acute intracranial hemorrhage or mass effect.     ---     CT head wo IV contrast 11/11/2024 9:03 pm     FINDINGS:   CSF Spaces: Ventricles, cortical sulci and basal cisterns are prominent reflecting age related involutional changes and mild volume loss. Mildly prominent extra-axial CSF space within the bifrontal region may represent asymmetric volume loss. There is no extraaxial fluid collection.     Parenchyma: There is no acute intraparenchymal hemorrhage or parenchymal evidence of acute large territorial ischemic infarction. Patchy white matter hypodensity likely represents microangiopathy. Lucency within the right basal ganglia likely represents a lacunar infarct. Small area of encephalomalacia and gliosis within the right occipital lobe. Of note multiple intracranial vessels are hyperdense, likely secondary to recent IR neuro angiogram. The grey-white differentiation is intact. There is no mass effect or midline shift.     Calvarium: The calvarium is unremarkable.     Paranasal sinuses and mastoids: Visualized paranasal sinuses and mastoids are clear.        No acute intracranial hemorrhage or mass effect.   Small focal area of encephalomalacia and gliosis within the right occipital lobe.   Mild white matter changes compatible with microangiopathy. Lacunar infarct within the right basal ganglia.           Assessment/Plan   This patient currently has cardiac telemetry ordered; if you would like to modify or discontinue the telemetry order, click here to go to the orders activity to modify/discontinue the order.  Assessment & Plan  Cerebrovascular accident (CVA) due to occlusion of left middle cerebral artery (Multi)    Ms. Zulma Rao is an 80 YO RH white F with history of stroke in 7/2024, HTN, HLD, R CEA 2011, HFpEF, COPD, anxiety, partial hepatectomy for HCC 2019 transferred from [facility] for acute  left MCA infarct with NIH 21, s/p TNK. MT performed, but patient's left had already recanalized.     Admitted to [facility] for continued monitoring on 11/11 with NIH of 4 with small stroke burden largely in L MCA-DAKOTA watershed.  Noted to have deterioration in neurological examination (NIH 13) when SBP below 170-180 and started on vasopressor therapy.     Noted to again deteriorate in terms of R arm strength when off vasopressor therapy with repeat MRI brain on 11/14 demonstrating increased in stroke burden in the L MCA-DAKOTA watershed. Pressor therapy restarted.     Patient's  clarified she has been on DAPT since 07/2024 and therefore we conclude that she is essentially failing maximal medical therapy .     NSGY consulted for L CEA given 65% stenosis and 100% occlusion of the R ICA on CTA head/neck. L CEA OR on 11/18/24.    Lt carotid artery endarterectomy was done on 11/18, the findings were thick calcified plaque. NSGY plans for SBP target is 140-160, started on Cardene gtt.      Type: Ischemic stroke  Subtype/etiology: Artery to artery  Vessels involved: left MCA/DAKOTA watershed  Neurological manifestations: proximal > distal RUE weakness. Improved R sided facial droop, dysarthria, expressive aphasia, proximal RLE weakness.   NIHSS (worst at presentation): 21   Antiplatelet/antithrombotic plan for stroke prevention: Aspirin; clopidogrel (currently HELD prior to 11/18 L CEA)  VTE prophylaxis: SCDs, Heparin 5000 units Q8hr (held prior to 11/18 L CEA)     Vascular Risk Factor modification goals:  Blood pressure goals: avoid hypotension SBP <100 that could worsen cerebral perfusion, Ischemic stroke post-thrombectomy   Lipid Goals: education on healthy diet and statin therapy to maintain or achieve goal LDL-cholesterol < 70mg  Glucose Goals: early treatment of hyperglycemia to goal glucose 140-180 mg/dl with long-term goal A1c < 7%   Smoking Cessation and Education  Assessment for Rehabilitation needs   Patient and  family education on signs and symptoms of stroke, calling 911, healthy strategies for stroke prevention.       Plan:     # Acute Left MCA infarct s/p TNK (11/11 @ 1255) and MT (already recanalized - no Tici Score)  :: LDL 92; A1c 5.8%  :: NIHSS 21 at time [place] [facility] [place] 2 this morning.     :: MRI brain w/o contrast with L cerebral hemisphere acute-subacute ischemia; subcentimeter cortical infarcts of L superior frontal, parietal, annd temporal lobes. 11/14: Stroke burden increased in size within the same distribution as prior comparison 11/13.  :: CTA with atherosclerotic calcification at the left carotid bifurcation with approximately 65% stenosis by NASCET criteria  :: TTE: Bubble study negative. LVEF 65-70%. No thrombus. Severe LA dilatation.   :: cvEEG with no epileptiform activity.  :: Lt carotid artery endarterectomy was done on 11/18, the findings were thick calcified plaque. NSGY plans for SBP target is 140-160, started on Cardene gtt.      Recommendations:  - Q1 neuro checks, remain NSU status for vasopressor support  - BP goal 180-200 until carotid revascularization  - ASA 81 mg daily  - Hold Plavix.  - DVT Ppx: continue SCDs, ctn Q8hr subcutaneous heparin   - Continue atorvastatin 80mg  - NSGY performed carotid revascularization L CEA 11/18/2024 AM. Cardiology - RCRI 3, elevated risk but not prohibitive.   - Target -160    José Miguel Flores, MS3      I have reviewed and edited the information above and I agree with the assessment and plan as outlined by the José Miguel Flores MS3     Christian Phelps MD  Neurology, PGY-2

## 2024-11-18 NOTE — ANESTHESIA PROCEDURE NOTES
Airway  Date/Time: 11/18/2024 9:15 AM  Urgency: elective    Airway not difficult    Staffing  Performed: CHRISTIAN   Authorized by: Mary Manrique MD    Performed by: DELBERT Luis  Patient location during procedure: OR    Indications and Patient Condition  Indications for airway management: airway protection and anesthesia  Spontaneous Ventilation: absent  Sedation level: deep  Preoxygenated: yes  Patient position: sniffing  Mask difficulty assessment: 1 - vent by mask    Final Airway Details  Final airway type: endotracheal airway      Successful airway: ETT  Cuffed: yes   Successful intubation technique: direct laryngoscopy  Blade: Sushila  Blade size: #3  ETT size (mm): 6.5  Cormack-Lehane Classification: grade I - full view of glottis  Placement verified by: chest auscultation and capnometry   Measured from: gums  ETT to gums (cm): 20  Number of attempts at approach: 1

## 2024-11-19 LAB
ALBUMIN SERPL BCP-MCNC: 3.3 G/DL (ref 3.4–5)
ANION GAP SERPL CALC-SCNC: 12 MMOL/L (ref 10–20)
BASOPHILS # BLD AUTO: 0.01 X10*3/UL (ref 0–0.1)
BASOPHILS NFR BLD AUTO: 0.1 %
BUN SERPL-MCNC: 11 MG/DL (ref 6–23)
CA-I BLD-SCNC: 1.26 MMOL/L (ref 1.1–1.33)
CALCIUM SERPL-MCNC: 8.9 MG/DL (ref 8.6–10.6)
CHLORIDE SERPL-SCNC: 102 MMOL/L (ref 98–107)
CO2 SERPL-SCNC: 31 MMOL/L (ref 21–32)
CREAT SERPL-MCNC: 0.39 MG/DL (ref 0.5–1.05)
EGFRCR SERPLBLD CKD-EPI 2021: >90 ML/MIN/1.73M*2
EOSINOPHIL # BLD AUTO: 0 X10*3/UL (ref 0–0.4)
EOSINOPHIL NFR BLD AUTO: 0 %
ERYTHROCYTE [DISTWIDTH] IN BLOOD BY AUTOMATED COUNT: 14 % (ref 11.5–14.5)
GLUCOSE BLD MANUAL STRIP-MCNC: 103 MG/DL (ref 74–99)
GLUCOSE BLD MANUAL STRIP-MCNC: 113 MG/DL (ref 74–99)
GLUCOSE BLD MANUAL STRIP-MCNC: 133 MG/DL (ref 74–99)
GLUCOSE BLD MANUAL STRIP-MCNC: 154 MG/DL (ref 74–99)
GLUCOSE SERPL-MCNC: 118 MG/DL (ref 74–99)
HCT VFR BLD AUTO: 29.4 % (ref 36–46)
HGB BLD-MCNC: 9.5 G/DL (ref 12–16)
IMM GRANULOCYTES # BLD AUTO: 0.04 X10*3/UL (ref 0–0.5)
IMM GRANULOCYTES NFR BLD AUTO: 0.6 % (ref 0–0.9)
LYMPHOCYTES # BLD AUTO: 0.47 X10*3/UL (ref 0.8–3)
LYMPHOCYTES NFR BLD AUTO: 6.9 %
MAGNESIUM SERPL-MCNC: 2.01 MG/DL (ref 1.6–2.4)
MCH RBC QN AUTO: 29.5 PG (ref 26–34)
MCHC RBC AUTO-ENTMCNC: 32.3 G/DL (ref 32–36)
MCV RBC AUTO: 91 FL (ref 80–100)
MONOCYTES # BLD AUTO: 0.28 X10*3/UL (ref 0.05–0.8)
MONOCYTES NFR BLD AUTO: 4.1 %
NEUTROPHILS # BLD AUTO: 6.04 X10*3/UL (ref 1.6–5.5)
NEUTROPHILS NFR BLD AUTO: 88.3 %
NRBC BLD-RTO: 0 /100 WBCS (ref 0–0)
PHOSPHATE SERPL-MCNC: 4.5 MG/DL (ref 2.5–4.9)
PLATELET # BLD AUTO: 201 X10*3/UL (ref 150–450)
POTASSIUM SERPL-SCNC: 3.6 MMOL/L (ref 3.5–5.3)
RBC # BLD AUTO: 3.22 X10*6/UL (ref 4–5.2)
SODIUM SERPL-SCNC: 141 MMOL/L (ref 136–145)
WBC # BLD AUTO: 6.8 X10*3/UL (ref 4.4–11.3)

## 2024-11-19 PROCEDURE — 2500000004 HC RX 250 GENERAL PHARMACY W/ HCPCS (ALT 636 FOR OP/ED): Performed by: INTERNAL MEDICINE

## 2024-11-19 PROCEDURE — 99233 SBSQ HOSP IP/OBS HIGH 50: CPT

## 2024-11-19 PROCEDURE — 37799 UNLISTED PX VASCULAR SURGERY: CPT

## 2024-11-19 PROCEDURE — 97535 SELF CARE MNGMENT TRAINING: CPT | Mod: GO

## 2024-11-19 PROCEDURE — 82947 ASSAY GLUCOSE BLOOD QUANT: CPT

## 2024-11-19 PROCEDURE — 80069 RENAL FUNCTION PANEL: CPT | Performed by: STUDENT IN AN ORGANIZED HEALTH CARE EDUCATION/TRAINING PROGRAM

## 2024-11-19 PROCEDURE — 83735 ASSAY OF MAGNESIUM: CPT | Performed by: STUDENT IN AN ORGANIZED HEALTH CARE EDUCATION/TRAINING PROGRAM

## 2024-11-19 PROCEDURE — 2060000001 HC INTERMEDIATE ICU ROOM DAILY

## 2024-11-19 PROCEDURE — 2500000004 HC RX 250 GENERAL PHARMACY W/ HCPCS (ALT 636 FOR OP/ED): Performed by: STUDENT IN AN ORGANIZED HEALTH CARE EDUCATION/TRAINING PROGRAM

## 2024-11-19 PROCEDURE — 85025 COMPLETE CBC W/AUTO DIFF WBC: CPT | Performed by: STUDENT IN AN ORGANIZED HEALTH CARE EDUCATION/TRAINING PROGRAM

## 2024-11-19 PROCEDURE — 2500000004 HC RX 250 GENERAL PHARMACY W/ HCPCS (ALT 636 FOR OP/ED)

## 2024-11-19 PROCEDURE — 97530 THERAPEUTIC ACTIVITIES: CPT | Mod: GP

## 2024-11-19 PROCEDURE — 2500000001 HC RX 250 WO HCPCS SELF ADMINISTERED DRUGS (ALT 637 FOR MEDICARE OP): Performed by: STUDENT IN AN ORGANIZED HEALTH CARE EDUCATION/TRAINING PROGRAM

## 2024-11-19 PROCEDURE — 92526 ORAL FUNCTION THERAPY: CPT | Mod: GN

## 2024-11-19 PROCEDURE — 2500000005 HC RX 250 GENERAL PHARMACY W/O HCPCS: Performed by: STUDENT IN AN ORGANIZED HEALTH CARE EDUCATION/TRAINING PROGRAM

## 2024-11-19 PROCEDURE — 82330 ASSAY OF CALCIUM: CPT

## 2024-11-19 PROCEDURE — 92507 TX SP LANG VOICE COMM INDIV: CPT | Mod: GN

## 2024-11-19 PROCEDURE — 94640 AIRWAY INHALATION TREATMENT: CPT

## 2024-11-19 PROCEDURE — 97530 THERAPEUTIC ACTIVITIES: CPT | Mod: GO

## 2024-11-19 RX ORDER — SODIUM CHLORIDE 9 MG/ML
50 INJECTION, SOLUTION INTRAVENOUS CONTINUOUS
Status: ACTIVE | OUTPATIENT
Start: 2024-11-19 | End: 2024-11-19

## 2024-11-19 RX ORDER — POTASSIUM CHLORIDE 14.9 MG/ML
20 INJECTION INTRAVENOUS
Status: COMPLETED | OUTPATIENT
Start: 2024-11-19 | End: 2024-11-19

## 2024-11-19 RX ADMIN — TIOTROPIUM BROMIDE INHALATION SPRAY 2 PUFF: 3.12 SPRAY, METERED RESPIRATORY (INHALATION) at 08:33

## 2024-11-19 RX ADMIN — POTASSIUM CHLORIDE 20 MEQ: 14.9 INJECTION, SOLUTION INTRAVENOUS at 06:37

## 2024-11-19 RX ADMIN — BUSPIRONE HYDROCHLORIDE 10 MG: 10 TABLET ORAL at 20:19

## 2024-11-19 RX ADMIN — FLUTICASONE FUROATE AND VILANTEROL TRIFENATATE 1 PUFF: 100; 25 POWDER RESPIRATORY (INHALATION) at 08:33

## 2024-11-19 RX ADMIN — HEPARIN SODIUM 5000 UNITS: 5000 INJECTION INTRAVENOUS; SUBCUTANEOUS at 17:22

## 2024-11-19 RX ADMIN — BUSPIRONE HYDROCHLORIDE 10 MG: 10 TABLET ORAL at 09:05

## 2024-11-19 RX ADMIN — SENNOSIDES 17.2 MG: 8.6 TABLET, FILM COATED ORAL at 09:05

## 2024-11-19 RX ADMIN — LEVOTHYROXINE SODIUM 25 MCG: 25 TABLET ORAL at 09:05

## 2024-11-19 RX ADMIN — ATORVASTATIN CALCIUM 80 MG: 80 TABLET, FILM COATED ORAL at 20:19

## 2024-11-19 RX ADMIN — ASPIRIN 81 MG CHEWABLE TABLET 81 MG: 81 TABLET CHEWABLE at 20:19

## 2024-11-19 RX ADMIN — LIDOCAINE 1 PATCH: 4 PATCH TOPICAL at 09:05

## 2024-11-19 RX ADMIN — POLYETHYLENE GLYCOL 3350 17 G: 17 POWDER, FOR SOLUTION ORAL at 09:05

## 2024-11-19 RX ADMIN — MELATONIN 3 MG: 3 TAB ORAL at 20:19

## 2024-11-19 RX ADMIN — LABETALOL HYDROCHLORIDE 10 MG: 5 INJECTION, SOLUTION INTRAVENOUS at 05:22

## 2024-11-19 RX ADMIN — SENNOSIDES 17.2 MG: 8.6 TABLET, FILM COATED ORAL at 20:19

## 2024-11-19 RX ADMIN — POTASSIUM CHLORIDE 20 MEQ: 14.9 INJECTION, SOLUTION INTRAVENOUS at 04:31

## 2024-11-19 RX ADMIN — BUSPIRONE HYDROCHLORIDE 10 MG: 10 TABLET ORAL at 17:22

## 2024-11-19 RX ADMIN — PANTOPRAZOLE SODIUM 40 MG: 40 INJECTION, POWDER, FOR SOLUTION INTRAVENOUS at 06:37

## 2024-11-19 RX ADMIN — SODIUM CHLORIDE 50 ML/HR: 9 INJECTION, SOLUTION INTRAVENOUS at 01:08

## 2024-11-19 RX ADMIN — Medication 2 L/MIN: at 20:00

## 2024-11-19 ASSESSMENT — PAIN - FUNCTIONAL ASSESSMENT
PAIN_FUNCTIONAL_ASSESSMENT: 0-10

## 2024-11-19 ASSESSMENT — COGNITIVE AND FUNCTIONAL STATUS - GENERAL
TOILETING: A LOT
HELP NEEDED FOR BATHING: A LOT
DRESSING REGULAR LOWER BODY CLOTHING: A LOT
MOBILITY SCORE: 8
DRESSING REGULAR UPPER BODY CLOTHING: A LOT
EATING MEALS: A LOT
PERSONAL GROOMING: A LOT
MOVING FROM LYING ON BACK TO SITTING ON SIDE OF FLAT BED WITH BEDRAILS: A LOT
WALKING IN HOSPITAL ROOM: TOTAL
MOVING TO AND FROM BED TO CHAIR: TOTAL
CLIMB 3 TO 5 STEPS WITH RAILING: TOTAL
STANDING UP FROM CHAIR USING ARMS: TOTAL
DAILY ACTIVITIY SCORE: 12
TURNING FROM BACK TO SIDE WHILE IN FLAT BAD: A LOT

## 2024-11-19 ASSESSMENT — ACTIVITIES OF DAILY LIVING (ADL): HOME_MANAGEMENT_TIME_ENTRY: 26

## 2024-11-19 ASSESSMENT — PAIN SCALES - GENERAL
PAINLEVEL_OUTOF10: 0 - NO PAIN
PAINLEVEL_OUTOF10: 2

## 2024-11-19 NOTE — PROGRESS NOTES
"Zulma Rao is a 79 y.o. female on day 8 of admission presenting with Cerebrovascular accident (CVA) due to occlusion of left middle cerebral artery (Multi).    Subjective   No events overnight    Objective     Physical Exam  Awake, Ox3  RUE prox 0 dist 4+  RLE prox 3 dist 3   LUE 5/5  LLE 5/5  2L NC  SILT    Last Recorded Vitals  Blood pressure 140/74, pulse 97, temperature 36.3 °C (97.3 °F), temperature source Temporal, resp. rate 14, height 1.626 m (5' 4\"), weight 50.9 kg (112 lb 3.4 oz), SpO2 96%.  Intake/Output last 3 Shifts:  I/O last 3 completed shifts:  In: 309 (6.1 mL/kg) [P.O.:175; I.V.:121.5 (2.4 mL/kg); IV Piggyback:12.5]  Out: 2395 (47.1 mL/kg) [Urine:2395 (1.3 mL/kg/hr)]  Weight: 50.9 kg     Relevant Results  Lab Results   Component Value Date    WBC 6.8 11/19/2024    HGB 9.5 (L) 11/19/2024    HCT 29.4 (L) 11/19/2024    MCV 91 11/19/2024     11/19/2024       Assessment/Plan   Principal Problem:    Cerebrovascular accident (CVA) due to occlusion of left middle cerebral artery (Multi)    Zulma Rao is an 79 year old with h/o HFpEF, NSTEMI, T2DM, HTN, HLD, anxiety, hypothyroidism, COPD, previous R sided stroke (7/2024), b/l cataracts, bilateral carotid stenosis s/p R CEA (2011), HCC s/p partial hepatectomy (2019), p/w CVA 2/2 L M1 occlusion, s/p TNK, s/p MT w TICI 3, MRI L watershed infarcts, small infarcts involving left frontal, parietal, and temporal lobes, CTA 74% L carotid artery occlusion proximal to bifurcation, moderate R V2 narrowing, R carotid occlusion at origin, CTA stable    MRI brain stable evolving infarcts, interval increase watershed infarcts    11/18 s/p L CEA    Stroke primary  Drain - will discuss dc  CUS in 6 wks  continue ASA, PLX POD 7  SBP goal 140-160  Ok for SQH    Mark Anthony Woods MD     "

## 2024-11-19 NOTE — PROGRESS NOTES
Speech-Language Pathology    SLP Adult Inpatient  Speech-Language Pathology Treatment     Patient Name: Zulma Rao  MRN: [id number]  Today's Date: 11/19/2024  Time Calculation  Start Time: 1010  Stop Time: 1033  Time Calculation (min): 23 min       Assessment:  -Mild/moderate oral dysphagia and pharyngeal swallow largely WFL per MBSS 11/14- improving.     -Expressive and receptive language largely intact; mild/moderate cognitive deficits- stable.         Recommendations:  Solid Diet Recommendations: Minced & Moist   Liquid Diet Recommendations: Thin   Medications: Crushed in puree as cleared by medical team      Safe Swallow/Compensatory Strategies:  Upright positioning   Slow rate/small boluses   Alternate solids and liquids        Plan:  SLP Plan: Skilled SLP  SLP Frequency: 2x per week  Duration: 2-3 weeks  Discussed POC: Pt, medical team      Goals:  Patient will tolerate baseline/recommended PO diet without overt s/s of aspiration, further difficulty, or concern for aspiration-related complications in 90% of observed therapeutic trials.               Date initiated: 11/14/24              Expected Time Frame to Meet Goal: 2-3 weeks               Status: Progressing      Patient will participate in solid upgrade trials with adequate mastication/oral phase given no residue following swallow.               Date initiated: 11/14/24              Expected Time Frame to Meet Goal: 2-3 weeks               Status: Progressing      Patient/family will indicate understanding of dysphagia education: risk for aspiration, recommendations, and POC with > 80% accuracy via teach back method.               Date initiated: 11/14/24              Expected Time Frame to Meet Goal: 2-3 weeks               Status: Progressing      Pt will follow 1-step commands with > 80% accuracy with min cues.                Date initiated: 11/12/24              Expected Time Frame to Meet Goal: 2-3 weeks               Status: Goal Met      Pt  will respond to Y/N questions with > 80% accuracy given min cues.                Date initiated: 11/12/24              Expected Time Frame to Meet Goal: 2-3 weeks               Status: Goal Met      Pt will identify an object, its color, and its function with > 80% accuracy given min cues.                Date initiated: 11/12/24              Expected Time Frame to Meet Goal: 2-3 weeks               Status: Goal Met      Pt will express wants and needs to staff and family via total communication.              Date initiated: 11/12/24              Expected Time Frame to Meet Goal: 2-3 weeks               Status: Goal Met     Pt will participate in further cognitive-linguistic assessment.              Date initiated: 11/13/24              Expected Time Frame to Meet Goal: 1-2 sessions      Pt will respond to functional reasoning and problem solving questions with > 90% accuracy given min cues.              Date initiated: 11/15/24              Expected Time Frame to Meet Goal: 2-3 weeks              Status: Progressing         Subjective   RN cleared pt for tx.  Pt properly identified and treated bedside.  Pt awake and alert, with no c/o pain.  O2 via NC.  No family present.       Pt admitted w/ an acute L MCA CVA; s/p TNK and MT.  S/p L CEA on 11/18/24.      MBSS 11/14: Mild/moderate oral dysphagia. Impairments most impacting swallowing efficiency include impaired bolus formation/transit, piecemeal swallow with solids and liquids.  Pharyngeal swallow largely intact.          Objective   Pt participated in a therapeutic trial feed to assess for diet tolerance/upgrade and use of safe swallow strategies.  Pt requires partial assist w/ PO intake.  Piecemeal swallowing with liquid boluses persists, but no overt s/s of aspiration.  Improved mastication and bolus management with trials of soft & bite sized solids.  No oral residue noted.  No s/s or c/o pharyngeal dysphagia.  May be ready for upgrade to soft & bite sized  solids next visit.  Continue w/ minced & moist solids w/ thin liquids at this time.  Education provided to patient regarding diet recommendations, s/s of aspiration, safe swallow strategies, and plan of care.  Understanding indicated.          Expressive/receptive language informally judged to be WFL during session.  Pt independently oriented x4.  Responded to functional problem solving questions with accuracy in 4/4 trials, and functional reasoning questions with accuracy in 6/6 trials.  Pt named an average of 2 objects in 60 seconds during divergent naming tasks.  Pt performs best in structured tasks; occasional confusion in conversation.  RN reports c/f hospital acquire delirium.  No needs voiced at end of session.  Call bell within reach.        11/19/24 at 1:42 PM - Nithya Trotter, SLP

## 2024-11-19 NOTE — PROGRESS NOTES
Physical Therapy    Physical Therapy Treatment    Patient Name: Zulma Rao  MRN: [id number]  Today's Date: 11/19/2024  Time Calculation  Start Time: 0946  Stop Time: 1004  Time Calculation (min): 18 min       Assessment/Plan   PT Assessment  Rehab Prognosis: Fair  Evaluation/Treatment Tolerance: Patient limited by fatigue  End of Session Communication: Bedside nurse  Assessment Comment: Pt to benefit from ongoing PT services to address the above limitations and to promote timely return to prior level of function.  End of Session Patient Position: Bed, 3 rail up, Alarm on     PT Plan  Treatment/Interventions: Bed mobility, Transfer training, Gait training, Balance training, Neuromuscular re-education, Strengthening, Endurance training, Range of motion, Therapeutic exercise, Therapeutic activity, Home exercise program, Positioning, Postural re-education  PT Plan: Ongoing PT  PT Frequency: 5 times per week  PT Discharge Recommendations: Moderate intensity level of continued care  PT Recommended Transfer Status: Total assist  PT - OK to Discharge: Yes (When medically ready)      General Visit Information:   PT  Visit  PT Received On: 11/19/24  Response to Previous Treatment: Patient with no complaints from previous session.  Reason for Referral: Admitted 11/11/2024 with Cerebrovascular accident (CVA) due to occlusion of left middle cerebral artery (Multi) after transferred for mechanical thrombectomy. 11/11 TNK and spontaneous recanalization on angiogram TICI 3. 11/18 s/p L CEA  Past Medical History Relevant to Rehab: prior R sided stroke with left sided deficit (per personal review of imaging, L sided posterior limb of internal capsule) 7/2024, bilateral carotid artery stenosis S/P R CEA 2011, HFpEF, NSTEMI, T2DM, HT, DLD, hypothyroidism, COPD, HCC S/P partial hepatectomy (2019), anxiety, b/l cataracts  Prior to Session Communication: Bedside nurse  Patient Position Received: Bed, 3 rail up, Alarm off, not on at  start of session  Family/Caregiver Present: No  Caregiver Feedback: n/a  General Comment: Pt pleasant and cooperative.     Subjective   Precautions:  Precautions  Hearing/Visual Limitations: WFL  Medical Precautions: Fall precautions  Precautions Comment: SBP <160 per RN    Vital Signs:  Vital Signs (Past 2hrs)        Date/Time Vitals Session Patient Position Pulse Resp SpO2 BP MAP (mmHg)    11/19/24 0900 --  --  78  13  97 %  139/80  98     11/19/24 0946 Pre PT  Lying  80  --  97 %  157/87  103     11/19/24 0956 During PT  Sitting  83  --  96 %  173/87  102     11/19/24 1000 During PT  Lying  85  17  98 %  163/76  103     11/19/24 1004 Post PT  Lying  91  --  98 %  170/74  102                     Objective   Pain:  Pain Assessment  Pain Assessment:  (Unrated pain at L CEA site. Pt groans in response to any passive movement of BLE.)  Cognition:  Cognition  Overall Cognitive Status: Impaired  Arousal/Alertness: Delayed responses to stimuli  Orientation Level: Oriented X4  Following Commands: Follows one step commands with repetition (75% accuracy)  Cognition Comments: Conversation limited mainly to one word responses.  Insight: Mild  Impulsive: Within functional limits  Processing Speed: Delayed    Lines/Tubes/Drains:  Arterial Line 11/18/24 Left Brachial (Active)   Number of days: 1       PICC - Adult 11/12/24 Double lumen Right Basilic vein (Active)   Number of days: 6       Urethral Catheter (Active)   Number of days: 7       Closed/Suction Drain Left;Anterior Neck Bulb (Active)   Number of days: 1       Continuous Medications/Drips:  sodium chloride 0.9%, 50 mL/hr, Last Rate: 50 mL/hr (11/19/24 0400)        Oxygen: 3 L/min    Balance:   Static Sitting Balance  Static Sitting-Balance Support: Bilateral upper extremity supported, Feet supported  Static Sitting-Level of Assistance: Minimum assistance (x1)  Dynamic Sitting Balance  Dynamic Sitting-Balance Support: Bilateral upper extremity supported, Feet  unsupported  Dynamic Sitting-Level of Assistance: Moderate assistance (x1)  Dynamic Sitting-Comments: Scooting to edge of bed    Static Standing Balance  Static Standing-Comment/Number of Minutes: Deferred  Dynamic Standing Balance  Dynamic Standing-Comments: Deferred    PT Treatments:       Therapeutic Activity  Therapeutic Activity Performed: Yes  Therapeutic Activity 1: Positioned pt in upright position, supported with pillows and wedges at end of session to optimize cardiopulmonary function.  Therapeutic Activity 2: Sitting at edge of bed: Verbal/tactile cues for proper UE support and active breathing, provided stool for BLE support to promote proper posture and to increase tolerance of upright position. Skilled vitals monitoring provided throughout this activity (~8 min at EOB.)         Bed Mobility  Bed Mobility: Yes  Bed Mobility 1  Bed Mobility 1: Supine to sitting, Sitting to supine  Level of Assistance 1: Maximum assistance (x1)  Bed Mobility Comments 1: Cues for proper hand placement  Bed Mobility 2  Bed Mobility  2: Scooting (boosting)  Level of Assistance 2: Dependent, +2  Bed Mobility Comments 2: HOB flat, used draw sheet    Ambulation/Gait Training  Ambulation/Gait Training Performed: No    Transfers  Transfer: No    Stairs  Stairs: No              Outcome Measures:  [facility] Basic Mobility  Turning from your back to your side while in a flat bed without using bedrails: A lot  Moving from lying on your back to sitting on the side of a flat bed without using bedrails: A lot  Moving to and from bed to chair (including a wheelchair): Total  Standing up from a chair using your arms (e.g. wheelchair or bedside chair): Total  To walk in hospital room: Total  Climbing 3-5 steps with railing: Total  Basic Mobility - Total Score: 8              Confusion Assessment Method-ICU (CAM-ICU)  Feature 1: Acute Onset or Fluctuating Course: Positive  Feature 2: Inattention: Negative  Feature 4: Disorganized Thinking:  Negative  Overall CAM-ICU: Negative    FSS-ICU  Ambulation: Unable to attempt due to weakness  Rolling: Moderate assistance (performs 50 - 74% of task)  Sitting: Minimal assistance (performs 75% or more of task)  Transfer Sit-to-Stand: Unable to perform  Transfer Supine-to-Sit: Maximal assistance (performs 25% - 49% of task)  Total Score: 9    ICU Mobility Screen  Early Mobility/Exercise Safety Screen: Proceed with mobilization - No exclusion criteria met  ICU Mobility Scale: Sitting over edge of bed                   Education:  Education Documentation  Body Mechanics, taught by Nereida Sanchez, PT at 11/19/2024 10:20 AM.  Learner: Patient  Readiness: Acceptance  Method: Explanation  Response: Verbalizes Understanding, Needs Reinforcement    Mobility Training, taught by Nereida Sanchez, PT at 11/19/2024 10:20 AM.  Learner: Patient  Readiness: Acceptance  Method: Explanation  Response: Verbalizes Understanding, Needs Reinforcement    Education Comments  No comments found.             Encounter Problems       Encounter Problems (Active)       PT Problem       Patient is able to perform sit to stand requiring contact guard assist or less  (Not met)       Start:  11/13/24    Expected End:  11/27/24    Resolved:  11/19/24    Updated to: Patient will perform sit to stand and stand to sit transfers with </= MOD A x1 and LRD to increase functional strength.    Update reason: Updating plan of care         Patient is able to ambulate 150 feet without loss of balance requiring contact guard or less assist  (Not met)       Start:  11/13/24    Expected End:  11/27/24    Resolved:  11/19/24    Updated to: Patient will ambulate at least 10 ft. with </= MAX A x1 and LRD with VSS to improve tolerance of household distances.    Update reason: Updating plan of care         Patient is able to perform supine to sit transfer requiring contact guard assist or less (Not met)       Start:  11/13/24    Expected End:  11/27/24     Resolved:  11/19/24    Updated to: Patient will perform bed mobility with </= MIN A x1 to reduce risk of developing decubitus ulcers.    Update reason: Updating plan of care         Patient will perform sit to stand and stand to sit transfers with </= MOD A x1 and LRD to increase functional strength.       Start:  11/19/24    Expected End:  11/27/24                Patient will ambulate at least 10 ft. with </= MAX A x1 and LRD with VSS to improve tolerance of household distances.       Start:  11/19/24    Expected End:  11/27/24                Patient will perform bed mobility with </= MIN A x1 to reduce risk of developing decubitus ulcers.       Start:  11/19/24    Expected End:  11/27/24                Patient will perform home exercise program as prescribed with cues as needed.         Start:  11/19/24    Expected End:  11/27/24                Patient will score at least 42/56 on the Function in Sitting Test to improve sitting balance required for functional tasks.         Start:  11/19/24    Expected End:  11/27/24                   Pain - Adult                11/19/24 at 10:22 AM   Nereida Sanchez, PT   Rehab Office: [phone]

## 2024-11-19 NOTE — SIGNIFICANT EVENT
Patient with left sided watershed infarcts, who underwent a left carotid endarterectomy. Patient is doing well, postoperative drain was removed today. No additional acute neurosurgical intervention or additional neuroimaging needed at this time. Care per primary team ([facility]/stroke). Okay for DAPT post-op day 7. Patient needs follow up for wound check at 2 weeks and at 6 weeks with carotid ultrasound, all will be scheduled by neurosurgery. Thank you for allowing us to participate in the care of this patient. Will sign off at this time. Please page [phone] with any questions or concerns.    Farhan Melendez MD  PGY-1 Neurosurgery  8:31 AM

## 2024-11-19 NOTE — PROGRESS NOTES
"Zulma Rao is a 79 y.o. female on day 8 of admission presenting with Cerebrovascular accident (CVA) due to occlusion of left middle cerebral artery (Multi).    Subjective   No acute events overnight. No new concerns. Breathing comfortable on 2L NC. Patient has been weaned off of vasopressor therapy, with last known dose of norepinephrine 11/18 AM. Neurosurgery in this morning to remove L neck surgical site drain. Nursing staff report some difficulty maintaining current SBP goal 140-160 with PRN IV labetalol 10 mg & hydralazine 10 mg, due to SBP decrease to ~100s while sleeping, and increase upon waking/interaction with NSU staff up to ~200 maximum per A-line pressures, although they report stable neuro exams overnight.    This morning, endorsing R hip pain related to bed positioning - resolved with repositioning. She denied any new headache, visual changes, nausea, vomiting.        Objective   Last Recorded Vitals  Blood pressure 147/69, pulse 78, temperature 36.2 °C (97.2 °F), resp. rate 13, height 1.626 m (5' 4\"), weight 50.9 kg (112 lb 3.4 oz), SpO2 94%.        11/19/2024     2:00 AM 11/19/2024     3:00 AM 11/19/2024     4:00 AM 11/19/2024     5:00 AM 11/19/2024     6:00 AM 11/19/2024     7:00 AM 11/19/2024     8:00 AM   Vitals   Systolic 140 108 77 115 116 147    Diastolic 74 65 51 57 56 69    Heart Rate 97 88 79 78 72 78    Temp    36.2 °C (97.2 °F)   36.2 °C (97.2 °F)   Resp 14 15 14 14 10 13        Physical Exam  HENT:      Head: Normocephalic and atraumatic.   Eyes:      General: Lids are normal.      Extraocular Movements: Extraocular movements intact.      Conjunctiva/sclera: Conjunctivae normal.      Pupils: Pupils are equal, round, and reactive to light.   Cardiovascular:      Rate and Rhythm: Normal rate and regular rhythm.   Pulmonary:      Effort: Pulmonary effort is normal.      Breath sounds: Normal breath sounds.   Neurological:      Mental Status: She is alert.   Psychiatric:         Speech: " Speech normal.     Neurological Exam  Mental Status  Alert. Oriented to person, place, time and situation. Speech is normal. Speech: No dysarthria. Able to name objects, repeat and read.    Cranial Nerves  CN II: Visual fields full to confrontation.  CN III, IV, VI: Extraocular movements intact bilaterally. Normal lids and orbits bilaterally. Pupils equal round and reactive to light bilaterally.  CN V:  Right: Facial sensation is normal.  Left: Facial sensation is normal on the left.  CN VII: Full and symmetric facial movement.  CN VIII: Hearing grossly intact to conversation bilaterally..  CN IX, X: Palate elevates symmetrically  CN XI:  Right: Sternocleidomastoid strength is normal. 1/5 Trapezius strength to shoulder shrug.  Left: Sternocleidomastoid strength is normal. Trapezius strength is normal.  CN XII: Tongue midline without atrophy or fasciculations.    Motor  Normal muscle bulk throughout. No fasciculations present. Increased muscle tone. Tone is increased in the RU . No abnormal involuntary movements. Right hemiparesis.  Right upper is antigravity 3/5   Right lower 4+/5   Left side both upper and lower is 5/5 .    Sensory  Sensation to light touch intact and symmetric in all 4 distal and proximal extremities. .    Coordination  Right: Finger-to-nose normal.Left: Unable to assess due to RUE proximal weakness.    Relevant Results  NIHSS    1a  Level of consciousness: 0=alert; keenly responsive   1b. LOC questions:  0=Performs both tasks correctly   1c. LOC commands: 0=Performs both tasks correctly   2.  Best Gaze: 0=normal   3. Visual: 0=No visual loss   4. Facial Palsy: 0=Normal symmetric movement   5a. Motor Left Arm: 0=No drift, limb holds 90 (or 45) degrees for full 10 seconds   5b.  Motor Right Arm: 2=Some effort against gravity, limb cannot get to or maintain (if cured) 90 (or 45) degrees, drifts down to bed, but has some effort against gravity   6a. Motor Left Le=No drift, limb holds 90 (or 45)  degrees for full 10 seconds   6b  Motor Right Le=Drift, limb holds 90 (or 45) degrees but drifts down before full 10 seconds: does not hit bed   7. Limb Ataxia: 0=Absent   8.  Sensory: 0=Normal; no sensory loss   9. Best Language:  0=No aphasia, normal   10. Dysarthria: 0=Normal   11. Extinction and Inattention: 0=No abnormality          Total:   3     Scheduled medications  aspirin, 81 mg, oral, Daily  atorvastatin, 80 mg, oral, Nightly  azithromycin, 250 mg, oral, Once per day on   busPIRone, 10 mg, oral, TID  [Held by provider] clopidogrel, 75 mg, oral, Daily  pantoprazole, 40 mg, oral, Daily before breakfast   Or  esomeprazole, 40 mg, nasoduodenal tube, Daily before breakfast   Or  pantoprazole, 40 mg, intravenous, Daily before breakfast  tiotropium, 2 puff, inhalation, Daily   And  fluticasone furoate-vilanteroL, 1 puff, inhalation, Daily  [Held by provider] heparin (porcine), 5,000 Units, subcutaneous, q8h  insulin lispro, 0-5 Units, subcutaneous, TID AC  levothyroxine, 25 mcg, oral, Daily  lidocaine, 1 patch, transdermal, Daily  melatonin, 3 mg, oral, Nightly  [Held by provider] midodrine, 5 mg, oral, TID  polyethylene glycol, 17 g, oral, Daily  potassium chloride, 20 mEq, intravenous, q2h  sennosides, 2 tablet, oral, BID      Continuous medications  sodium chloride 0.9%, 50 mL/hr, Last Rate: 50 mL/hr (24 0400)      PRN medications  PRN medications: acetaminophen, alteplase, calcium gluconate, calcium gluconate, dextrose, dextrose, glucagon, glucagon, hydrALAZINE, HYDROmorphone, ipratropium-albuteroL, labetaloL, magnesium sulfate, magnesium sulfate, ondansetron ODT **OR** ondansetron, oxygen, potassium chloride CR **OR** potassium chloride, potassium chloride CR **OR** potassium chloride, potassium chloride    Results from last 72 hours   Lab Units 24  0007 24  0226 24  0215   SODIUM mmol/L 141 141 142   POTASSIUM mmol/L 3.6 3.7 3.9   BUN mg/dL 11 6 7    CREATININE mg/dL 0.39* 0.44* 0.41*   CALCIUM mg/dL 8.9 9.4 8.6   MAGNESIUM mg/dL 2.01 2.01 2.00      Results from last 72 hours   Lab Units 11/19/24  0007 11/18/24  0226 11/17/24  0215   WBC AUTO x10*3/uL 6.8 9.3 9.3   HEMOGLOBIN g/dL 9.5* 11.4* 10.6*   HEMATOCRIT % 29.4* 33.0* 31.1*   PLATELETS AUTO x10*3/uL 201 221 218            Results from last 72 hours   Lab Units 11/17/24  0901   INR  1.1        IMAGING:     MRI 11/14/2024  FINDINGS:  CSF Spaces: Diffuse prominence of the ventricles and sulci is noted.  The basal cisterns are clear. Appearance is unchanged since the  recent MRI brain study.      Parenchyma: Compared to MRI 11/13/2024, interval increase in size of  restricted diffusion with associated T2/FLAIR hyperintensity  involving the left cerebral hemispheric white matter primarily within  the white matter. Additional foci of restricted diffusion with  associated T2/FLAIR hyperintensity are noted within the bilateral  frontal lobes including the precentral gyri, left parietal lobe, left  temporal lobe, and left insula as well as the left mesial temporal  lobe. No striking hemorrhagic transformation or striking mass effect.      Few new small foci of restricted diffusion are seen, including within  the left parietal lobe gray-white matter junction (series 3 image 50  of 76) and a small focus (Series 3, Image 53) of restricted diffusion  located within the left parietal lobe gray matter (series 3, image 53  of 76) was faintly seen on the prior MRI.      Encephalomalacia/gliosis is again noted in the posterior right  occipital lobe. Chronic right thalamic infarct is again seen.  Additional patchy areas of T2/FLAIR hyperintensity are noted within  the deep, periventricular, and subcortical white matter bilaterally,  likely related to chronic small vessel ischemic changes. Small old  infarct in the right cerebellum is unchanged. There is no mass effect  or midline shift.      Loss of flow void located  within distal right cervical ICA and  portions of the right intracranial ICA consistent with occlusion and  better evaluated on the prior CTA head study.      Paranasal Sinuses and Mastoids: Visualized paranasal sinuses and  mastoid air cells are unremarkable.      IMPRESSION:  1. Evolving infarcts located within the left cerebral hemisphere,  including in the watershed distribution, overall more pronounced in  appearance but similar in extent with no hemorrhagic transformation  or surrounding mass effect.      2. Additional evolving acute infarcts are seen located within the  bilateral cerebral hemispheres and are stable to slightly more  pronounced in appearance. Few new punctate acute infarcts located  within the left cerebral hemisphere. There is no surrounding mass  effect or hemorrhagic transformation.     IMAGING:  MR brain wo IV contrast  Result Date: 11/13/2024 1:26 am     FINDINGS:   CSF Spaces: The ventricles, sulci and basal cisterns are within normal limits for patient's age.     Parenchyma: Watershed distribution areas of T2/FLAIR hyperintense signal with associated diffusion restriction throughout the left cerebral hemisphere most pronounced in the superior left frontal lobe but also involving the parietal and medial temporal lobes. Finding is consistent with acute/subacute watershed infarction.   Cortical foci of diffusion restriction in the left frontal lobe cortex along the interhemispheric aspect, in the left sylvian fissure and posterior left insula and in the left posterior temporal lobe. These findings are consistent with non watershed distribution   2.5 cm focus of encephalomalacia within the right occipital lobe consistent with previous infarction in the distribution of the right posterior cerebral artery. Focus of subacute/chronic lacunar infarction in the right thalamus measuring a proximally 5 mm x 15 mm in size. Focus of susceptibility artifact within the left basal ganglia favored to  correspond to mineralization seen on prior CT. Nonspecific patchy T2/FLAIR hyperintense signal within the periventricular and subcortical white matter, likely sequela of chronic microangiopathy.   There is no mass effect or midline shift.        Paranasal Sinuses and Mastoids: Visualized paranasal sinuses and mastoid air cells are unremarkable.   Note is made of postsurgical changes from bilateral lens replacements.        Impression  1. Acute/subacute watershed ischemia throughout the left cerebral hemisphere   2. Subcentimeter cortical infarctions involving the left superior frontal lobe but also involving the left parietal and temporal lobes. No evidence of hemorrhagic transformation.   3. Nonspecific white matter changes which can be seen in the setting of chronic microangiopathy.        Transthoracic Echo (TTE) Complete  11/12/2024    CONCLUSIONS:    1. Left ventricular ejection fraction is normal, by visual estimate at 65-70%.    2. Spectral Doppler shows an abnormal pattern of left ventricular diastolic filling.    3. Left ventricular cavity size is decreased.    4. No left ventricular thrombus visualized.    5. There is normal right ventricular global systolic function.    6. The left atrium is severely dilated.    7. Agitated saline contrast study was negative for intracardiac shunt.    8. Normal aortic root.    9. No prior echocardiogram available for comparison.      CT ANGIO AORTA AND BILATERAL ILIOFEMORAL RUN OFF INCLUDING WITHOUT  CONTRAST 11/12/2024  IMPRESSION:  1. Extensive vascular disease including occlusion of the infrarenal  abdominal aorta, bilateral common iliac arteries. Additionally, no  appreciated flow is noted in the left posterior tibial artery  distally. Please refer to the detailed description above.  2. Other nonspecific finding as detailed above.     CT head wo IV contrast; CT angio head and neck w and wo IV contrast  11/12/2024 5:34 am     FINDINGS: NON-CONTRAST HEAD CT:     BRAIN  PARENCHYMA:  No evidence of acute intraparenchymal hemorrhage or parenchymal evidence of an acute large territory ischemic infarct. No mass-effect, midline shift or effacement of cerebral sulci. Gray-white matter distinction is preserved. Punctate calcifications within the left-greater-than-right basal ganglia. Lacunar within the posterior limb of the right internal capsule and left basal ganglia. Small amount of encephalomalacia and gliosis within the right occipital lobe.     VENTRICLES and EXTRA-AXIAL SPACES:  No acute extra-axial or intraventricular hemorrhage. Ventricles and sulci are age-concordant.     PARANASAL SINUSES/MASTOIDS:  No hemorrhage or air-fluid levels within the visualized paranasal sinuses. The mastoids are well aerated.     CALVARIUM/ORBITS:  No skull fracture.  The orbits and globes are intact to the extent visualized.     EXTRACRANIAL SOFT TISSUES: No discernible abnormality.         CTA NECK:   There is calcified and noncalcified plaque along the aortic arch and origins of the great vessels. There is marked narrowing at the origin of the right common carotid artery and right subclavian artery. There is moderate narrowing secondary to noncalcified plaque at the origin of the left subclavian artery.     LEFT VERTEBRAL ARTERY:  No hemodynamically significant stenosis, occlusion, or dissection. The left vertebral artery is dominant.     LEFT COMMON/INTERNAL CAROTID ARTERY:  There is calcified plaque along the course of the common carotid artery with mild-to-moderate narrowing. There is coarse calcification at the carotid bifurcation and along the proximal cervical internal carotid artery with a proximally 65% stenosis by NASCET criteria. The cervical internal carotid artery is patent to the level of the skull base without additional stenosis.     RIGHT VERTEBRAL ARTERY: There is coarse atherosclerotic calcification at the origin of the right vertebral artery. No hemodynamically significant  stenosis, occlusion, or dissection.     RIGHT COMMON/INTERNAL CAROTID ARTERY:  There is occlusion of the right common carotid artery just beyond the origin. There is occlusion of the right cervical internal carotid artery.   Limited images through the lung apices demonstrate emphysematous changes. Mild degenerative changes of the cervical spine without evidence of high-grade spinal canal stenosis. Soft tissues of the neck are unremarkable.            CTA HEAD:     ANTERIOR CIRCULATION: No aneurysm.    - Internal Carotid Arteries: There is occlusion of the right petrous and cavernous internal carotid artery. There is reconstitution within the supraclinoid segment. The left intracranial carotid artery is patent. There is atherosclerotic calcification along the cavernous and supraclinoid ICA with mild narrowing.     - Middle Cerebral Arteries: No hemodynamically significant stenosis or occlusion.     - Anterior Cerebral Arteries: There is and abrupt cutoff of the distal left anterior cerebral artery (series 504, image 82). The right DAKOTA is without hemodynamically significant stenosis or occlusion.       POSTERIOR CIRCULATION: No aneurysm.     - Intracranial Vertebral Arteries:  No hemodynamically significant stenosis or occlusion.     - Basilar Artery:  No hemodynamically significant stenosis or occlusion.     - Posterior Cerebral Arteries:  No hemodynamically significant stenosis or occlusion.        CTA NECK:     Complete occlusion of the right common carotid artery and right cervical internal carotid artery.   Coarse atherosclerotic calcification at the left carotid bifurcation with approximately 65% stenosis by NASCET criteria.   The vertebral arteries are patent. The left vertebral artery is dominant. There is a coarse calcification at the origin of the right vertebral artery.   Calcified and noncalcified plaque along the aortic arch and origins of the great vessels with narrowing.     CTA head:   There is an  abrupt cutoff of the distal left anterior cerebral artery.   Occlusion of the right petrous and cavernous carotid arteries with reconstitution in the supraclinoid segment.   The left intracranial carotid artery is patent with coarse atherosclerotic calcification and mild narrowing.   The posterior circulation is patent without significant stenosis.        CT HEAD:   No acute intracranial hemorrhage or mass effect.     ---     CT head wo IV contrast 11/11/2024 9:03 pm     FINDINGS:   CSF Spaces: Ventricles, cortical sulci and basal cisterns are prominent reflecting age related involutional changes and mild volume loss. Mildly prominent extra-axial CSF space within the bifrontal region may represent asymmetric volume loss. There is no extraaxial fluid collection.     Parenchyma: There is no acute intraparenchymal hemorrhage or parenchymal evidence of acute large territorial ischemic infarction. Patchy white matter hypodensity likely represents microangiopathy. Lucency within the right basal ganglia likely represents a lacunar infarct. Small area of encephalomalacia and gliosis within the right occipital lobe. Of note multiple intracranial vessels are hyperdense, likely secondary to recent IR neuro angiogram. The grey-white differentiation is intact. There is no mass effect or midline shift.     Calvarium: The calvarium is unremarkable.     Paranasal sinuses and mastoids: Visualized paranasal sinuses and mastoids are clear.        No acute intracranial hemorrhage or mass effect.   Small focal area of encephalomalacia and gliosis within the right occipital lobe.   Mild white matter changes compatible with microangiopathy. Lacunar infarct within the right basal ganglia.         Assessment/Plan      Assessment & Plan  Cerebrovascular accident (CVA) due to occlusion of left middle cerebral artery (Multi)    Ms. Zulma Rao is an 78 YO RH white F with history of stroke in 7/2024, HTN, HLD, R CEA 2011, HFpEF, COPD,  anxiety, partial hepatectomy for HCC 2019 transferred from [facility] for acute left MCA infarct with NIH 21, s/p TNK. MT performed, but patient's left had already recanalized. Admitted to NSU for continued monitoring on 11/11 with NIH of 4 with small stroke burden largely in L MCA-DAKOTA watershed.  Noted to have deterioration in neurological examination (NIH 13) when SBP below 170-180 and started on vasopressor therapy. Noted to again deteriorate in terms of R arm strength when off vasopressor therapy with repeat MRI brain on 11/14 demonstrating increased in stroke burden in the L MCA-DAKOTA watershed. Pressor therapy restarted. Patient's  clarified she has been on DAPT since 07/2024 and therefore we conclude that she is essentially failing maximal medical therapy. NSGY consulted for L CEA given 65% stenosis and 100% occlusion of the R ICA on CTA head/neck. L CEA OR on 11/18/24. Lt carotid artery endarterectomy was done on 11/18, the findings were thick calcified plaque. NSGY plans for SBP target is 140-160, now maintained on PRN IV labetalol, hydralazine. Plan to continue to monitor BP lability pending dispo, neuro exams since L CEA have remained stable. Will not plan to re-initiate Plavix due to recent L CEA, although medication re-initiation cleared by NSGY 7 days post-op. Consider anxiety as contributory to BP fluctuations, currently managed with Buspar 10 mg TID.      Type: Ischemic stroke  Subtype/etiology: Artery to artery  Vessels involved: left MCA/DAKOTA watershed  Neurological manifestations: proximal > distal RUE weakness. Improved R sided facial droop, dysarthria, expressive aphasia, proximal RLE weakness.   NIHSS (worst at presentation): 21   Antiplatelet/antithrombotic plan for stroke prevention: Aspirin; clopidogrel (currently HELD prior to 11/18 L CEA)  VTE prophylaxis: SCDs, Heparin 5000 units Q8hr (held prior to 11/18 L CEA)     Vascular Risk Factor modification goals:  Blood pressure goals:  avoid hypotension SBP <100 that could worsen cerebral perfusion, Ischemic stroke post-thrombectomy   Lipid Goals: education on healthy diet and statin therapy to maintain or achieve goal LDL-cholesterol < 70mg  Glucose Goals: early treatment of hyperglycemia to goal glucose 140-180 mg/dl with long-term goal A1c < 7%   Smoking Cessation and Education  Assessment for Rehabilitation needs   Patient and family education on signs and symptoms of stroke, calling 911, healthy strategies for stroke prevention.       Updates 11/19/2024:  - Downgraded from NSU --> ROXANE status  - SBP goal remains 140-160, will maintain with PRN labetalol and hydralazine.   - L radial arterial line removed, ctm with BP cuff  - 11/19 PT re-eval: rec moderate intensity level of continued care  - 11/15 OT eval: rec moderate intensity level of continued care  - NSGY removed post-operative drain, to schedule wound check post-op 2 weeks, carotid ultrasound 6 weeks. Restart subcutaneous heparin today, Plavix at 7 day postop (if desired). NSGY signing off.  - Vascular surgery to schedule outpatient follow up for chronic infrarenal aortic occlusion (next 4-8 weeks)    Plan:  # Acute Left MCA infarct s/p TNK (11/11 @ 1255) and MT (already recanalized - no Tici Score)  # History of HTN, HLD  :: LDL 92; A1c 5.8%  :: NIHSS 21 at time of transfer to [facility] ED --> 3 this morning.     :: MRI brain w/o contrast with L cerebral hemisphere acute-subacute ischemia; subcentimeter cortical infarcts of L superior frontal, parietal, annd temporal lobes. 11/14: Stroke burden increased in size within the same distribution as prior comparison 11/13.  :: CTA with atherosclerotic calcification at the left carotid bifurcation with approximately 65% stenosis by NASCET criteria  :: TTE: Bubble study negative. LVEF 65-70%. No thrombus. Severe LA dilatation.   :: cvEEG with no epileptiform activity.  :: Lt carotid artery endarterectomy was done on 11/18, the findings were thick  calcified plaque. NSGY plans for SBP target is 140-160.   Plan:  - Downgrade from NSU to ROXANE status, Q2hr neuro checks  - Remove L radial arterial line, ctn BP s/p L CEA with BP cuff  - Maintain SBP goal 140-160 per NSGY   - PRN labetalol 10 mg, hydralazine 10 mg   - Continue ASA 81 mg daily  - Plavix cleared for post operative day 7 per NSGY, but do not plan to re-initiate Plavix s/p L CEA.   - DVT PPx: Restart Q8hr subcutaneous heparin, continue SCDs  - Continue atorvastatin 80mg  - Pending PT/OT re-evaluation s/p L CEA recommended moderate intensity level of continued.    # Severe PAD 2/2 Chronic infrarenal aortic occlusion  # HFpEF  # History of HTN, HLD  - ECHO 11/12/24: LVEF 65-70%, LVH, severely dilated LA, negative bubble study  - CT Aorti-ileofemoral w/ runoff: Extensive vascular disease including occlusion of the infrarenal abdominal aorta, bilateral common iliac arteries, no flow appreciated left PT   Plan:  - Continue to monitor on telemetry  - BP goal: 140-160  --> weaned off of midodrine, norepinephrine  - PRN labetalol 10 mg, hydralazine 10 mg  - Vasc Sx signed off, per note: no acute intervention. Good flow to radial artery. Chronic infrarenal aortic occlusion. Continue to monitor doppler signals  - Vasc Sx to schedule outpatient clinic follow-up (next 4-8 weeks).    # COPD  Assessment:  - PFT (10/05/2023 at OSH): FEV1 50% predicted. FEV1/FVC 69%. No TLC obtained. DLCO 40%   Plan:  - Continuous pulse oximetry   - Incentive spirometry while awake  - Continue Trelegy Ellipta (change to inpatient formula)  - Wean O2, maintain SpO2 88-92%    #Chronic anemia:  :: 11/19 CBC Hgb 9.5, Hct 29.4, Plt 201. Baseline Hgb 11.6, Plt: 258  Plan:  - Continue to monitor with daily CBC    #T2DM:  :: A1c 5.8%  Plan:  - Hold home metformin  - Monitor for hypoglycemia:  - Accuchecks, sliding scale insulin AC, HS    #Hypothyroidism:  - Continue home levothyroxine    #Anxiety:  - Continue Buspar 10 mg TID    #JAIME,  resolved  - Baseline BUN/Cr: 12/0.63  - 11/19 BUN/Cr: 11/0.39  Plan:  - Continue to monitor daily RFP  - replete fluids PRN    F: None  E: Replace electrolytes as needed  N: Adule regular diet, minced/moist 5, thin 0  GI: Pantoprazole  DVT Ppx: SQH, SCDs  PIV access    Code status: Patient is full code     José Miguel Flores MS3    I have reviewed and edited the information above and I agree with the assessment and plan as outlined by the medical student.      Christian Phelps MD  Neurology, PGY-2

## 2024-11-19 NOTE — PROGRESS NOTES
Social Work Discharge Planning note:    -Patient discussed during interdisciplinary rounds.   -Team members present: Resident, TCC, and SW  -Plan per medical team: Pt is not medically ready for discharge. Pending BP control, Pt may be ready for discharge by tomorrow.   -Payer: [facility] Medicare.   -Status: Inpatient  -Discharge disposition: Family's FOC, [place], can accept. SW messaged them, requesting that they start insurance precert.   -Anticipated Date of Discharge:  11/20/24    IVIS Linton, LSW

## 2024-11-19 NOTE — PROGRESS NOTES
Occupational Therapy    Occupational Therapy Treatment    Name: Zulma Rao  MRN: [id number]  : 1945  Date: 24  Room:       Time Calculation  Start Time: 1224  Stop Time: 1305  Time Calculation (min): 41 min    Assessment:  Evaluation/Treatment Tolerance: Patient tolerated treatment well  Medical Staff Made Aware: Yes  End of Session Communication: Bedside nurse  End of Session Patient Position: Bed, 3 rail up, Alarm off, not on at start of session  Plan:  Treatment Interventions: ADL retraining, Functional transfer training, UE strengthening/ROM, Endurance training, Cognitive reorientation, Patient/family training, Neuromuscular reeducation, Fine motor coordination activities, Compensatory technique education  OT Frequency: 3 times per week  OT Discharge Recommendations: Moderate intensity level of continued care  Equipment Recommended upon Discharge:  (TBD)  OT - OK to Discharge: Yes    Subjective   General:  OT Last Visit  OT Received On: 24  Reason for Referral: Admitted 2024 with Cerebrovascular accident (CVA) due to occlusion of left middle cerebral artery (Multi) after transferred for mechanical thrombectomy.  TNK and spontaneous recanalization on angiogram TICI 3.  s/p L CEA  Past Medical History Relevant to Rehab: prior R sided stroke with left sided deficit (per personal review of imaging, L sided posterior limb of internal capsule) 2024, bilateral carotid artery stenosis S/P R CEA , HFpEF, NSTEMI, T2DM, HT, DLD, hypothyroidism, COPD, HCC S/P partial hepatectomy (), anxiety, b/l cataracts  Prior to Session Communication: Bedside nurse  Patient Position Received: Bed, 3 rail up, Alarm on  Family/Caregiver Present: No  General Comment: Pt pleasant and agreeable to therapy but expressed frustration   Precautions:  Hearing/Visual Limitations: WFL  Medical Precautions: Fall precautions  Precautions Comment: SBP <160 per RN  Vitals:    Vital Signs      Vitals Session Pre OT During OT Post OT   Heart Rate 93 90s 94   Resp  23  15   SpO2 97 >96% 100   /86 140/73 147/59    93 82   ICP             Lines/Tubes/Drains:  PICC, hernandez, tele, 2L O2 via NC      Cognition:  Overall Cognitive Status: Impaired  Arousal/Alertness: Delayed responses to stimuli  Orientation Level: Oriented X4 (except exact date)  Following Commands:  (Followed >75% simple 1-step commands with repetition and cueing.)  Safety Judgment: Decreased awareness of need for assistance  Problem Solving: Assistance required to identify errors made  Cognition Comments: Pt with flat affect but expressed feeling frustrated during session. Pt highly distractible, required cues for redirection. Perseverated on fixing lines and tray throughout session.  Insight: Mild  Impulsive: Within functional limits  Processing Speed: Delayed    Pain Assessment:  Pain Assessment  Pain Assessment: 0-10  0-10 (Numeric) Pain Score: 0 - No pain     Objective   Activities of Daily Living:   Feeding  Feeding Level of Assistance: Moderate assistance  Feeding Where Assessed: Bed level, Edge of bed  Feeding Comments: Pt required Mod A for self-feeding with R UE with set-up of utensil properly angledin hand and [place] assist with Mod A for technique to gather food onto utensil, but able to bring to mouth with CGA with ~50% spillage. Pt required increased time and fatigued during feeding after 20 min, requiring rest break.Issued red built-up handle. Pt able to use L hand to manipulate spoon for self-feeding with SBA with increased time and 50% spillage. Pt able to grasp cup and drink with L UE. B UE positioned on pillows to improve reaching for tray.                                  Functional Standing Tolerance:   Tolerated 2 static standing trials with Max A x 1.   Bed Mobility/Transfers:   Bed Mobility  Bed Mobility: Yes  Bed Mobility 1  Bed Mobility 1: Supine to sitting, Sitting to supine  Level of Assistance 1: Maximum  assistance  Bed Mobility Comments 1: HOB elevated, cues for sequencing steps for transitional technique, drawsheet utilized    Transfers  Transfer: Yes  Transfer 1  Technique 1: Sit to stand, Stand to sit  Transfer Device 1:  (R UE arm in arm assist and R LE blocking)  Transfer Level of Assistance 1: Maximum assistance (x 1 assist)  Trials/Comments 1: completed 2 trials with therapist positioned in front of pt to block R LE and assist,cues for sequencing, body posture and technique                      Balance:  Dynamic Sitting Balance  Dynamic Sitting-Level of Assistance: Contact guard, Minimum assistance  Static Sitting Balance  Static Sitting-Level of Assistance: Contact guard, Close supervision  Static Standing Balance  Static Standing-Level of Assistance: Maximum assistance    Therapy/Activity:      Therapeutic Activity  Therapeutic Activity Performed: Yes  Therapeutic Activity 1: Pt sat EOB x 23 min with Min A initially but progressed to CGA while completing feeding at EOB.Pt with flexed posture, provided cues for upright, erect posture, pt able to correct posture with cueing. VSS throughout but pt reporting fatigue with sitting EOB.  Therapeutic Activity 2: Pt tolerated 2 static standing trials ~ 15 sec 1st trial, and ~ 5 sec 2nd trial with Max A x 1 with arm in arm assist on R side with R LE blocking.      Outcome Measures:  [facility] Daily Activity  Putting on and taking off regular lower body clothing: A lot  Bathing (including washing, rinsing, drying): A lot  Putting on and taking off regular upper body clothing: A lot  Toileting, which includes using toilet, bedpan or urinal: A lot  Taking care of personal grooming such as brushing teeth: A lot  Eating Meals: A lot  Daily Activity - Total Score: 12  ICU Mobility Screen  Early Mobility/Exercise Safety Screen: Proceed with mobilization - No exclusion criteria met  ICU Mobility Scale: Standing     Education Documentation  Body Mechanics, taught by Marii SHELTON  Toy, OT at 11/19/2024  3:01 PM.  Learner: Patient  Readiness: Acceptance  Method: Explanation, Demonstration  Response: Verbalizes Understanding, Demonstrated Understanding  Comment: educated on feeding tech, safety with mobility, POC.    Precautions, taught by Marii Yeager OT at 11/19/2024  3:01 PM.  Learner: Patient  Readiness: Acceptance  Method: Explanation, Demonstration  Response: Verbalizes Understanding, Demonstrated Understanding  Comment: educated on feeding tech, safety with mobility, POC.    ADL Training, taught by Marii Yeager, OT at 11/19/2024  3:01 PM.  Learner: Patient  Readiness: Acceptance  Method: Explanation, Demonstration  Response: Verbalizes Understanding, Demonstrated Understanding  Comment: educated on feeding tech, safety with mobility, POC.    Education Comments  No comments found.        Goals:  Encounter Problems       Encounter Problems (Active)       ADLs       Patient will perform UB and LB bathing with minimal assistance and verbal cues (Progressing)       Start:  11/13/24    Expected End:  11/27/24            Patient will independently complete upper body dressing donning and doffing all UE clothes  (Progressing)       Start:  11/13/24    Expected End:  11/27/24            Patient will complete lower body dressing donning and doffing all LE clothes with minimal assistance and verbal cues (Progressing)       Start:  11/13/24    Expected End:  11/27/24            Patient will independently complete daily grooming tasks.  (Progressing)       Start:  11/13/24    Expected End:  11/27/24            Patient will complete toileting including hygiene and clothing management with minimal assistance and verbal cues  (Progressing)       Start:  11/13/24    Expected End:  11/27/24               ADLs       Patient will feed self with modified independent level of assistance using dominant hand without spillage, using AE and environmental modifications as needed.       Start:   11/19/24    Expected End:  11/27/24               BALANCE       Pt will tolerate static/dynamic sitting tasks >10 min with CGA and without LOB during ADL tasks in order to return to PLOF.  (Progressing)       Start:  11/13/24    Expected End:  11/27/24               COGNITION/SAFETY       Patient will score WFL on standardized cognitive assessment with no cues and within reasonable time frame (Progressing)       Start:  11/13/24    Expected End:  11/27/24            Patient will follow 100% one-step commands with no verbal cues to allow improved ADL performance.  (Progressing)       Start:  11/13/24    Expected End:  11/27/24            Pt will complete ADL tasks involving sequencing, memory, and problem-solving with 100% accuracy.   (Progressing)       Start:  11/13/24    Expected End:  11/27/24               EXERCISE/STRENGTHENING       Patient will complete BUE exercises in order to improve strength to 4/5 in LUE and >/= 3/5 in RUE for ADL performance.   (Progressing)       Start:  11/13/24    Expected End:  11/27/24               TRANSFERS       Patient will perform bed mobility with minimal assistance and verbal cues in order to improve safety and independence with mobility.  (Progressing)       Start:  11/13/24    Expected End:  11/27/24            Patient will complete functional transfers with moderate assistance and LRAD as needed (Progressing)       Start:  11/13/24    Expected End:  11/27/24 11/19/24 at 3:03 PM   Marii Yeager, OT   [phone]

## 2024-11-19 NOTE — DISCHARGE INSTRUCTIONS
You were admitted to the hospital for weakness on your right side of your body. Your brain images showed occlusion of your left neck vessel which required a procedure to open it. Also you received a clot buster medication (TNK). Your symptoms improved afterwards. You were started on 2 medications together called aspirin and plavix until you underwent the procedure called (carotid endarterectomy) to open your neck vessel on 11/18.     Follow up:   - Neurosurgery follow up for wound check at 2 weeks and at 6 weeks with carotid ultrasound.  - Vascular surgery follow up in the next 4-8 weeks. (You will be called by the vascular office to confirm).  - Continue on Aspirin for stroke prevention. (No need for the second medication plavix)   - Start amlodipine 5 mg once daily for high blood pressure.  - Primary care follow up.

## 2024-11-20 ENCOUNTER — APPOINTMENT (OUTPATIENT)
Dept: RADIOLOGY | Facility: HOSPITAL | Age: 79
DRG: 038 | End: 2024-11-20
Payer: MEDICARE

## 2024-11-20 PROBLEM — R25.2 SPASTICITY: Status: ACTIVE | Noted: 2024-11-20

## 2024-11-20 LAB
ALBUMIN SERPL BCP-MCNC: 3.6 G/DL (ref 3.4–5)
ANION GAP SERPL CALC-SCNC: 12 MMOL/L (ref 10–20)
BASOPHILS # BLD AUTO: 0.01 X10*3/UL (ref 0–0.1)
BASOPHILS NFR BLD AUTO: 0.1 %
BUN SERPL-MCNC: 12 MG/DL (ref 6–23)
CA-I BLD-SCNC: 1.27 MMOL/L (ref 1.1–1.33)
CALCIUM SERPL-MCNC: 9.2 MG/DL (ref 8.6–10.6)
CHLORIDE SERPL-SCNC: 102 MMOL/L (ref 98–107)
CO2 SERPL-SCNC: 31 MMOL/L (ref 21–32)
CREAT SERPL-MCNC: 0.46 MG/DL (ref 0.5–1.05)
EGFRCR SERPLBLD CKD-EPI 2021: >90 ML/MIN/1.73M*2
EOSINOPHIL # BLD AUTO: 0.04 X10*3/UL (ref 0–0.4)
EOSINOPHIL NFR BLD AUTO: 0.4 %
ERYTHROCYTE [DISTWIDTH] IN BLOOD BY AUTOMATED COUNT: 14.5 % (ref 11.5–14.5)
GLUCOSE BLD MANUAL STRIP-MCNC: 103 MG/DL (ref 74–99)
GLUCOSE BLD MANUAL STRIP-MCNC: 103 MG/DL (ref 74–99)
GLUCOSE BLD MANUAL STRIP-MCNC: 105 MG/DL (ref 74–99)
GLUCOSE BLD MANUAL STRIP-MCNC: 128 MG/DL (ref 74–99)
GLUCOSE BLD MANUAL STRIP-MCNC: 132 MG/DL (ref 74–99)
GLUCOSE SERPL-MCNC: 102 MG/DL (ref 74–99)
HCT VFR BLD AUTO: 30 % (ref 36–46)
HGB BLD-MCNC: 9.4 G/DL (ref 12–16)
IMM GRANULOCYTES # BLD AUTO: 0.04 X10*3/UL (ref 0–0.5)
IMM GRANULOCYTES NFR BLD AUTO: 0.4 % (ref 0–0.9)
LYMPHOCYTES # BLD AUTO: 1.45 X10*3/UL (ref 0.8–3)
LYMPHOCYTES NFR BLD AUTO: 14 %
MAGNESIUM SERPL-MCNC: 1.96 MG/DL (ref 1.6–2.4)
MCH RBC QN AUTO: 29.4 PG (ref 26–34)
MCHC RBC AUTO-ENTMCNC: 31.3 G/DL (ref 32–36)
MCV RBC AUTO: 94 FL (ref 80–100)
MONOCYTES # BLD AUTO: 1.31 X10*3/UL (ref 0.05–0.8)
MONOCYTES NFR BLD AUTO: 12.6 %
NEUTROPHILS # BLD AUTO: 7.51 X10*3/UL (ref 1.6–5.5)
NEUTROPHILS NFR BLD AUTO: 72.5 %
NRBC BLD-RTO: 0 /100 WBCS (ref 0–0)
PHOSPHATE SERPL-MCNC: 2.9 MG/DL (ref 2.5–4.9)
PLATELET # BLD AUTO: 237 X10*3/UL (ref 150–450)
POTASSIUM SERPL-SCNC: 3.9 MMOL/L (ref 3.5–5.3)
RBC # BLD AUTO: 3.2 X10*6/UL (ref 4–5.2)
SODIUM SERPL-SCNC: 141 MMOL/L (ref 136–145)
WBC # BLD AUTO: 10.4 X10*3/UL (ref 4.4–11.3)

## 2024-11-20 PROCEDURE — 2500000002 HC RX 250 W HCPCS SELF ADMINISTERED DRUGS (ALT 637 FOR MEDICARE OP, ALT 636 FOR OP/ED)

## 2024-11-20 PROCEDURE — 2500000001 HC RX 250 WO HCPCS SELF ADMINISTERED DRUGS (ALT 637 FOR MEDICARE OP): Performed by: STUDENT IN AN ORGANIZED HEALTH CARE EDUCATION/TRAINING PROGRAM

## 2024-11-20 PROCEDURE — 99233 SBSQ HOSP IP/OBS HIGH 50: CPT | Performed by: PSYCHIATRY & NEUROLOGY

## 2024-11-20 PROCEDURE — 70450 CT HEAD/BRAIN W/O DYE: CPT

## 2024-11-20 PROCEDURE — 2500000005 HC RX 250 GENERAL PHARMACY W/O HCPCS: Performed by: STUDENT IN AN ORGANIZED HEALTH CARE EDUCATION/TRAINING PROGRAM

## 2024-11-20 PROCEDURE — 97535 SELF CARE MNGMENT TRAINING: CPT | Mod: GO

## 2024-11-20 PROCEDURE — 82330 ASSAY OF CALCIUM: CPT

## 2024-11-20 PROCEDURE — 1100000001 HC PRIVATE ROOM DAILY

## 2024-11-20 PROCEDURE — 2500000004 HC RX 250 GENERAL PHARMACY W/ HCPCS (ALT 636 FOR OP/ED): Performed by: STUDENT IN AN ORGANIZED HEALTH CARE EDUCATION/TRAINING PROGRAM

## 2024-11-20 PROCEDURE — 84100 ASSAY OF PHOSPHORUS: CPT | Performed by: STUDENT IN AN ORGANIZED HEALTH CARE EDUCATION/TRAINING PROGRAM

## 2024-11-20 PROCEDURE — 93010 ELECTROCARDIOGRAM REPORT: CPT | Performed by: INTERNAL MEDICINE

## 2024-11-20 PROCEDURE — 70450 CT HEAD/BRAIN W/O DYE: CPT | Performed by: RADIOLOGY

## 2024-11-20 PROCEDURE — 83735 ASSAY OF MAGNESIUM: CPT | Performed by: STUDENT IN AN ORGANIZED HEALTH CARE EDUCATION/TRAINING PROGRAM

## 2024-11-20 PROCEDURE — 97530 THERAPEUTIC ACTIVITIES: CPT | Mod: GO

## 2024-11-20 PROCEDURE — 97530 THERAPEUTIC ACTIVITIES: CPT | Mod: GP | Performed by: PHYSICAL THERAPIST

## 2024-11-20 PROCEDURE — 85025 COMPLETE CBC W/AUTO DIFF WBC: CPT | Performed by: STUDENT IN AN ORGANIZED HEALTH CARE EDUCATION/TRAINING PROGRAM

## 2024-11-20 PROCEDURE — 82947 ASSAY GLUCOSE BLOOD QUANT: CPT

## 2024-11-20 PROCEDURE — 92526 ORAL FUNCTION THERAPY: CPT | Mod: GN

## 2024-11-20 PROCEDURE — 94640 AIRWAY INHALATION TREATMENT: CPT

## 2024-11-20 PROCEDURE — 92507 TX SP LANG VOICE COMM INDIV: CPT | Mod: GN

## 2024-11-20 PROCEDURE — 37799 UNLISTED PX VASCULAR SURGERY: CPT

## 2024-11-20 PROCEDURE — 2500000002 HC RX 250 W HCPCS SELF ADMINISTERED DRUGS (ALT 637 FOR MEDICARE OP, ALT 636 FOR OP/ED): Performed by: STUDENT IN AN ORGANIZED HEALTH CARE EDUCATION/TRAINING PROGRAM

## 2024-11-20 RX ORDER — LABETALOL HYDROCHLORIDE 5 MG/ML
10 INJECTION, SOLUTION INTRAVENOUS EVERY 10 MIN PRN
Status: DISCONTINUED | OUTPATIENT
Start: 2024-11-20 | End: 2024-11-22 | Stop reason: HOSPADM

## 2024-11-20 RX ORDER — TIZANIDINE 4 MG/1
2 TABLET ORAL NIGHTLY
Status: DISCONTINUED | OUTPATIENT
Start: 2024-11-20 | End: 2024-11-22 | Stop reason: HOSPADM

## 2024-11-20 RX ORDER — ONDANSETRON 4 MG/1
4 TABLET, ORALLY DISINTEGRATING ORAL EVERY 8 HOURS PRN
Refills: 1 | Status: CANCELLED
Start: 2024-11-20 | End: 2024-12-20

## 2024-11-20 RX ORDER — TIZANIDINE 2 MG/1
2 TABLET ORAL NIGHTLY
Start: 2024-11-20 | End: 2024-12-20

## 2024-11-20 RX ORDER — HYDRALAZINE HYDROCHLORIDE 20 MG/ML
10 INJECTION INTRAMUSCULAR; INTRAVENOUS
Status: DISCONTINUED | OUTPATIENT
Start: 2024-11-20 | End: 2024-11-22 | Stop reason: HOSPADM

## 2024-11-20 RX ADMIN — FLUTICASONE FUROATE AND VILANTEROL TRIFENATATE 1 PUFF: 100; 25 POWDER RESPIRATORY (INHALATION) at 09:20

## 2024-11-20 RX ADMIN — TIOTROPIUM BROMIDE INHALATION SPRAY 2 PUFF: 3.12 SPRAY, METERED RESPIRATORY (INHALATION) at 09:20

## 2024-11-20 RX ADMIN — ATORVASTATIN CALCIUM 80 MG: 80 TABLET, FILM COATED ORAL at 22:15

## 2024-11-20 RX ADMIN — TIZANIDINE 2 MG: 4 TABLET ORAL at 22:15

## 2024-11-20 RX ADMIN — POLYETHYLENE GLYCOL 3350 17 G: 17 POWDER, FOR SOLUTION ORAL at 08:42

## 2024-11-20 RX ADMIN — BUSPIRONE HYDROCHLORIDE 10 MG: 10 TABLET ORAL at 22:15

## 2024-11-20 RX ADMIN — SENNOSIDES 17.2 MG: 8.6 TABLET, FILM COATED ORAL at 22:15

## 2024-11-20 RX ADMIN — MELATONIN 3 MG: 3 TAB ORAL at 22:15

## 2024-11-20 RX ADMIN — HYDROMORPHONE HYDROCHLORIDE 0.2 MG: 1 INJECTION, SOLUTION INTRAMUSCULAR; INTRAVENOUS; SUBCUTANEOUS at 00:31

## 2024-11-20 RX ADMIN — BUSPIRONE HYDROCHLORIDE 10 MG: 10 TABLET ORAL at 08:41

## 2024-11-20 RX ADMIN — LEVOTHYROXINE SODIUM 25 MCG: 25 TABLET ORAL at 06:30

## 2024-11-20 RX ADMIN — Medication 2 L/MIN: at 00:00

## 2024-11-20 RX ADMIN — PANTOPRAZOLE SODIUM 40 MG: 40 INJECTION, POWDER, FOR SOLUTION INTRAVENOUS at 06:30

## 2024-11-20 RX ADMIN — LIDOCAINE 1 PATCH: 4 PATCH TOPICAL at 08:42

## 2024-11-20 RX ADMIN — BUSPIRONE HYDROCHLORIDE 10 MG: 10 TABLET ORAL at 16:51

## 2024-11-20 RX ADMIN — AZITHROMYCIN DIHYDRATE 250 MG: 250 TABLET, FILM COATED ORAL at 08:42

## 2024-11-20 RX ADMIN — SENNOSIDES 17.2 MG: 8.6 TABLET, FILM COATED ORAL at 08:41

## 2024-11-20 RX ADMIN — HEPARIN SODIUM 5000 UNITS: 5000 INJECTION INTRAVENOUS; SUBCUTANEOUS at 16:51

## 2024-11-20 RX ADMIN — HEPARIN SODIUM 5000 UNITS: 5000 INJECTION INTRAVENOUS; SUBCUTANEOUS at 00:15

## 2024-11-20 RX ADMIN — ASPIRIN 81 MG CHEWABLE TABLET 81 MG: 81 TABLET CHEWABLE at 22:15

## 2024-11-20 RX ADMIN — HEPARIN SODIUM 5000 UNITS: 5000 INJECTION INTRAVENOUS; SUBCUTANEOUS at 08:42

## 2024-11-20 ASSESSMENT — COGNITIVE AND FUNCTIONAL STATUS - GENERAL
MOVING FROM LYING ON BACK TO SITTING ON SIDE OF FLAT BED WITH BEDRAILS: A LOT
EATING MEALS: A LOT
DRESSING REGULAR LOWER BODY CLOTHING: A LOT
WALKING IN HOSPITAL ROOM: TOTAL
HELP NEEDED FOR BATHING: A LOT
CLIMB 3 TO 5 STEPS WITH RAILING: TOTAL
PERSONAL GROOMING: A LOT
TURNING FROM BACK TO SIDE WHILE IN FLAT BAD: A LOT
MOVING TO AND FROM BED TO CHAIR: A LOT
STANDING UP FROM CHAIR USING ARMS: A LOT
DRESSING REGULAR UPPER BODY CLOTHING: A LOT
TOILETING: A LOT
MOBILITY SCORE: 10
DAILY ACTIVITIY SCORE: 12

## 2024-11-20 ASSESSMENT — PAIN SCALES - GENERAL
PAINLEVEL_OUTOF10: 0 - NO PAIN
PAINLEVEL_OUTOF10: 4
PAINLEVEL_OUTOF10: 0 - NO PAIN
PAINLEVEL_OUTOF10: 8

## 2024-11-20 ASSESSMENT — PAIN - FUNCTIONAL ASSESSMENT
PAIN_FUNCTIONAL_ASSESSMENT: 0-10

## 2024-11-20 ASSESSMENT — ACTIVITIES OF DAILY LIVING (ADL): HOME_MANAGEMENT_TIME_ENTRY: 15

## 2024-11-20 NOTE — CARE PLAN
Problem: Pain - Adult  Goal: Verbalizes/displays adequate comfort level or baseline comfort level  Outcome: Progressing     Problem: Safety - Adult  Goal: Free from fall injury  Outcome: Progressing     Problem: General Stroke  Goal: Demonstrate improvement in neurological exam throughout the shift  Outcome: Progressing  Goal: Maintain BP within ordered limits throughout shift  Outcome: Progressing  Goal: Participate in treatment (ie., meds, therapy) throughout shift  Outcome: Progressing  Goal: No symptoms of aspiration throughout shift  Outcome: Progressing

## 2024-11-20 NOTE — PROGRESS NOTES
Physical Therapy    Physical Therapy Treatment    Patient Name: Zulma Rao  MRN: [id number]  Department: [facility]  Room: 09/09-A  Today's Date: 11/20/2024  Time Calculation  Start Time: 1138  Stop Time: 1217  Time Calculation (min): 39 min         Assessment/Plan   PT Assessment  PT Assessment Results: Decreased strength, Decreased endurance, Impaired balance, Decreased mobility, Decreased cognition, Decreased safety awareness  Rehab Prognosis: Fair  Barriers to Discharge: medical instability  Evaluation/Treatment Tolerance: Patient limited by fatigue  Medical Staff Made Aware: Yes  End of Session Communication: Bedside nurse  Assessment Comment: Pt demonsrated progress in overall activity tolerance. Pt continues to demonstrate impaired dynamic balance and strength and would benefit from continued skilled physical therapy to improve overall functonal mobility.  End of Session Patient Position: Bed, 3 rail up, Alarm off, not on at start of session  PT Plan  Inpatient/Swing Bed or Outpatient: Inpatient  PT Plan  Treatment/Interventions: Bed mobility, Transfer training, Stair training, Gait training, Balance training, Neuromuscular re-education, Strengthening, Endurance training, Therapeutic activity, Therapeutic exercise, Home exercise program, Positioning, Postural re-education, Range of motion  PT Plan: Ongoing PT  PT Frequency: 5 times per week  PT Discharge Recommendations: Moderate intensity level of continued care  PT Recommended Transfer Status: Total assist  PT - OK to Discharge: Yes (Once medically cleared)      General Visit Information:   PT  Visit  PT Received On: 11/20/24  Response to Previous Treatment: Patient with no complaints from previous session.  General  Reason for Referral: Admitted 11/11/2024 with Cerebrovascular accident (CVA) due to occlusion of left middle cerebral artery (Multi) after transferred for mechanical thrombectomy. 11/11 TNK and spontaneous recanalization on angiogram TICI 3.  11/18 s/p L CEA  Past Medical History Relevant to Rehab: prior R sided stroke with left sided deficit (per personal review of imaging, L sided posterior limb of internal capsule) 7/2024, bilateral carotid artery stenosis S/P R CEA 2011, HFpEF, NSTEMI, T2DM, HT, DLD, hypothyroidism, COPD, HCC S/P partial hepatectomy (2019), anxiety, b/l cataracts  Missed Visit: No  Family/Caregiver Present: No  Caregiver Feedback: n/a  Co-Treatment: OT  Co-Treatment Reason: to maximize safety and functional mobility  Prior to Session Communication: Bedside nurse  Patient Position Received: Bed, 3 rail up, Alarm on  Preferred Learning Style: verbal  General Comment: Pt agreeable to therapy. Pt preferred to keep RUE in elbow flexion and supination throughout session    Subjective   Precautions:  Precautions  Hearing/Visual Limitations: WFL  Medical Precautions: Fall precautions  Precautions Comment: SBP <160 per RN            Objective   Pain:  Pain Assessment  Pain Assessment: 0-10  0-10 (Numeric) Pain Score: 0 - No pain  Cognition:  Cognition  Orientation Level: Oriented X4  Coordination:     Postural Control:  Postural Control  Postural Control: Impaired  Static Sitting Balance  Static Sitting-Balance Support: Bilateral upper extremity supported, Feet supported  Static Sitting-Level of Assistance: Minimum assistance  Dynamic Sitting Balance  Dynamic Sitting-Balance Support: Feet supported  Dynamic Sitting-Level of Assistance: Moderate assistance  Static Standing Balance  Static Standing-Balance Support: Right upper extremity supported, Left upper extremity supported  Static Standing-Comment/Number of Minutes: mod A  Dynamic Standing Balance  Dynamic Standing-Balance Support: Right upper extremity supported, Left upper extremity supported  Dynamic Standing-Comments: mod A    Activity Tolerance:  Activity Tolerance  Endurance: Tolerates 10 - 20 min exercise with multiple rests  Early Mobility/Exercise Safety Screen: Proceed with  mobilization - No exclusion criteria met  Treatments:       Bed Mobility  Bed Mobility: Yes  Bed Mobility 1  Bed Mobility 1: Supine to sitting, Sitting to supine  Level of Assistance 1: Moderate assistance  Bed Mobility Comments 1: Performed with HOB elevated an duces for sequencing    Ambulation/Gait Training  Ambulation/Gait Training Performed: Yes  Ambulation/Gait Training 1  Surface 1: Level tile  Device 1:  (bilateral arm in arm assist)  Assistance 1: Arm in arm assistance, Moderate assistance  Quality of Gait 1: Decreased step length, Shuffling gait  Comments/Distance (ft) 1: 3 feet to bedside chair  Transfers  Transfer: Yes  Transfer 1  Transfer From 1: Sit to, Stand to  Transfer to 1: Stand, Sit  Technique 1: Sit to stand, Stand to sit  Transfer Level of Assistance 1: +2, Moderate verbal cues, Arm in arm assistance  Trials/Comments 1: Pt performed this task three times during session.  Transfers 2  Transfer From 2: Bed to  Transfer to 2: Chair with arms  Technique 2:  (stepping)  Transfer Level of Assistance 2: Moderate assistance  Trials/Comments 2: assistx2. Pt demonstrated R lateral lean  Transfers 3  Transfer From 3: Bed to  Transfer to 3: Chair with arms  Technique 3: Stand pivot  Transfer Device 3:  (arm in arm assist)  Transfer Level of Assistance 3: Moderate assistance, +2    Outcome Measures:  [facility] Basic Mobility  Turning from your back to your side while in a flat bed without using bedrails: A lot  Moving from lying on your back to sitting on the side of a flat bed without using bedrails: A lot  Moving to and from bed to chair (including a wheelchair): A lot  Standing up from a chair using your arms (e.g. wheelchair or bedside chair): A lot  To walk in hospital room: Total  Climbing 3-5 steps with railing: Total  Basic Mobility - Total Score: 10    Education Documentation  Body Mechanics, taught by Gabriela Dias, PT at 11/20/2024  3:03 PM.  Learner: Patient  Readiness: Acceptance  Method:  Explanation  Response: Verbalizes Understanding    Home Exercise Program, taught by Gabriela Dias PT at 11/20/2024  3:03 PM.  Learner: Patient  Readiness: Acceptance  Method: Explanation  Response: Verbalizes Understanding    Mobility Training, taught by Gabriela Dias PT at 11/20/2024  3:03 PM.  Learner: Patient  Readiness: Acceptance  Method: Explanation  Response: Verbalizes Understanding    Education Comments  No comments found.        OP EDUCATION:       Encounter Problems       Encounter Problems (Active)       PT Problem       Patient is able to perform sit to stand requiring contact guard assist or less  (Not met)       Start:  11/13/24    Expected End:  11/27/24    Resolved:  11/19/24    Updated to: Patient will perform sit to stand and stand to sit transfers with </= MOD A x1 and LRD to increase functional strength.    Update reason: Updating plan of care         Patient is able to ambulate 150 feet without loss of balance requiring contact guard or less assist  (Not met)       Start:  11/13/24    Expected End:  11/27/24    Resolved:  11/19/24    Updated to: Patient will ambulate at least 10 ft. with </= MAX A x1 and LRD with VSS to improve tolerance of household distances.    Update reason: Updating plan of care         Patient is able to perform supine to sit transfer requiring contact guard assist or less (Not met)       Start:  11/13/24    Expected End:  11/27/24    Resolved:  11/19/24    Updated to: Patient will perform bed mobility with </= MIN A x1 to reduce risk of developing decubitus ulcers.    Update reason: Updating plan of care         Patient will perform sit to stand and stand to sit transfers with </= MOD A x1 and LRD to increase functional strength. (Progressing)       Start:  11/19/24    Expected End:  11/27/24                Patient will ambulate at least 10 ft. with </= MAX A x1 and LRD with VSS to improve tolerance of household distances. (Progressing)       Start:   11/19/24    Expected End:  11/27/24                Patient will perform bed mobility with </= MIN A x1 to reduce risk of developing decubitus ulcers. (Progressing)       Start:  11/19/24    Expected End:  11/27/24                Patient will perform home exercise program as prescribed with cues as needed.   (Progressing)       Start:  11/19/24    Expected End:  11/27/24                Patient will score at least 42/56 on the Function in Sitting Test to improve sitting balance required for functional tasks.   (Progressing)       Start:  11/19/24    Expected End:  11/27/24                   Pain - Adult

## 2024-11-20 NOTE — PROGRESS NOTES
"Zulma Rao is a 79 y.o. female on day 9 of admission presenting with Cerebrovascular accident (CVA) due to occlusion of left middle cerebral artery (Multi).    Subjective   Overnight, the patient had an exam change 11/20/2024 ~0430 per overnight team, noted to have poor ability to respond to questions, right upper and lower extremity spasticity, and right upper and lower extremity pain. Of note, she was given 0.2 mg Dilaudid at 0031 for acute R arm pain, subsequently noted to have overnight BP of 94/43 @ 0200, 111/44 @ 0400, and 174/76 with tachycardia to  @ 0415 preceding her overnight exam findings. CT head non-contrast was performed to rule out acute bleed, and did not demonstrate any acute intraparenchymal hemorrhage. Upon her return to the floor, her exam returned to baseline by 0600.    This morning, the patient did not endorse any new concerns. She denied any pain at rest, but endorsed experiencing intermittent B/L hip pain (R>L) rated at 7/10 on average, minimally improved with Tylenol, and improved with PRN Dilaudid to 4/10. She notes that she has experienced this pain intermittently at baseline prior to admission since her R hip surgical fixation 09/2024, although it has occurred more frequently since admission. She believes pain episodes are primarily associated with position, and typically resolve after positional adjustment.     She denies any fevers, chills, nausea, vomiting, new vision changes, new fatigue, palpitations, chest pain, dyspnea.        Objective   Last Recorded Vitals  Blood pressure 153/83, pulse 101, temperature 36.7 °C (98.1 °F), temperature source Temporal, resp. rate 17, height 1.626 m (5' 4\"), weight 47.4 kg (104 lb 8 oz), SpO2 100%.        11/20/2024     2:00 AM 11/20/2024     4:00 AM 11/20/2024     4:15 AM 11/20/2024     6:00 AM 11/20/2024     8:00 AM 11/20/2024    10:00 AM 11/20/2024    12:00 PM   Vitals   Systolic 94 111 174 141 160 126 153   Diastolic 43 44 76 82 " 105 68 83   BP Location  Left arm        Heart Rate 73 73 122 103 99 92 101   Temp  36.5 °C (97.7 °F)   36.5 °C (97.7 °F)  36.7 °C (98.1 °F)   Resp 10 14 22 12 18 18 17     NIHSS    1a  Level of consciousness: 0=alert; keenly responsive   1b. LOC questions:  0=Performs both tasks correctly   1c. LOC commands: 0=Performs both tasks correctly   2.  Best Gaze: 0=normal   3. Visual: 0=No visual loss   4. Facial Palsy: 0=Normal symmetric movement   5a. Motor Left Arm: 0=No drift, limb holds 90 (or 45) degrees for full 10 seconds   5b.  Motor Right Arm: 2=Some effort against gravity, limb cannot get to or maintain (if cured) 90 (or 45) degrees, drifts down to bed, but has some effort against gravity   6a. Motor Left Le=No drift, limb holds 90 (or 45) degrees for full 10 seconds   6b  Motor Right Le=Drift, limb holds 90 (or 45) degrees but drifts down before full 10 seconds: does not hit bed   7. Limb Ataxia: 0=Absent   8.  Sensory: 0=Normal; no sensory loss   9. Best Language:  0=No aphasia, normal   10. Dysarthria: 0=Normal   11. Extinction and Inattention: 0=No abnormality          Total:   3       Neurological Exam  Mental Status   Oriented to person, place, time and situation. Speech is normal. Language is fluent with no aphasia.  Some difficulty following instructions/commands given throughout examination, although able to complete tasks with repeated instructions. Unchanged from prior exams. .    Cranial Nerves  CN II: Right normal visual field. Left normal visual field.  CN III, IV, VI: Extraocular movements intact bilaterally. Normal lids and orbits bilaterally.   Right pupil: 3 mm. Round. Reactive to light. Reactive to accommodation.   Left pupil: 4 mm. Round. Abnormal reactivity: Sluggish. Reactive to accommodation.  Relative afferent pupillary defect absent.  CN V:  Right: Facial sensation is normal.  Left: Facial sensation is normal on the left.  CN VII: Full and symmetric facial movement.  CN VIII:  Hearing grossly intact to conversation.  CN IX, X: Palate elevates symmetrically  CN XI:  Right: Sternocleidomastoid strength is normal. Trapezius strength is weak. 1/5 strength on shoulder shrug .  Left: Sternocleidomastoid strength is normal. Trapezius strength is normal.  CN XII: Tongue midline without atrophy or fasciculations.    Minimal anisocoria noted on exam, L pupillary constriction sluggish to light. Exam was performed with brighter room lights today compared to prior days. Note history of unspecified R eye surgery 2011, B/L cataract surgeries 2000. .    Motor  Normal muscle bulk throughout. No fasciculations present. Normal muscle tone. Flexor tone increased proximal RUE. No abnormal involuntary movements. No pronator drift. Right hemiparesis. Proximal RUE and RLE.  RUE: Proximally antigravity 3/5,  strength 4+/5 distally .  RLE: 4+/5 proximally on hip extension and distally with plantar & dorsiflexion. Endorsed R hip pain with movement.    LUE: Proximally 5/5 on shoulder abduction, elbow flexion/extension. Distally 5/5 with  strength.  LLE: 5/5 proximally on hip extension; distally 5/5 with plantar & dorsiflexion. Minimal L hip pain with movement.   .    Sensory  Light touch is normal in upper and lower extremities.   Sensation to light touch symmetric bilaterally. .    Reflexes                                            Right                      Left  Brachioradialis                    3+                         3+  Biceps                                 3+                         3+  Triceps                                3+                         3+  Patellar                                3+                         3+  Achilles                                3+                         3+  Right Plantar: downgoing  Left Plantar: downgoing    Coordination  Left: Finger-to-nose normal.  Unable to assess R ringer to nose due to limited RUE proximal strength .    [place] Coma Scale  Best Eye  Response: Spontaneous  Best Verbal Response: Oriented  Best Motor Response: Follows commands  Jim Coma Scale Score: 15              Scheduled medications  aspirin, 81 mg, oral, Daily  atorvastatin, 80 mg, oral, Nightly  azithromycin, 250 mg, oral, Once per day on Monday Wednesday Friday  busPIRone, 10 mg, oral, TID  [Held by provider] clopidogrel, 75 mg, oral, Daily  pantoprazole, 40 mg, oral, Daily before breakfast   Or  esomeprazole, 40 mg, nasoduodenal tube, Daily before breakfast   Or  pantoprazole, 40 mg, intravenous, Daily before breakfast  tiotropium, 2 puff, inhalation, Daily   And  fluticasone furoate-vilanteroL, 1 puff, inhalation, Daily  heparin (porcine), 5,000 Units, subcutaneous, q8h  insulin lispro, 0-5 Units, subcutaneous, TID AC  levothyroxine, 25 mcg, oral, Daily  lidocaine, 1 patch, transdermal, Daily  melatonin, 3 mg, oral, Nightly  [Held by provider] midodrine, 5 mg, oral, TID  polyethylene glycol, 17 g, oral, Daily  sennosides, 2 tablet, oral, BID      Continuous medications     PRN medications  PRN medications: acetaminophen, alteplase, calcium gluconate, calcium gluconate, dextrose, dextrose, glucagon, glucagon, hydrALAZINE, HYDROmorphone, ipratropium-albuteroL, labetaloL, magnesium sulfate, magnesium sulfate, ondansetron ODT **OR** ondansetron, oxygen, potassium chloride CR **OR** potassium chloride, potassium chloride CR **OR** potassium chloride, potassium chloride    Labs:    Results from last 72 hours   Lab Units 11/20/24  0021 11/19/24 0007 11/18/24 0226   SODIUM mmol/L 141 141 141   POTASSIUM mmol/L 3.9 3.6 3.7   BUN mg/dL 12 11 6   CREATININE mg/dL 0.46* 0.39* 0.44*   CALCIUM mg/dL 9.2 8.9 9.4   MAGNESIUM mg/dL 1.96 2.01 2.01      Results from last 72 hours   Lab Units 11/20/24  0020 11/19/24 0007 11/18/24 0226   WBC AUTO x10*3/uL 10.4 6.8 9.3   HEMOGLOBIN g/dL 9.4* 9.5* 11.4*   HEMATOCRIT % 30.0* 29.4* 33.0*   PLATELETS AUTO x10*3/uL 237 201 221      IMAGING:  CT head wo IV  contrast 11/20/2024  IMPRESSION:  1. Continued evolution of the acute to subacute ischemic infarct  affecting the left cerebral hemisphere predominantly in the left  parasagittal frontal lobe. No evidence to suggest hemorrhagic  transformation.  2. Focal encephalomalacia in the right occipital lobe. Mild  microangiopathic disease and diffuse parenchymal volume loss.    MRI 11/14/2024  FINDINGS:  CSF Spaces: Diffuse prominence of the ventricles and sulci is noted.  The basal cisterns are clear. Appearance is unchanged since the  recent MRI brain study.      Parenchyma: Compared to MRI 11/13/2024, interval increase in size of  restricted diffusion with associated T2/FLAIR hyperintensity  involving the left cerebral hemispheric white matter primarily within  the white matter. Additional foci of restricted diffusion with  associated T2/FLAIR hyperintensity are noted within the bilateral  frontal lobes including the precentral gyri, left parietal lobe, left  temporal lobe, and left insula as well as the left mesial temporal  lobe. No striking hemorrhagic transformation or striking mass effect.      Few new small foci of restricted diffusion are seen, including within  the left parietal lobe gray-white matter junction (series 3 image 50  of 76) and a small focus (Series 3, Image 53) of restricted diffusion  located within the left parietal lobe gray matter (series 3, image 53  of 76) was faintly seen on the prior MRI.      Encephalomalacia/gliosis is again noted in the posterior right  occipital lobe. Chronic right thalamic infarct is again seen.  Additional patchy areas of T2/FLAIR hyperintensity are noted within  the deep, periventricular, and subcortical white matter bilaterally,  likely related to chronic small vessel ischemic changes. Small old  infarct in the right cerebellum is unchanged. There is no mass effect  or midline shift.      Loss of flow void located within distal right cervical ICA and  portions of the  right intracranial ICA consistent with occlusion and  better evaluated on the prior CTA head study.      Paranasal Sinuses and Mastoids: Visualized paranasal sinuses and  mastoid air cells are unremarkable.      IMPRESSION:  1. Evolving infarcts located within the left cerebral hemisphere,  including in the watershed distribution, overall more pronounced in  appearance but similar in extent with no hemorrhagic transformation  or surrounding mass effect.      2. Additional evolving acute infarcts are seen located within the  bilateral cerebral hemispheres and are stable to slightly more  pronounced in appearance. Few new punctate acute infarcts located  within the left cerebral hemisphere. There is no surrounding mass  effect or hemorrhagic transformation.     MR brain wo IV contrast  Result Date: 11/13/2024 1:26 am     FINDINGS:   CSF Spaces: The ventricles, sulci and basal cisterns are within normal limits for patient's age.     Parenchyma: Watershed distribution areas of T2/FLAIR hyperintense signal with associated diffusion restriction throughout the left cerebral hemisphere most pronounced in the superior left frontal lobe but also involving the parietal and medial temporal lobes. Finding is consistent with acute/subacute watershed infarction.   Cortical foci of diffusion restriction in the left frontal lobe cortex along the interhemispheric aspect, in the left sylvian fissure and posterior left insula and in the left posterior temporal lobe. These findings are consistent with non watershed distribution   2.5 cm focus of encephalomalacia within the right occipital lobe consistent with previous infarction in the distribution of the right posterior cerebral artery. Focus of subacute/chronic lacunar infarction in the right thalamus measuring a proximally 5 mm x 15 mm in size. Focus of susceptibility artifact within the left basal ganglia favored to correspond to mineralization seen on prior CT. Nonspecific patchy  T2/FLAIR hyperintense signal within the periventricular and subcortical white matter, likely sequela of chronic microangiopathy.   There is no mass effect or midline shift.        Paranasal Sinuses and Mastoids: Visualized paranasal sinuses and mastoid air cells are unremarkable.   Note is made of postsurgical changes from bilateral lens replacements.        Impression  1. Acute/subacute watershed ischemia throughout the left cerebral hemisphere   2. Subcentimeter cortical infarctions involving the left superior frontal lobe but also involving the left parietal and temporal lobes. No evidence of hemorrhagic transformation.   3. Nonspecific white matter changes which can be seen in the setting of chronic microangiopathy.        Transthoracic Echo (TTE) Complete  11/12/2024    CONCLUSIONS:    1. Left ventricular ejection fraction is normal, by visual estimate at 65-70%.    2. Spectral Doppler shows an abnormal pattern of left ventricular diastolic filling.    3. Left ventricular cavity size is decreased.    4. No left ventricular thrombus visualized.    5. There is normal right ventricular global systolic function.    6. The left atrium is severely dilated.    7. Agitated saline contrast study was negative for intracardiac shunt.    8. Normal aortic root.    9. No prior echocardiogram available for comparison.      CT ANGIO AORTA AND BILATERAL ILIOFEMORAL RUN OFF INCLUDING WITHOUT  CONTRAST   11/12/2024  IMPRESSION:  1. Extensive vascular disease including occlusion of the infrarenal  abdominal aorta, bilateral common iliac arteries. Additionally, no  appreciated flow is noted in the left posterior tibial artery  distally. Please refer to the detailed description above.  2. Other nonspecific finding as detailed above.     CT head wo IV contrast; CT angio head and neck w and wo IV contrast  11/12/2024 5:34 am     FINDINGS: NON-CONTRAST HEAD CT:     BRAIN PARENCHYMA:  No evidence of acute intraparenchymal hemorrhage or  parenchymal evidence of an acute large territory ischemic infarct. No mass-effect, midline shift or effacement of cerebral sulci. Gray-white matter distinction is preserved. Punctate calcifications within the left-greater-than-right basal ganglia. Lacunar within the posterior limb of the right internal capsule and left basal ganglia. Small amount of encephalomalacia and gliosis within the right occipital lobe.     VENTRICLES and EXTRA-AXIAL SPACES:  No acute extra-axial or intraventricular hemorrhage. Ventricles and sulci are age-concordant.     PARANASAL SINUSES/MASTOIDS:  No hemorrhage or air-fluid levels within the visualized paranasal sinuses. The mastoids are well aerated.     CALVARIUM/ORBITS:  No skull fracture.  The orbits and globes are intact to the extent visualized.     EXTRACRANIAL SOFT TISSUES: No discernible abnormality.         CTA NECK:   There is calcified and noncalcified plaque along the aortic arch and origins of the great vessels. There is marked narrowing at the origin of the right common carotid artery and right subclavian artery. There is moderate narrowing secondary to noncalcified plaque at the origin of the left subclavian artery.     LEFT VERTEBRAL ARTERY:  No hemodynamically significant stenosis, occlusion, or dissection. The left vertebral artery is dominant.     LEFT COMMON/INTERNAL CAROTID ARTERY:  There is calcified plaque along the course of the common carotid artery with mild-to-moderate narrowing. There is coarse calcification at the carotid bifurcation and along the proximal cervical internal carotid artery with a proximally 65% stenosis by NASCET criteria. The cervical internal carotid artery is patent to the level of the skull base without additional stenosis.     RIGHT VERTEBRAL ARTERY: There is coarse atherosclerotic calcification at the origin of the right vertebral artery. No hemodynamically significant stenosis, occlusion, or dissection.     RIGHT COMMON/INTERNAL CAROTID  ARTERY:  There is occlusion of the right common carotid artery just beyond the origin. There is occlusion of the right cervical internal carotid artery.   Limited images through the lung apices demonstrate emphysematous changes. Mild degenerative changes of the cervical spine without evidence of high-grade spinal canal stenosis. Soft tissues of the neck are unremarkable.            CTA HEAD:     ANTERIOR CIRCULATION: No aneurysm.    - Internal Carotid Arteries: There is occlusion of the right petrous and cavernous internal carotid artery. There is reconstitution within the supraclinoid segment. The left intracranial carotid artery is patent. There is atherosclerotic calcification along the cavernous and supraclinoid ICA with mild narrowing.     - Middle Cerebral Arteries: No hemodynamically significant stenosis or occlusion.     - Anterior Cerebral Arteries: There is and abrupt cutoff of the distal left anterior cerebral artery (series 504, image 82). The right DAKOTA is without hemodynamically significant stenosis or occlusion.       POSTERIOR CIRCULATION: No aneurysm.     - Intracranial Vertebral Arteries:  No hemodynamically significant stenosis or occlusion.     - Basilar Artery:  No hemodynamically significant stenosis or occlusion.     - Posterior Cerebral Arteries:  No hemodynamically significant stenosis or occlusion.        CTA NECK:     Complete occlusion of the right common carotid artery and right cervical internal carotid artery.   Coarse atherosclerotic calcification at the left carotid bifurcation with approximately 65% stenosis by NASCET criteria.   The vertebral arteries are patent. The left vertebral artery is dominant. There is a coarse calcification at the origin of the right vertebral artery.   Calcified and noncalcified plaque along the aortic arch and origins of the great vessels with narrowing.     CTA head:   There is an abrupt cutoff of the distal left anterior cerebral artery.   Occlusion of  the right petrous and cavernous carotid arteries with reconstitution in the supraclinoid segment.   The left intracranial carotid artery is patent with coarse atherosclerotic calcification and mild narrowing.   The posterior circulation is patent without significant stenosis.        CT HEAD:   No acute intracranial hemorrhage or mass effect.     ---     CT head wo IV contrast 11/11/2024 9:03 pm     FINDINGS:   CSF Spaces: Ventricles, cortical sulci and basal cisterns are prominent reflecting age related involutional changes and mild volume loss. Mildly prominent extra-axial CSF space within the bifrontal region may represent asymmetric volume loss. There is no extraaxial fluid collection.     Parenchyma: There is no acute intraparenchymal hemorrhage or parenchymal evidence of acute large territorial ischemic infarction. Patchy white matter hypodensity likely represents microangiopathy. Lucency within the right basal ganglia likely represents a lacunar infarct. Small area of encephalomalacia and gliosis within the right occipital lobe. Of note multiple intracranial vessels are hyperdense, likely secondary to recent IR neuro angiogram. The grey-white differentiation is intact. There is no mass effect or midline shift.     Calvarium: The calvarium is unremarkable.     Paranasal sinuses and mastoids: Visualized paranasal sinuses and mastoids are clear.        No acute intracranial hemorrhage or mass effect.   Small focal area of encephalomalacia and gliosis within the right occipital lobe.   Mild white matter changes compatible with microangiopathy. Lacunar infarct within the right basal ganglia.           Assessment/Plan      Assessment & Plan  Cerebrovascular accident (CVA) due to occlusion of left middle cerebral artery (Multi)    Assessment:  Ms. Zulma Rao is an 80 YO RH white F with history of stroke in 7/2024, HTN, HLD, R CEA 2011, HFpEF, COPD, anxiety, partial hepatectomy for HCC 2019 transferred from  [facility] for acute left MCA infarct with NIH 21, s/p TNK. MT performed, but patient's left had already recanalized. Admitted to [facility] for continued monitoring on 11/11 with NIH of 4 with small stroke burden largely in L MCA-DAKOTA watershed.  Noted to have deterioration in neurological examination (NIH 13) when SBP below 170-180 and started on vasopressor therapy. Noted to again deteriorate in terms of R arm strength when off vasopressor therapy with repeat MRI brain on 11/14 demonstrating increased in stroke burden in the L MCA-DAKOTA watershed. Pressor therapy restarted. Patient's  clarified she has been on DAPT since 07/2024 and therefore we conclude that she is essentially failing maximal medical therapy. NSGY consulted for L CEA given 65% stenosis and 100% occlusion of the R ICA on CTA head/neck. L CEA OR on 11/18/24. Lt carotid artery endarterectomy was done on 11/18, the findings were thick calcified plaque. NSGY plans for SBP target is 140-160, now maintained on PRN IV labetalol, hydralazine. Plan to continue to monitor BP lability pending dispo, neuro exams since L CEA have remained stable. Will not plan to re-initiate Plavix due to recent L CEA, although medication re-initiation cleared by NSGY 7 days post-op. Consider anxiety as contributory to BP fluctuations, currently managed with Buspar 10 mg TID. 11/20 overnight exam change may be related to overnight Dilaudid dose, extremity pain on exam, and/or poor sleep. CT head non-contrast was w/o evidence of acute bleed, return to baseline by 0600 lower concern for intraparenchymal hemorrhage. Anisocoria noted and sluggish L pupillary response may be be related to prior history of ocular surgeries, no clear correlates seen on neuroimaging. Overall, patient is medically ready for discharge to SNF, will consider optimization of pain control with tizanidine.     Type: Ischemic stroke  Subtype/etiology: Artery to artery  Vessels involved: left MCA/DAKOTA  watershed  Neurological manifestations: proximal > distal RUE weakness. Improved R sided facial droop, dysarthria, expressive aphasia, proximal RLE weakness.   NIHSS (worst at presentation): 21   Antiplatelet/antithrombotic plan for stroke prevention: Aspirin; clopidogrel (currently HELD prior to 11/18 L CEA)  VTE prophylaxis: SCDs, Heparin 5000 units Q8hr (held prior to 11/18 L CEA)     Vascular Risk Factor modification goals:  Blood pressure goals: avoid hypotension SBP <100 that could worsen cerebral perfusion, Ischemic stroke post-thrombectomy   Lipid Goals: education on healthy diet and statin therapy to maintain or achieve goal LDL-cholesterol < 70mg  Glucose Goals: early treatment of hyperglycemia to goal glucose 140-180 mg/dl with long-term goal A1c < 7%   Smoking Cessation and Education  Assessment for Rehabilitation needs   Patient and family education on signs and symptoms of stroke, calling 911, healthy strategies for stroke prevention.       Updates 11/20/2024:  - ROXANE status  - CT non-contrast head 11/20 shows no evidence of intraparenchymal hemorrhage  - Start Tizanidine 2mg at bedtime  - 11/19 PT/OT evaluations: recommending SNF for rehab  - Patient medically ready for discharge, pending insurance approval.  - SBP goal remains 140-160, will maintain with PRN labetalol and hydralazine.   - Ctm with BP cuff     Plan:  # Acute Left MCA infarct s/p TNK (11/11 @ 1255) and MT (already recanalized - no Tici Score)  # History of HTN, HLD  # Post-operative woud s/p L CEA 11/18  :: LDL 92; A1c 5.8%  :: NIHSS 21 at time of transfer to [facility] [place] ED --> 3 this morning.     :: MRI brain w/o contrast with L cerebral hemisphere acute-subacute ischemia; subcentimeter cortical infarcts of L superior frontal, parietal, annd temporal lobes. 11/14: Stroke burden increased in size within the same distribution as prior comparison 11/13.  :: CTA with atherosclerotic calcification at the left carotid bifurcation with approximately  65% stenosis by NASCET criteria  :: TTE: Bubble study negative. LVEF 65-70%. No thrombus. Severe LA dilatation.   :: cvEEG with no epileptiform activity.  :: Lt carotid artery endarterectomy was done on 11/18, the findings were thick calcified plaque. NSGY plans for SBP target is 140-160.   Plan:  - ROXANE status, Q2hr neuro checks  - Ctn BP monitoring s/p L CEA with BP cuff  - Maintain SBP goal 140-160 per NSGY   - PRN labetalol 10 mg, hydralazine 10 mg   - Continue ASA 81 mg daily  - Plavix cleared for post operative day 7 per NSGY, but do not plan to re-initiate Plavix s/p L CEA.   - DVT PPx: Q8hr subcutaneous heparin, SCDs  - Continue atorvastatin 80mg  - PT/OT recommend moderate intensity level of continued care upon discharge  - NSGY to schedule wound check post-op 2 weeks, carotid ultrasound 6 weeks.     # Severe PAD 2/2 Chronic infrarenal aortic occlusion  # HFpEF  # History of HTN, HLD  :: ECHO 11/12/24: LVEF 65-70%, LVH, severely dilated LA, negative bubble study  :: CT Aorti-ileofemoral w/ runoff: Extensive vascular disease including occlusion of the infrarenal abdominal aorta, bilateral common iliac arteries, no flow appreciated left PT   Plan:  - Continue to monitor on telemetry  - BP goal: 140-160  --> weaned off of midodrine, norepinephrine  - PRN labetalol 10 mg, hydralazine 10 mg  - Vasc Sx signed off, per note: no acute intervention. Good flow to radial artery. Chronic infrarenal aortic occlusion. Continue to monitor doppler signals  - Vasc Sx to schedule outpatient clinic follow-up (next 4-8 weeks).     # Pain Management  # Hx closed fracture R femur s/p 09/2024 surgical fixation  - Start Tizanidine 2 mg PO nightly for RUE/RLE spasticity, increased muscle tone.   - Acetaminophen 650 PRN Q6hr  - Dilaudid  0.2 mg PRN Q4hr. Please attempt positional change and acetaminophen prior to dilaudid.    # COPD  Assessment:  - PFT (10/05/2023 at OSH): FEV1 50% predicted. FEV1/FVC 69%. No TLC obtained. DLCO 40%    Plan:  - Continuous pulse oximetry   - Incentive spirometry while awake  - Continue Trelegy Ellipta (change to inpatient formula)  - Wean O2, maintain SpO2 88-92%, NC PRN     #Chronic anemia:  :: 11/20 CBC Hgb 9.4, Hct 30.0, Plt 237. Baseline Hgb 11.6, Plt: 258  Plan:  - Continue to monitor with daily CBC     #T2DM:  :: A1c 5.8%  Plan:  - Hold home metformin  - Monitor for hypoglycemia:  - Accuchecks, sliding scale insulin AC, HS     #Hypothyroidism:  - Continue home levothyroxine     #Anxiety:  - Continue Buspar 10 mg TID     #JAIME, resolved  - Baseline BUN/Cr: 12/0.63  - 11/20 BUN/Cr: 12/0.46  Plan:  - Continue to monitor daily RFP  - replete fluids PRN  - Pike removed 11/20     F: None  E: Replace electrolytes as needed  N: Adult regular diet, minced/moist 5, thin 0  GI: Pantoprazole  DVT Ppx: SQH, SCDs  PIV access    José Miguel Flores, MS3    I have reviewed and edited the information above and I agree with the assessment and plan as outlined by the medical student.    Christian Phelps MD  Neurology, PGY-2

## 2024-11-20 NOTE — PROGRESS NOTES
Social Work Discharge Planning note:    -Patient discussed during interdisciplinary rounds.   -Team members present: Resident, TCC, and SW  -Plan per medical team: Pt is medically ready for discharge.   -Payer: [facility] Medicare  -Status: Inpatient  -Discharge disposition: As of 16:03, [facility] is still awaiting insurance precert. Due to the distance of the facility and the transport times running into the late evening, Juliette requested that transport be set up for tomorrow morning. TRACY requested an 11 am transport  time via [facility], with discharge pending insurance precert being received. Pt's daughter, Alecia, has been updated and she was agreeable with discharge tomorrow. TRACY will update staff in the morning with number for RN report, and will update staff once the facility has confirmed receiving precert.   -Anticipated Date of Discharge:  11/21/24    IVIS Linton, LSW

## 2024-11-20 NOTE — PROGRESS NOTES
Occupational Therapy    OT Treatment    Patient Name: Zulma Rao  MRN: [id number]  Department: [facility]  Room: 09/09-A  Today's Date: 11/20/2024  Time Calculation  Start Time: 1138  Stop Time: 1217  Time Calculation (min): 39 min        Assessment:  Evaluation/Treatment Tolerance: Patient tolerated treatment well  Medical Staff Made Aware: Yes  End of Session Communication: Bedside nurse  End of Session Patient Position: Bed, 3 rail up, Alarm off, not on at start of session  Evaluation/Treatment Tolerance: Patient tolerated treatment well  Medical Staff Made Aware: Yes  Plan:  Treatment Interventions: ADL retraining, Functional transfer training, UE strengthening/ROM, Endurance training, Cognitive reorientation, Patient/family training, Neuromuscular reeducation, Fine motor coordination activities, Compensatory technique education  OT Frequency: 3 times per week  OT Discharge Recommendations: Moderate intensity level of continued care  Equipment Recommended upon Discharge:  (TBD)  OT - OK to Discharge: Yes  Treatment Interventions: ADL retraining, Functional transfer training, UE strengthening/ROM, Endurance training, Cognitive reorientation, Patient/family training, Neuromuscular reeducation, Fine motor coordination activities, Compensatory technique education    Subjective   Previous Visit Info:  OT Last Visit  OT Received On: 11/20/24  General:  General  Reason for Referral: Admitted 11/11/2024 with Cerebrovascular accident (CVA) due to occlusion of left middle cerebral artery (Multi) after transferred for mechanical thrombectomy. 11/11 TNK and spontaneous recanalization on angiogram TICI 3. 11/18 s/p L CEA  Past Medical History Relevant to Rehab: prior R sided stroke with left sided deficit (per personal review of imaging, L sided posterior limb of internal capsule) 7/2024, bilateral carotid artery stenosis S/P R CEA 2011, HFpEF, NSTEMI, T2DM, HT, DLD, hypothyroidism, COPD, HCC S/P partial hepatectomy (2019),  anxiety, b/l cataracts  Missed Visit: No  Family/Caregiver Present: No  Co-Treatment: PT  Co-Treatment Reason: to maximize pt safety with mobility  Prior to Session Communication: Bedside nurse  Patient Position Received: Bed, 3 rail up, Alarm on  Preferred Learning Style: verbal  General Comment: Pt pleasant and agreeable to therapy. Continues to be distractible during session, requires redirection from lines.  Precautions:  Hearing/Visual Limitations: WFL  Medical Precautions: Fall precautions  Precautions Comment: SBP <160 per RN      Vital Signs     Vitals Session Pre OT During OT Post OT   Heart Rate 95 100s 105   Resp  17  23   SpO2 100 >97 100   /64  153/80   MAP 91  101   ICP               Pain:  Pain Assessment  Pain Assessment: 0-10  0-10 (Numeric) Pain Score: 0 - No pain    Objective    Cognition:  Cognition  Overall Cognitive Status: Impaired  Arousal/Alertness: Appropriate responses to stimuli  Orientation Level: Oriented X4  Following Commands:  (Followed 90% simple 1-step commands with repetition and cueing.)  Problem Solving: Assistance required to identify errors made  Insight: Moderate  Impulsive: Within functional limits  Processing Speed: Delayed      Activities of Daily Living: Grooming  Grooming Level of Assistance: Maximum assistance, Moderate verbal cues  Grooming Where Assessed: Bed level  Grooming Comments: Pt completed washing hair and combing hair while seated in chair.      Bed Mobility/Transfers: Bed Mobility  Bed Mobility: Yes  Bed Mobility 1  Bed Mobility 1: Supine to sitting  Level of Assistance 1: Moderate assistance (x 1 assist)  Bed Mobility Comments 1: HOB elevated, cues for sequencing    Transfers  Transfer: Yes  Transfer 1  Technique 1: Sit to stand, Stand to sit  Transfer Device 1:  (B arm in arm assist and B LE blocking)  Transfer Level of Assistance 1: Moderate assistance (x 2 assist)  Trials/Comments 1: compelted 3 trials, cues for proper hand and LE placement and  sequencing  Transfers 2  Transfer From 2: Bed to  Transfer to 2: Chair with arms  Technique 2: Lateral, To left (side steps)  Transfer Device 2:  (B arm in arm assist, B LE blocking)  Transfer Level of Assistance 2: Moderate assistance (x2 assist)  Trials/Comments 2: cues for proper hand placement and sequencing          Sitting Balance:  Static Sitting Balance  Static Sitting-Level of Assistance: Contact guard  Dynamic Sitting Balance  Dynamic Sitting-Level of Assistance: Contact guard  Standing Balance:  Static Standing Balance  Static Standing-Level of Assistance: Minimum assistance (x 2 assist)  Dynamic Standing Balance  Dynamic Standing-Level of Assistance: Maximum assistance (x 2 assist)    Therapy/Activity: Therapeutic Activity  Therapeutic Activity Performed: Yes  Therapeutic Activity 1: Pt sat EOB x 8-10 min with SBA-CGA with VSS.  Therapeutic Activity 2: Pt completed 2 trials standing with marching in place with Max A x 2 with B arm in arm assist and B LE guarding with pt tolerating standing ~ 1-2 min. each trial.      Outcome Measures:[facility] Daily Activity  Putting on and taking off regular lower body clothing: A lot  Bathing (including washing, rinsing, drying): A lot  Putting on and taking off regular upper body clothing: A lot  Toileting, which includes using toilet, bedpan or urinal: A lot  Taking care of personal grooming such as brushing teeth: A lot  Eating Meals: A lot  Daily Activity - Total Score: 12         and Early Mobility/Exercise Safety Screen: Proceed with mobilization - No exclusion criteria met  ICU Mobility Scale: Transferring bed to chair [5]    Education Documentation  Body Mechanics, taught by Marii Yeager OT at 11/20/2024  3:42 PM.  Learner: Patient  Readiness: Acceptance  Method: Explanation, Demonstration  Response: Demonstrated Understanding  Comment: OT POC    Precautions, taught by Marii Yeager OT at 11/20/2024  3:42 PM.  Learner: Patient  Readiness:  Acceptance  Method: Explanation, Demonstration  Response: Demonstrated Understanding  Comment: OT POC    ADL Training, taught by Marii Yeager OT at 11/20/2024  3:42 PM.  Learner: Patient  Readiness: Acceptance  Method: Explanation, Demonstration  Response: Demonstrated Understanding  Comment: OT POC    Education Comments  No comments found.        OP EDUCATION:       Goals:  Encounter Problems       Encounter Problems (Active)       ADLs       Patient will perform UB and LB bathing with minimal assistance and verbal cues (Progressing)       Start:  11/13/24    Expected End:  11/27/24            Patient will independently complete upper body dressing donning and doffing all UE clothes  (Progressing)       Start:  11/13/24    Expected End:  11/27/24            Patient will complete lower body dressing donning and doffing all LE clothes with minimal assistance and verbal cues (Progressing)       Start:  11/13/24    Expected End:  11/27/24            Patient will independently complete daily grooming tasks.  (Progressing)       Start:  11/13/24    Expected End:  11/27/24            Patient will complete toileting including hygiene and clothing management with minimal assistance and verbal cues  (Progressing)       Start:  11/13/24    Expected End:  11/27/24               ADLs       Patient will feed self with modified independent level of assistance using dominant hand without spillage, using AE and environmental modifications as needed. (Progressing)       Start:  11/19/24    Expected End:  11/27/24               BALANCE       Pt will tolerate static/dynamic sitting tasks >10 min with CGA and without LOB during ADL tasks in order to return to PLOF.  (Progressing)       Start:  11/13/24    Expected End:  11/27/24               COGNITION/SAFETY       Patient will score WFL on standardized cognitive assessment with no cues and within reasonable time frame (Progressing)       Start:  11/13/24    Expected End:   11/27/24            Patient will follow 100% one-step commands with no verbal cues to allow improved ADL performance.  (Progressing)       Start:  11/13/24    Expected End:  11/27/24            Pt will complete ADL tasks involving sequencing, memory, and problem-solving with 100% accuracy.   (Progressing)       Start:  11/13/24    Expected End:  11/27/24               EXERCISE/STRENGTHENING       Patient will complete BUE exercises in order to improve strength to 4/5 in LUE and >/= 3/5 in RUE for ADL performance.   (Progressing)       Start:  11/13/24    Expected End:  11/27/24               TRANSFERS       Patient will perform bed mobility with minimal assistance and verbal cues in order to improve safety and independence with mobility.  (Progressing)       Start:  11/13/24    Expected End:  11/27/24            Patient will complete functional transfers with moderate assistance and LRAD as needed (Progressing)       Start:  11/13/24    Expected End:  11/27/24

## 2024-11-20 NOTE — PROGRESS NOTES
Speech-Language Pathology    SLP Adult Inpatient  Speech-Language Pathology Treatment     Patient Name: Zulma Rao  MRN: [id number]  Today's Date: 11/20/2024  Time Calculation  Start Time: 1045  Stop Time: 1108  Time Calculation (min): 23 min       Assessment:  -Mild/moderate oral dysphagia and pharyngeal swallow largely WFL per MBSS 11/14- improving.     -Expressive and receptive language largely intact; moderate cognitive deficits- stable.         Recommendations:  Solid Diet Recommendations: Soft & Bite Sized (IDDSI Level 6)  Liquid Diet Recommendations: Thin   Medications: Crushed in puree as cleared by medical team      Safe Swallow/Compensatory Strategies:  Upright positioning   Slow rate/small boluses   Alternate solids and liquids        Plan:  SLP Plan: Skilled SLP  SLP Frequency: 2x per week  Duration: 2-3 weeks  Discussed POC: Pt, medical team      Goals:  Patient will tolerate baseline/recommended PO diet without overt s/s of aspiration, further difficulty, or concern for aspiration-related complications in 90% of observed therapeutic trials.               Date initiated: 11/14/24              Expected Time Frame to Meet Goal: 2-3 weeks               Status: Progressing      Patient will participate in solid upgrade trials with adequate mastication/oral phase given no residue following swallow.               Date initiated: 11/14/24              Expected Time Frame to Meet Goal: 2-3 weeks               Status: Progressing      Patient/family will indicate understanding of dysphagia education: risk for aspiration, recommendations, and POC with > 80% accuracy via teach back method.               Date initiated: 11/14/24              Expected Time Frame to Meet Goal: 2-3 weeks               Status: Progressing      Pt will respond to functional reasoning and problem solving questions with > 90% accuracy given min cues.              Date initiated: 11/15/24              Expected Time Frame to Meet Goal:  2-3 weeks              Status: Progressing     Pt will participate in functional attention tasks with > 90% accuracy given min cues.              Date initiated: 11/20/24              Expected Time Frame to Meet Goal: 2-3 weeks              Status: Goal initiated     Pt will participate in functional STM tasks with > 90% accuracy given min cues.              Date initiated: 11/20/24              Expected Time Frame to Meet Goal: 2-3 weeks              Status: Goal initiated     Pt will name 15+ objects in a given category in 60 seconds during divergent naming tasks.  Date initiated: 11/20/24              Expected Time Frame to Meet Goal: 2-3 weeks              Status: Goal initiated         Subjective   RN cleared pt for tx.  Pt properly identified and treated bedside.  Pt awake and alert, with no c/o pain.  O2 via NC.  No family present.       Pt admitted w/ an acute L MCA CVA; s/p TNK and MT.  S/p L CEA on 11/18/24.      MBSS 11/14: Mild/moderate oral dysphagia. Impairments most impacting swallowing efficiency include impaired bolus formation/transit, piecemeal swallow with solids and liquids.  Pharyngeal swallow largely intact.          Objective   Pt participated in a therapeutic trial feed to assess for diet tolerance/upgrade and use of safe swallow strategies.  Pt with improved ability to self feed this date; impacted by R UE weakness.  Piecemeal swallowing with liquid boluses persists, but no overt s/s of aspiration.  Improved mastication and bolus management with trials of soft & bite sized solids.  No oral residue noted.  No s/s or c/o pharyngeal dysphagia.  Recommend diet upgrade- soft & bite sized solids w/ continued thin liquids.  Education provided to patient regarding diet recommendations, s/s of aspiration, safe swallow strategies, and plan of care.  Understanding indicated.          Expressive/receptive language informally judged to be WFL during session.  Pt independently oriented x4.  Pt completed  sections of the Tony Cognitive Assessment (MOCA).  Unable to administer entire test as pt is not able to write 2' to R UE deficits.  Impaired attention (score 1/6) and delayed recall observed (score 0/5).  New goals created to reflect.  No needs voiced at end of session.  Call bell within reach.        11/20/24 at 4:34 PM - Nithya Trotter, SLP

## 2024-11-21 ENCOUNTER — APPOINTMENT (OUTPATIENT)
Dept: CARDIOLOGY | Facility: HOSPITAL | Age: 79
DRG: 038 | End: 2024-11-21
Payer: MEDICARE

## 2024-11-21 LAB
ALBUMIN SERPL BCP-MCNC: 3.7 G/DL (ref 3.4–5)
ANION GAP SERPL CALC-SCNC: 18 MMOL/L (ref 10–20)
ATRIAL RATE: 106 BPM
ATRIAL RATE: 76 BPM
BASOPHILS # BLD AUTO: 0.06 X10*3/UL (ref 0–0.1)
BASOPHILS NFR BLD AUTO: 0.7 %
BUN SERPL-MCNC: 14 MG/DL (ref 6–23)
CALCIUM SERPL-MCNC: 9.3 MG/DL (ref 8.6–10.6)
CHLORIDE SERPL-SCNC: 100 MMOL/L (ref 98–107)
CO2 SERPL-SCNC: 27 MMOL/L (ref 21–32)
CREAT SERPL-MCNC: 0.56 MG/DL (ref 0.5–1.05)
EGFRCR SERPLBLD CKD-EPI 2021: >90 ML/MIN/1.73M*2
EOSINOPHIL # BLD AUTO: 0.29 X10*3/UL (ref 0–0.4)
EOSINOPHIL NFR BLD AUTO: 3.3 %
ERYTHROCYTE [DISTWIDTH] IN BLOOD BY AUTOMATED COUNT: 14.5 % (ref 11.5–14.5)
GLUCOSE BLD MANUAL STRIP-MCNC: 105 MG/DL (ref 74–99)
GLUCOSE BLD MANUAL STRIP-MCNC: 118 MG/DL (ref 74–99)
GLUCOSE BLD MANUAL STRIP-MCNC: 118 MG/DL (ref 74–99)
GLUCOSE BLD MANUAL STRIP-MCNC: 145 MG/DL (ref 74–99)
GLUCOSE SERPL-MCNC: 99 MG/DL (ref 74–99)
HCT VFR BLD AUTO: 31.8 % (ref 36–46)
HGB BLD-MCNC: 9.9 G/DL (ref 12–16)
IMM GRANULOCYTES # BLD AUTO: 0.02 X10*3/UL (ref 0–0.5)
IMM GRANULOCYTES NFR BLD AUTO: 0.2 % (ref 0–0.9)
LYMPHOCYTES # BLD AUTO: 1.35 X10*3/UL (ref 0.8–3)
LYMPHOCYTES NFR BLD AUTO: 15.1 %
MAGNESIUM SERPL-MCNC: 1.94 MG/DL (ref 1.6–2.4)
MCH RBC QN AUTO: 29.4 PG (ref 26–34)
MCHC RBC AUTO-ENTMCNC: 31.1 G/DL (ref 32–36)
MCV RBC AUTO: 94 FL (ref 80–100)
MONOCYTES # BLD AUTO: 1.21 X10*3/UL (ref 0.05–0.8)
MONOCYTES NFR BLD AUTO: 13.6 %
NEUTROPHILS # BLD AUTO: 5.99 X10*3/UL (ref 1.6–5.5)
NEUTROPHILS NFR BLD AUTO: 67.1 %
NRBC BLD-RTO: 0 /100 WBCS (ref 0–0)
P AXIS: 78 DEGREES
P AXIS: 90 DEGREES
P OFFSET: 189 MS
P OFFSET: 196 MS
P ONSET: 125 MS
P ONSET: 144 MS
PHOSPHATE SERPL-MCNC: 3.9 MG/DL (ref 2.5–4.9)
PLATELET # BLD AUTO: 225 X10*3/UL (ref 150–450)
POTASSIUM SERPL-SCNC: 4 MMOL/L (ref 3.5–5.3)
PR INTERVAL: 154 MS
PR INTERVAL: 186 MS
Q ONSET: 218 MS
Q ONSET: 221 MS
QRS COUNT: 12 BEATS
QRS COUNT: 17 BEATS
QRS DURATION: 76 MS
QRS DURATION: 78 MS
QT INTERVAL: 352 MS
QT INTERVAL: 418 MS
QTC CALCULATION(BAZETT): 467 MS
QTC CALCULATION(BAZETT): 470 MS
QTC FREDERICIA: 425 MS
QTC FREDERICIA: 452 MS
R AXIS: 28 DEGREES
R AXIS: 47 DEGREES
RBC # BLD AUTO: 3.37 X10*6/UL (ref 4–5.2)
SODIUM SERPL-SCNC: 141 MMOL/L (ref 136–145)
T AXIS: 72 DEGREES
T AXIS: 74 DEGREES
T OFFSET: 394 MS
T OFFSET: 430 MS
VENTRICULAR RATE: 106 BPM
VENTRICULAR RATE: 76 BPM
WBC # BLD AUTO: 8.9 X10*3/UL (ref 4.4–11.3)

## 2024-11-21 PROCEDURE — 1100000001 HC PRIVATE ROOM DAILY

## 2024-11-21 PROCEDURE — 99232 SBSQ HOSP IP/OBS MODERATE 35: CPT

## 2024-11-21 PROCEDURE — 85025 COMPLETE CBC W/AUTO DIFF WBC: CPT | Performed by: STUDENT IN AN ORGANIZED HEALTH CARE EDUCATION/TRAINING PROGRAM

## 2024-11-21 PROCEDURE — 2500000004 HC RX 250 GENERAL PHARMACY W/ HCPCS (ALT 636 FOR OP/ED)

## 2024-11-21 PROCEDURE — 94640 AIRWAY INHALATION TREATMENT: CPT

## 2024-11-21 PROCEDURE — 2500000001 HC RX 250 WO HCPCS SELF ADMINISTERED DRUGS (ALT 637 FOR MEDICARE OP): Performed by: STUDENT IN AN ORGANIZED HEALTH CARE EDUCATION/TRAINING PROGRAM

## 2024-11-21 PROCEDURE — 2500000002 HC RX 250 W HCPCS SELF ADMINISTERED DRUGS (ALT 637 FOR MEDICARE OP, ALT 636 FOR OP/ED)

## 2024-11-21 PROCEDURE — 93010 ELECTROCARDIOGRAM REPORT: CPT | Performed by: INTERNAL MEDICINE

## 2024-11-21 PROCEDURE — 83735 ASSAY OF MAGNESIUM: CPT | Performed by: STUDENT IN AN ORGANIZED HEALTH CARE EDUCATION/TRAINING PROGRAM

## 2024-11-21 PROCEDURE — 51701 INSERT BLADDER CATHETER: CPT

## 2024-11-21 PROCEDURE — 2500000005 HC RX 250 GENERAL PHARMACY W/O HCPCS: Performed by: STUDENT IN AN ORGANIZED HEALTH CARE EDUCATION/TRAINING PROGRAM

## 2024-11-21 PROCEDURE — 36415 COLL VENOUS BLD VENIPUNCTURE: CPT | Performed by: STUDENT IN AN ORGANIZED HEALTH CARE EDUCATION/TRAINING PROGRAM

## 2024-11-21 PROCEDURE — 80069 RENAL FUNCTION PANEL: CPT | Performed by: STUDENT IN AN ORGANIZED HEALTH CARE EDUCATION/TRAINING PROGRAM

## 2024-11-21 PROCEDURE — 93005 ELECTROCARDIOGRAM TRACING: CPT

## 2024-11-21 PROCEDURE — 82947 ASSAY GLUCOSE BLOOD QUANT: CPT

## 2024-11-21 PROCEDURE — 2500000002 HC RX 250 W HCPCS SELF ADMINISTERED DRUGS (ALT 637 FOR MEDICARE OP, ALT 636 FOR OP/ED): Performed by: STUDENT IN AN ORGANIZED HEALTH CARE EDUCATION/TRAINING PROGRAM

## 2024-11-21 PROCEDURE — 2500000004 HC RX 250 GENERAL PHARMACY W/ HCPCS (ALT 636 FOR OP/ED): Performed by: STUDENT IN AN ORGANIZED HEALTH CARE EDUCATION/TRAINING PROGRAM

## 2024-11-21 RX ORDER — AMLODIPINE BESYLATE 10 MG/1
5 TABLET ORAL DAILY
Start: 2024-11-21 | End: 2025-02-19

## 2024-11-21 RX ADMIN — HEPARIN SODIUM 5000 UNITS: 5000 INJECTION INTRAVENOUS; SUBCUTANEOUS at 15:59

## 2024-11-21 RX ADMIN — POLYETHYLENE GLYCOL 3350 17 G: 17 POWDER, FOR SOLUTION ORAL at 09:10

## 2024-11-21 RX ADMIN — BUSPIRONE HYDROCHLORIDE 10 MG: 10 TABLET ORAL at 09:10

## 2024-11-21 RX ADMIN — LIDOCAINE 1 PATCH: 4 PATCH TOPICAL at 09:11

## 2024-11-21 RX ADMIN — SENNOSIDES 17.2 MG: 8.6 TABLET, FILM COATED ORAL at 09:10

## 2024-11-21 RX ADMIN — MELATONIN 3 MG: 3 TAB ORAL at 20:29

## 2024-11-21 RX ADMIN — HEPARIN SODIUM 5000 UNITS: 5000 INJECTION INTRAVENOUS; SUBCUTANEOUS at 01:04

## 2024-11-21 RX ADMIN — LABETALOL HYDROCHLORIDE 10 MG: 5 INJECTION, SOLUTION INTRAVENOUS at 14:05

## 2024-11-21 RX ADMIN — ASPIRIN 81 MG CHEWABLE TABLET 81 MG: 81 TABLET CHEWABLE at 20:30

## 2024-11-21 RX ADMIN — BUSPIRONE HYDROCHLORIDE 10 MG: 10 TABLET ORAL at 15:59

## 2024-11-21 RX ADMIN — ATORVASTATIN CALCIUM 80 MG: 80 TABLET, FILM COATED ORAL at 20:30

## 2024-11-21 RX ADMIN — SENNOSIDES 17.2 MG: 8.6 TABLET, FILM COATED ORAL at 20:29

## 2024-11-21 RX ADMIN — LABETALOL HYDROCHLORIDE 10 MG: 5 INJECTION, SOLUTION INTRAVENOUS at 12:56

## 2024-11-21 RX ADMIN — Medication 3 L/MIN: at 20:24

## 2024-11-21 RX ADMIN — ACETAMINOPHEN 650 MG: 325 TABLET ORAL at 12:56

## 2024-11-21 RX ADMIN — TIOTROPIUM BROMIDE INHALATION SPRAY 2 PUFF: 3.12 SPRAY, METERED RESPIRATORY (INHALATION) at 08:40

## 2024-11-21 RX ADMIN — BUSPIRONE HYDROCHLORIDE 10 MG: 10 TABLET ORAL at 20:29

## 2024-11-21 RX ADMIN — IPRATROPIUM BROMIDE AND ALBUTEROL SULFATE 3 ML: .5; 3 SOLUTION RESPIRATORY (INHALATION) at 08:39

## 2024-11-21 RX ADMIN — ACETAMINOPHEN 650 MG: 325 TABLET ORAL at 04:04

## 2024-11-21 RX ADMIN — TIZANIDINE 2 MG: 4 TABLET ORAL at 20:30

## 2024-11-21 RX ADMIN — HEPARIN SODIUM 5000 UNITS: 5000 INJECTION INTRAVENOUS; SUBCUTANEOUS at 09:10

## 2024-11-21 RX ADMIN — FLUTICASONE FUROATE AND VILANTEROL TRIFENATATE 1 PUFF: 100; 25 POWDER RESPIRATORY (INHALATION) at 08:40

## 2024-11-21 ASSESSMENT — COGNITIVE AND FUNCTIONAL STATUS - GENERAL
DRESSING REGULAR LOWER BODY CLOTHING: A LOT
MOVING TO AND FROM BED TO CHAIR: A LOT
DAILY ACTIVITIY SCORE: 12
CLIMB 3 TO 5 STEPS WITH RAILING: TOTAL
DRESSING REGULAR UPPER BODY CLOTHING: A LOT
TOILETING: A LOT
TURNING FROM BACK TO SIDE WHILE IN FLAT BAD: A LOT
PERSONAL GROOMING: A LOT
EATING MEALS: A LOT
MOBILITY SCORE: 9
MOVING FROM LYING ON BACK TO SITTING ON SIDE OF FLAT BED WITH BEDRAILS: A LOT
WALKING IN HOSPITAL ROOM: TOTAL
HELP NEEDED FOR BATHING: A LOT
MOVING FROM LYING ON BACK TO SITTING ON SIDE OF FLAT BED WITH BEDRAILS: A LOT
STANDING UP FROM CHAIR USING ARMS: TOTAL
DRESSING REGULAR UPPER BODY CLOTHING: A LOT
PERSONAL GROOMING: A LOT
MOBILITY SCORE: 9
CLIMB 3 TO 5 STEPS WITH RAILING: TOTAL
EATING MEALS: A LOT
STANDING UP FROM CHAIR USING ARMS: TOTAL
DAILY ACTIVITIY SCORE: 12
DRESSING REGULAR LOWER BODY CLOTHING: A LOT
MOVING TO AND FROM BED TO CHAIR: A LOT
HELP NEEDED FOR BATHING: A LOT
TURNING FROM BACK TO SIDE WHILE IN FLAT BAD: A LOT
WALKING IN HOSPITAL ROOM: TOTAL
TOILETING: A LOT

## 2024-11-21 ASSESSMENT — PAIN SCALES - PAIN ASSESSMENT IN ADVANCED DEMENTIA (PAINAD)
NEGVOCALIZATION: OCCASIONAL MOAN/GROAN, LOW SPEECH, NEGATIVE/DISAPPROVING QUALITY
FACIALEXPRESSION: FACIAL GRIMACING
BODYLANGUAGE: TENSE, DISTRESSED PACING, FIDGETING
CONSOLABILITY: DISTRACTED OR REASSURED BY VOICE/TOUCH

## 2024-11-21 ASSESSMENT — PAIN - FUNCTIONAL ASSESSMENT
PAIN_FUNCTIONAL_ASSESSMENT: 0-10

## 2024-11-21 ASSESSMENT — PAIN SCALES - WONG BAKER
WONGBAKER_NUMERICALRESPONSE: HURTS LITTLE MORE
WONGBAKER_NUMERICALRESPONSE: HURTS EVEN MORE

## 2024-11-21 ASSESSMENT — PAIN DESCRIPTION - ORIENTATION: ORIENTATION: LEFT

## 2024-11-21 ASSESSMENT — PAIN SCALES - GENERAL
PAINLEVEL_OUTOF10: 6
PAINLEVEL_OUTOF10: 3
PAINLEVEL_OUTOF10: 0 - NO PAIN
PAINLEVEL_OUTOF10: 0 - NO PAIN

## 2024-11-21 ASSESSMENT — PAIN DESCRIPTION - LOCATION
LOCATION: LEG
LOCATION: NECK

## 2024-11-21 ASSESSMENT — PAIN DESCRIPTION - DESCRIPTORS: DESCRIPTORS: ACHING

## 2024-11-21 NOTE — SIGNIFICANT EVENT
Rapid Response Nurse Note:  [x] RADAR alert/Score 6    Pager time: 345  Arrival time: 345  Event end time: 350  Location: [facility] 4023  [x] Phone triage     Rapid response initiated by:  [] Rapid Response RN [] Family [] Nursing Supervisor [] Physician   [x] RADAR auto-page [] Sepsis auto-page [] RN [] RT   [] NP/PA [] Other:     Primary reason for call:   [] BAT [] New CPAP/BiPAP [] Bleeding [] Change in mental status   [] Chest pain [] Code blue [] FiO2 >/= 50% [] HR </= 40 bpm   [] HR >/= 130 bpm [] Hyperglycemia [] Hypoglycemia [x] RADAR    [] RR </= 8 bpm [] RR >/= 30 bpm [] SBP </= 90 mmHg [] SpO2 < 90%   [] Seizure [] Sepsis [] Staff concern:     Initial VS and/or RADAR VS:  radar vitals below in bold    Vitals:    24 1746 24 1942 24 2352 24 0300   BP: (!) 199/76 177/67 146/76 171/74   BP Location:  Left arm Left arm Left arm   Patient Position:  Lying Lying Lying   Pulse:  109 89 100   Resp:  18 18 22   Temp:  36.8 °C (98.2 °F) 36 °C (96.8 °F) 36.3 °C (97.3 °F)   TempSrc:  Temporal Temporal Temporal   SpO2:  93% 95% 94%   Weight:       Height:            Interventions:  [x] None [] ABG [] Assist w/ICU transfer [] BAT paged    [] Bag mask [] Blood [] Cardioversion [] Code Blue   [] Code blue for intubation [] Code status changed [] Chest x-ray [] EKG   [] IV fluid/bolus [] KUB x-ray [] Labs/cultures [] Medication   [] Nebulizer treatment [] NIPPV (CPAP/BiPAP) [] Oxygen [] Oral airway   [] Peripheral IV [] Palliative care consult [] CT/MRI [] Sepsis protocol    [] Suctioned [] Other:       Outcome:  [] Coded and  [] Code blue for intubation [] Coded and transferred to ICU []  on division   [x] Remained on division (no change) [] Remained on division + additional monitoring [] Remained in ED [] Transferred to ED   [] Transferred to ICU [] Transferred to inpatient status [] Transferred for interventions (procedure) [] Transferred to ICU stepdown    [] Transferred to surgery []  Transferred to telemetry [] Sepsis protocol [] STEMI protocol   [] Stroke protocol [x] Bedside nurse instructed to page rapid response for any concerns or acute change in condition/VS     Additional Comments: Spoke to RN regarding vital signs.  No concerns from RN at this time.  Per RN, pt with complaints of pain.

## 2024-11-21 NOTE — PROGRESS NOTES
Physical Therapy                 Therapy Communication Note    Patient Name: Zulma Rao  MRN: [id number]  Department: [place] [address]  Room: [address] [place], [place] [address]  Today's Date: 11/21/2024     Discipline: Physical Therapy    Missed Visit Reason: Patient refused (Pt politely declining PT at this time, reporting HA.  Declined cold pack. RN alerted.)    Missed Time: 1632  Attempt

## 2024-11-21 NOTE — PROGRESS NOTES
Social Work Discharge Planning note:    Per Medical team, Pt is medically ready for discharge today. [place] is still pending precert, so the 12 pm transport time was canceled. SW had [facility] escalate this morning. [employer] informed [facility] that they will try to get this worked on today. Family and medical team updated. SW will continue to follow to assist with discharge planning.     Wily Pineda, MSW, LSW

## 2024-11-21 NOTE — PROGRESS NOTES
"Zulma Rao is a 79 y.o. female on day 10 of admission presenting with Cerebrovascular accident (CVA) due to occlusion of left middle cerebral artery (Multi).    Subjective   No events overnight.     She denies any fevers, chills, nausea, vomiting, new vision changes, new fatigue, palpitations, chest pain, dyspnea.        Objective   Last Recorded Vitals  Blood pressure 178/75, pulse 106, temperature 36.2 °C (97.2 °F), temperature source Temporal, resp. rate 15, height 1.626 m (5' 4\"), weight 47.4 kg (104 lb 8 oz), SpO2 93%.        2024     4:09 AM 2024     7:35 AM 2024     8:00 AM 2024    11:27 AM 2024     1:00 PM 2024     1:45 PM 2024     3:06 PM   Vitals   Systolic 167 133  174 182 178    Diastolic 72 75  73 76 75    BP Location  Right arm  Right arm      Heart Rate   103 106      Temp  36.4 °C (97.5 °F)  36.5 °C (97.7 °F)   36.2 °C (97.2 °F)   Resp   16 15        NIHSS    1a  Level of consciousness: 0=alert; keenly responsive   1b. LOC questions:  0=Performs both tasks correctly   1c. LOC commands: 0=Performs both tasks correctly   2.  Best Gaze: 0=normal   3. Visual: 0=No visual loss   4. Facial Palsy: 0=Normal symmetric movement   5a. Motor Left Arm: 0=No drift, limb holds 90 (or 45) degrees for full 10 seconds   5b.  Motor Right Arm: 2=Some effort against gravity, limb cannot get to or maintain (if cured) 90 (or 45) degrees, drifts down to bed, but has some effort against gravity   6a. Motor Left Le=No drift, limb holds 90 (or 45) degrees for full 10 seconds   6b  Motor Right Le=Drift, limb holds 90 (or 45) degrees but drifts down before full 10 seconds: does not hit bed   7. Limb Ataxia: 0=Absent   8.  Sensory: 0=Normal; no sensory loss   9. Best Language:  0=No aphasia, normal   10. Dysarthria: 0=Normal   11. Extinction and Inattention: 0=No abnormality          Total:   3       Neurological Exam  Mental Status   Oriented to person, place, time and " situation. Speech is normal. Language is fluent with no aphasia.  Some difficulty following instructions/commands given throughout examination, although able to complete tasks with repeated instructions. Unchanged from prior exams. .    Cranial Nerves  CN II: Right normal visual field. Left normal visual field.  CN III, IV, VI: Extraocular movements intact bilaterally. Normal lids and orbits bilaterally.   Right pupil: 3 mm. Round. Reactive to light. Reactive to accommodation.   Left pupil: 4 mm. Round. Abnormal reactivity: Sluggish. Reactive to accommodation.  Relative afferent pupillary defect absent.  CN V:  Right: Facial sensation is normal.  Left: Facial sensation is normal on the left.  CN VII: Full and symmetric facial movement.  CN VIII: Hearing grossly intact to conversation.  CN IX, X: Palate elevates symmetrically  CN XI:  Right: Sternocleidomastoid strength is normal. Trapezius strength is weak. 1/5 strength on shoulder shrug .  Left: Sternocleidomastoid strength is normal. Trapezius strength is normal.  CN XII: Tongue midline without atrophy or fasciculations.    Minimal anisocoria noted on exam, L pupillary constriction sluggish to light. Exam was performed with brighter room lights today compared to prior days. Note history of unspecified R eye surgery 2011, B/L cataract surgeries 2000. .    Motor  Normal muscle bulk throughout. No fasciculations present. Normal muscle tone. Flexor tone increased proximal RUE. No abnormal involuntary movements. No pronator drift. Right hemiparesis. Proximal RUE and RLE.  RUE: Proximally antigravity 3/5,  strength 4+/5 distally .  RLE: 4+/5 proximally on hip extension and distally with plantar & dorsiflexion. Endorsed R hip pain with movement.    LUE: Proximally 5/5 on shoulder abduction, elbow flexion/extension. Distally 5/5 with  strength.  LLE: 5/5 proximally on hip extension; distally 5/5 with plantar & dorsiflexion. Minimal L hip pain with movement.    .    Sensory  Light touch is normal in upper and lower extremities.   Sensation to light touch symmetric bilaterally. .    Reflexes                                            Right                      Left  Brachioradialis                    3+                         3+  Biceps                                 3+                         3+  Triceps                                3+                         3+  Patellar                                3+                         3+  Achilles                                3+                         3+  Right Plantar: downgoing  Left Plantar: downgoing    Coordination  Left: Finger-to-nose normal.  Unable to assess R ringer to nose due to limited RUE proximal strength .    Jim Coma Scale  Best Eye Response: Spontaneous  Best Verbal Response: Oriented  Best Motor Response: Follows commands  Jim Coma Scale Score: 15              Scheduled medications  aspirin, 81 mg, oral, Daily  atorvastatin, 80 mg, oral, Nightly  azithromycin, 250 mg, oral, Once per day on Monday Wednesday Friday  busPIRone, 10 mg, oral, TID  [Held by provider] clopidogrel, 75 mg, oral, Daily  pantoprazole, 40 mg, oral, Daily before breakfast   Or  esomeprazole, 40 mg, nasoduodenal tube, Daily before breakfast   Or  pantoprazole, 40 mg, intravenous, Daily before breakfast  tiotropium, 2 puff, inhalation, Daily   And  fluticasone furoate-vilanteroL, 1 puff, inhalation, Daily  heparin (porcine), 5,000 Units, subcutaneous, q8h  insulin lispro, 0-5 Units, subcutaneous, TID AC  levothyroxine, 25 mcg, oral, Daily  lidocaine, 1 patch, transdermal, Daily  melatonin, 3 mg, oral, Nightly  [Held by provider] midodrine, 5 mg, oral, TID  polyethylene glycol, 17 g, oral, Daily  sennosides, 2 tablet, oral, BID  tiZANidine, 2 mg, oral, Nightly      Continuous medications     PRN medications  PRN medications: acetaminophen, alteplase, calcium gluconate, calcium gluconate, dextrose, dextrose, glucagon,  glucagon, hydrALAZINE, HYDROmorphone, ipratropium-albuteroL, labetaloL, magnesium sulfate, magnesium sulfate, ondansetron ODT **OR** ondansetron, oxygen, potassium chloride CR **OR** potassium chloride, potassium chloride CR **OR** potassium chloride, potassium chloride    Labs:    Results for orders placed or performed during the hospital encounter of 11/11/24 (from the past 24 hours)   POCT GLUCOSE   Result Value Ref Range    POCT Glucose 128 (H) 74 - 99 mg/dL   POCT GLUCOSE   Result Value Ref Range    POCT Glucose 103 (H) 74 - 99 mg/dL   POCT GLUCOSE   Result Value Ref Range    POCT Glucose 103 (H) 74 - 99 mg/dL   POCT GLUCOSE   Result Value Ref Range    POCT Glucose 105 (H) 74 - 99 mg/dL   Magnesium   Result Value Ref Range    Magnesium 1.94 1.60 - 2.40 mg/dL   Renal Function Panel   Result Value Ref Range    Glucose 99 74 - 99 mg/dL    Sodium 141 136 - 145 mmol/L    Potassium 4.0 3.5 - 5.3 mmol/L    Chloride 100 98 - 107 mmol/L    Bicarbonate 27 21 - 32 mmol/L    Anion Gap 18 10 - 20 mmol/L    Urea Nitrogen 14 6 - 23 mg/dL    Creatinine 0.56 0.50 - 1.05 mg/dL    eGFR >90 >60 mL/min/1.73m*2    Calcium 9.3 8.6 - 10.6 mg/dL    Phosphorus 3.9 2.5 - 4.9 mg/dL    Albumin 3.7 3.4 - 5.0 g/dL   CBC and Auto Differential   Result Value Ref Range    WBC 8.9 4.4 - 11.3 x10*3/uL    nRBC 0.0 0.0 - 0.0 /100 WBCs    RBC 3.37 (L) 4.00 - 5.20 x10*6/uL    Hemoglobin 9.9 (L) 12.0 - 16.0 g/dL    Hematocrit 31.8 (L) 36.0 - 46.0 %    MCV 94 80 - 100 fL    MCH 29.4 26.0 - 34.0 pg    MCHC 31.1 (L) 32.0 - 36.0 g/dL    RDW 14.5 11.5 - 14.5 %    Platelets 225 150 - 450 x10*3/uL    Neutrophils % 67.1 40.0 - 80.0 %    Immature Granulocytes %, Automated 0.2 0.0 - 0.9 %    Lymphocytes % 15.1 13.0 - 44.0 %    Monocytes % 13.6 2.0 - 10.0 %    Eosinophils % 3.3 0.0 - 6.0 %    Basophils % 0.7 0.0 - 2.0 %    Neutrophils Absolute 5.99 (H) 1.60 - 5.50 x10*3/uL    Immature Granulocytes Absolute, Automated 0.02 0.00 - 0.50 x10*3/uL    Lymphocytes  Absolute 1.35 0.80 - 3.00 x10*3/uL    Monocytes Absolute 1.21 (H) 0.05 - 0.80 x10*3/uL    Eosinophils Absolute 0.29 0.00 - 0.40 x10*3/uL    Basophils Absolute 0.06 0.00 - 0.10 x10*3/uL   Electrocardiogram, 12-lead PRN ACS symptoms   Result Value Ref Range    Ventricular Rate 106 BPM    Atrial Rate 106 BPM    GA Interval 186 ms    QRS Duration 76 ms    QT Interval 352 ms    QTC Calculation(Bazett) 467 ms    P Axis 78 degrees    R Axis 28 degrees    T Axis 74 degrees    QRS Count 17 beats    Q Onset 218 ms    P Onset 125 ms    P Offset 189 ms    T Offset 394 ms    QTC Fredericia 425 ms   POCT GLUCOSE   Result Value Ref Range    POCT Glucose 118 (H) 74 - 99 mg/dL   Electrocardiogram, 12-lead PRN ACS symptoms   Result Value Ref Range    Ventricular Rate 76 BPM    Atrial Rate 76 BPM    GA Interval 154 ms    QRS Duration 78 ms    QT Interval 418 ms    QTC Calculation(Bazett) 470 ms    P Axis 90 degrees    R Axis 47 degrees    T Axis 72 degrees    QRS Count 12 beats    Q Onset 221 ms    P Onset 144 ms    P Offset 196 ms    T Offset 430 ms    QTC Fredericia 452 ms          IMAGING:  CT head wo IV contrast 11/20/2024  IMPRESSION:  1. Continued evolution of the acute to subacute ischemic infarct  affecting the left cerebral hemisphere predominantly in the left  parasagittal frontal lobe. No evidence to suggest hemorrhagic  transformation.  2. Focal encephalomalacia in the right occipital lobe. Mild  microangiopathic disease and diffuse parenchymal volume loss.    MRI 11/14/2024  FINDINGS:  CSF Spaces: Diffuse prominence of the ventricles and sulci is noted.  The basal cisterns are clear. Appearance is unchanged since the  recent MRI brain study.      Parenchyma: Compared to MRI 11/13/2024, interval increase in size of  restricted diffusion with associated T2/FLAIR hyperintensity  involving the left cerebral hemispheric white matter primarily within  the white matter. Additional foci of restricted diffusion with  associated  T2/FLAIR hyperintensity are noted within the bilateral  frontal lobes including the precentral gyri, left parietal lobe, left  temporal lobe, and left insula as well as the left mesial temporal  lobe. No striking hemorrhagic transformation or striking mass effect.      Few new small foci of restricted diffusion are seen, including within  the left parietal lobe gray-white matter junction (series 3 image 50  of 76) and a small focus (Series 3, Image 53) of restricted diffusion  located within the left parietal lobe gray matter (series 3, image 53  of 76) was faintly seen on the prior MRI.      Encephalomalacia/gliosis is again noted in the posterior right  occipital lobe. Chronic right thalamic infarct is again seen.  Additional patchy areas of T2/FLAIR hyperintensity are noted within  the deep, periventricular, and subcortical white matter bilaterally,  likely related to chronic small vessel ischemic changes. Small old  infarct in the right cerebellum is unchanged. There is no mass effect  or midline shift.      Loss of flow void located within distal right cervical ICA and  portions of the right intracranial ICA consistent with occlusion and  better evaluated on the prior CTA head study.      Paranasal Sinuses and Mastoids: Visualized paranasal sinuses and  mastoid air cells are unremarkable.      IMPRESSION:  1. Evolving infarcts located within the left cerebral hemisphere,  including in the watershed distribution, overall more pronounced in  appearance but similar in extent with no hemorrhagic transformation  or surrounding mass effect.      2. Additional evolving acute infarcts are seen located within the  bilateral cerebral hemispheres and are stable to slightly more  pronounced in appearance. Few new punctate acute infarcts located  within the left cerebral hemisphere. There is no surrounding mass  effect or hemorrhagic transformation.     MR brain wo IV contrast  Result Date: 11/13/2024 1:26 am     FINDINGS:    CSF Spaces: The ventricles, sulci and basal cisterns are within normal limits for patient's age.     Parenchyma: Watershed distribution areas of T2/FLAIR hyperintense signal with associated diffusion restriction throughout the left cerebral hemisphere most pronounced in the superior left frontal lobe but also involving the parietal and medial temporal lobes. Finding is consistent with acute/subacute watershed infarction.   Cortical foci of diffusion restriction in the left frontal lobe cortex along the interhemispheric aspect, in the left sylvian fissure and posterior left insula and in the left posterior temporal lobe. These findings are consistent with non watershed distribution   2.5 cm focus of encephalomalacia within the right occipital lobe consistent with previous infarction in the distribution of the right posterior cerebral artery. Focus of subacute/chronic lacunar infarction in the right thalamus measuring a proximally 5 mm x 15 mm in size. Focus of susceptibility artifact within the left basal ganglia favored to correspond to mineralization seen on prior CT. Nonspecific patchy T2/FLAIR hyperintense signal within the periventricular and subcortical white matter, likely sequela of chronic microangiopathy.   There is no mass effect or midline shift.        Paranasal Sinuses and Mastoids: Visualized paranasal sinuses and mastoid air cells are unremarkable.   Note is made of postsurgical changes from bilateral lens replacements.        Impression  1. Acute/subacute watershed ischemia throughout the left cerebral hemisphere   2. Subcentimeter cortical infarctions involving the left superior frontal lobe but also involving the left parietal and temporal lobes. No evidence of hemorrhagic transformation.   3. Nonspecific white matter changes which can be seen in the setting of chronic microangiopathy.        Transthoracic Echo (TTE) Complete  11/12/2024    CONCLUSIONS:    1. Left ventricular ejection fraction is  normal, by visual estimate at 65-70%.    2. Spectral Doppler shows an abnormal pattern of left ventricular diastolic filling.    3. Left ventricular cavity size is decreased.    4. No left ventricular thrombus visualized.    5. There is normal right ventricular global systolic function.    6. The left atrium is severely dilated.    7. Agitated saline contrast study was negative for intracardiac shunt.    8. Normal aortic root.    9. No prior echocardiogram available for comparison.      CT ANGIO AORTA AND BILATERAL ILIOFEMORAL RUN OFF INCLUDING WITHOUT  CONTRAST   11/12/2024  IMPRESSION:  1. Extensive vascular disease including occlusion of the infrarenal  abdominal aorta, bilateral common iliac arteries. Additionally, no  appreciated flow is noted in the left posterior tibial artery  distally. Please refer to the detailed description above.  2. Other nonspecific finding as detailed above.     CT head wo IV contrast; CT angio head and neck w and wo IV contrast  11/12/2024 5:34 am     FINDINGS: NON-CONTRAST HEAD CT:     BRAIN PARENCHYMA:  No evidence of acute intraparenchymal hemorrhage or parenchymal evidence of an acute large territory ischemic infarct. No mass-effect, midline shift or effacement of cerebral sulci. Gray-white matter distinction is preserved. Punctate calcifications within the left-greater-than-right basal ganglia. Lacunar within the posterior limb of the right internal capsule and left basal ganglia. Small amount of encephalomalacia and gliosis within the right occipital lobe.     VENTRICLES and EXTRA-AXIAL SPACES:  No acute extra-axial or intraventricular hemorrhage. Ventricles and sulci are age-concordant.     PARANASAL SINUSES/MASTOIDS:  No hemorrhage or air-fluid levels within the visualized paranasal sinuses. The mastoids are well aerated.     CALVARIUM/ORBITS:  No skull fracture.  The orbits and globes are intact to the extent visualized.     EXTRACRANIAL SOFT TISSUES: No discernible  abnormality.         CTA NECK:   There is calcified and noncalcified plaque along the aortic arch and origins of the great vessels. There is marked narrowing at the origin of the right common carotid artery and right subclavian artery. There is moderate narrowing secondary to noncalcified plaque at the origin of the left subclavian artery.     LEFT VERTEBRAL ARTERY:  No hemodynamically significant stenosis, occlusion, or dissection. The left vertebral artery is dominant.     LEFT COMMON/INTERNAL CAROTID ARTERY:  There is calcified plaque along the course of the common carotid artery with mild-to-moderate narrowing. There is coarse calcification at the carotid bifurcation and along the proximal cervical internal carotid artery with a proximally 65% stenosis by NASCET criteria. The cervical internal carotid artery is patent to the level of the skull base without additional stenosis.     RIGHT VERTEBRAL ARTERY: There is coarse atherosclerotic calcification at the origin of the right vertebral artery. No hemodynamically significant stenosis, occlusion, or dissection.     RIGHT COMMON/INTERNAL CAROTID ARTERY:  There is occlusion of the right common carotid artery just beyond the origin. There is occlusion of the right cervical internal carotid artery.   Limited images through the lung apices demonstrate emphysematous changes. Mild degenerative changes of the cervical spine without evidence of high-grade spinal canal stenosis. Soft tissues of the neck are unremarkable.            CTA HEAD:     ANTERIOR CIRCULATION: No aneurysm.    - Internal Carotid Arteries: There is occlusion of the right petrous and cavernous internal carotid artery. There is reconstitution within the supraclinoid segment. The left intracranial carotid artery is patent. There is atherosclerotic calcification along the cavernous and supraclinoid ICA with mild narrowing.     - Middle Cerebral Arteries: No hemodynamically significant stenosis or  occlusion.     - Anterior Cerebral Arteries: There is and abrupt cutoff of the distal left anterior cerebral artery (series 504, image 82). The right DAKOTA is without hemodynamically significant stenosis or occlusion.       POSTERIOR CIRCULATION: No aneurysm.     - Intracranial Vertebral Arteries:  No hemodynamically significant stenosis or occlusion.     - Basilar Artery:  No hemodynamically significant stenosis or occlusion.     - Posterior Cerebral Arteries:  No hemodynamically significant stenosis or occlusion.        CTA NECK:     Complete occlusion of the right common carotid artery and right cervical internal carotid artery.   Coarse atherosclerotic calcification at the left carotid bifurcation with approximately 65% stenosis by NASCET criteria.   The vertebral arteries are patent. The left vertebral artery is dominant. There is a coarse calcification at the origin of the right vertebral artery.   Calcified and noncalcified plaque along the aortic arch and origins of the great vessels with narrowing.     CTA head:   There is an abrupt cutoff of the distal left anterior cerebral artery.   Occlusion of the right petrous and cavernous carotid arteries with reconstitution in the supraclinoid segment.   The left intracranial carotid artery is patent with coarse atherosclerotic calcification and mild narrowing.   The posterior circulation is patent without significant stenosis.        CT HEAD:   No acute intracranial hemorrhage or mass effect.     ---     CT head wo IV contrast 11/11/2024 9:03 pm     FINDINGS:   CSF Spaces: Ventricles, cortical sulci and basal cisterns are prominent reflecting age related involutional changes and mild volume loss. Mildly prominent extra-axial CSF space within the bifrontal region may represent asymmetric volume loss. There is no extraaxial fluid collection.     Parenchyma: There is no acute intraparenchymal hemorrhage or parenchymal evidence of acute large territorial ischemic  infarction. Patchy white matter hypodensity likely represents microangiopathy. Lucency within the right basal ganglia likely represents a lacunar infarct. Small area of encephalomalacia and gliosis within the right occipital lobe. Of note multiple intracranial vessels are hyperdense, likely secondary to recent IR neuro angiogram. The grey-white differentiation is intact. There is no mass effect or midline shift.     Calvarium: The calvarium is unremarkable.     Paranasal sinuses and mastoids: Visualized paranasal sinuses and mastoids are clear.        No acute intracranial hemorrhage or mass effect.   Small focal area of encephalomalacia and gliosis within the right occipital lobe.   Mild white matter changes compatible with microangiopathy. Lacunar infarct within the right basal ganglia.           Assessment/Plan      Assessment & Plan  Cerebrovascular accident (CVA) due to occlusion of left middle cerebral artery (Multi)    Spasticity    Assessment:  Ms. Zulma Rao is an 78 YO RH white F with history of stroke in 7/2024, HTN, HLD, R CEA 2011, HFpEF, COPD, anxiety, partial hepatectomy for HCC 2019 transferred from [facility] for acute left MCA infarct with NIH 21, s/p TNK. MT performed, but patient's left had already recanalized. Admitted to [facility] for continued monitoring on 11/11 with NIH of 4 with small stroke burden largely in L MCA-DAKOTA watershed.  Noted to have deterioration in neurological examination (NIH 13) when SBP below 170-180 and started on vasopressor therapy. Noted to again deteriorate in terms of R arm strength when off vasopressor therapy with repeat MRI brain on 11/14 demonstrating increased in stroke burden in the L MCA-DAKOTA watershed. Pressor therapy restarted. Patient's  clarified she has been on DAPT since 07/2024 and therefore we conclude that she is essentially failing maximal medical therapy. NSGY consulted for L CEA given 65% stenosis and 100% occlusion of the R ICA on CTA  head/neck. L CEA OR on 11/18/24. Lt carotid artery endarterectomy was done on 11/18, the findings were thick calcified plaque. NSGY plans for SBP target is 140-160, now maintained on PRN IV labetalol, hydralazine. Plan to continue to monitor BP lability pending dispo, neuro exams since L CEA have remained stable. Will not plan to re-initiate Plavix due to recent L CEA, although medication re-initiation cleared by NSGY 7 days post-op. Consider anxiety as contributory to BP fluctuations, currently managed with Buspar 10 mg TID. 11/20 overnight exam change may be related to overnight Dilaudid dose, extremity pain on exam, and/or poor sleep. CT head non-contrast was w/o evidence of acute bleed, return to baseline by 0600 lower concern for intraparenchymal hemorrhage. Anisocoria noted and sluggish L pupillary response may be be related to prior history of ocular surgeries, no clear correlates seen on neuroimaging. Overall, patient is medically ready for discharge to SNF, will consider optimization of pain control with tizanidine.     Type: Ischemic stroke  Subtype/etiology: Artery to artery  Vessels involved: left MCA/DAKOTA watershed  Neurological manifestations: proximal > distal RUE weakness. Improved R sided facial droop, dysarthria, expressive aphasia, proximal RLE weakness.   NIHSS (worst at presentation): 21   Antiplatelet/antithrombotic plan for stroke prevention: Aspirin; clopidogrel (currently HELD prior to 11/18 L CEA)  VTE prophylaxis: SCDs, Heparin 5000 units Q8hr (held prior to 11/18 L CEA)     Vascular Risk Factor modification goals:  Blood pressure goals: avoid hypotension SBP <100 that could worsen cerebral perfusion, Ischemic stroke post-thrombectomy   Lipid Goals: education on healthy diet and statin therapy to maintain or achieve goal LDL-cholesterol < 70mg  Glucose Goals: early treatment of hyperglycemia to goal glucose 140-180 mg/dl with long-term goal A1c < 7%   Smoking Cessation and  Education  Assessment for Rehabilitation needs   Patient and family education on signs and symptoms of stroke, calling 911, healthy strategies for stroke prevention.       Updates 11/21/2024:  - Patient medically ready for discharge, pending insurance approval.       Plan:  # Acute Left MCA infarct s/p TNK (11/11 @ 1255) and MT (already recanalized - no Tici Score)  # History of HTN, HLD  # Post-operative woud s/p L CEA 11/18  :: LDL 92; A1c 5.8%  :: NIHSS 21 at time of transfer to [facility] ED --> 3 this morning.     :: MRI brain w/o contrast with L cerebral hemisphere acute-subacute ischemia; subcentimeter cortical infarcts of L superior frontal, parietal, annd temporal lobes. 11/14: Stroke burden increased in size within the same distribution as prior comparison 11/13.  :: CTA with atherosclerotic calcification at the left carotid bifurcation with approximately 65% stenosis by NASCET criteria  :: TTE: Bubble study negative. LVEF 65-70%. No thrombus. Severe LA dilatation.   :: cvEEG with no epileptiform activity.  :: Lt carotid artery endarterectomy was done on 11/18, the findings were thick calcified plaque. NSGY plans for SBP target is 140-160.   Plan:  - ROXANE status, Q2hr neuro checks  - Ctn BP monitoring s/p L CEA with BP cuff  - Maintain SBP goal 140-160 per NSGY   - PRN labetalol 10 mg, hydralazine 10 mg   - Continue ASA 81 mg daily  - Plavix cleared for post operative day 7 per NSGY, but do not plan to re-initiate Plavix s/p L CEA.   - DVT PPx: Q8hr subcutaneous heparin, SCDs  - Continue atorvastatin 80mg  - PT/OT recommend moderate intensity level of continued care upon discharge  - NSGY to schedule wound check post-op 2 weeks, carotid ultrasound 6 weeks.     # Severe PAD 2/2 Chronic infrarenal aortic occlusion  # HFpEF  # History of HTN, HLD  :: ECHO 11/12/24: LVEF 65-70%, LVH, severely dilated LA, negative bubble study  :: CT Aorti-ileofemoral w/ runoff: Extensive vascular disease including occlusion of the  infrarenal abdominal aorta, bilateral common iliac arteries, no flow appreciated left PT   Plan:  - Continue to monitor on telemetry  - BP goal: 140-160  --> weaned off of midodrine, norepinephrine  - PRN labetalol 10 mg, hydralazine 10 mg  - Vasc Sx signed off, per note: no acute intervention. Good flow to radial artery. Chronic infrarenal aortic occlusion. Continue to monitor doppler signals  - Vasc Sx to schedule outpatient clinic follow-up (next 4-8 weeks).     # Pain Management  # Hx closed fracture R femur s/p 09/2024 surgical fixation  - Start Tizanidine 2 mg PO nightly for RUE/RLE spasticity, increased muscle tone.   - Acetaminophen 650 PRN Q6hr  - Dilaudid  0.2 mg PRN Q4hr. Please attempt positional change and acetaminophen prior to dilaudid.    # COPD  Assessment:  - PFT (10/05/2023 at OSH): FEV1 50% predicted. FEV1/FVC 69%. No TLC obtained. DLCO 40%   Plan:  - Continuous pulse oximetry   - Incentive spirometry while awake  - Continue Trelegy Ellipta (change to inpatient formula)  - Wean O2, maintain SpO2 88-92%, NC PRN     #Chronic anemia:  :: 11/20 CBC Hgb 9.4, Hct 30.0, Plt 237. Baseline Hgb 11.6, Plt: 258  Plan:  - Continue to monitor with daily CBC     #T2DM:  :: A1c 5.8%  Plan:  - Hold home metformin  - Monitor for hypoglycemia:  - Accuchecks, sliding scale insulin AC, HS     #Hypothyroidism:  - Continue home levothyroxine     #Anxiety:  - Continue Buspar 10 mg TID     #JAIME, resolved  - Baseline BUN/Cr: 12/0.63  - 11/20 BUN/Cr: 12/0.46  Plan:  - Continue to monitor daily RFP  - replete fluids PRN  - Pike removed 11/20     F: None  E: Replace electrolytes as needed  N: Adult regular diet, minced/moist 5, thin 0  GI: Pantoprazole  DVT Ppx: SQH, SCDs  PIV access      Christian Phelps MD  Neurology, PGY-2

## 2024-11-22 VITALS
DIASTOLIC BLOOD PRESSURE: 78 MMHG | SYSTOLIC BLOOD PRESSURE: 146 MMHG | HEART RATE: 86 BPM | WEIGHT: 104.5 LBS | BODY MASS INDEX: 17.84 KG/M2 | TEMPERATURE: 97.3 F | HEIGHT: 64 IN | RESPIRATION RATE: 9 BRPM | OXYGEN SATURATION: 100 %

## 2024-11-22 LAB
ALBUMIN SERPL BCP-MCNC: 3.4 G/DL (ref 3.4–5)
ANION GAP SERPL CALC-SCNC: 15 MMOL/L (ref 10–20)
BASOPHILS # BLD AUTO: 0.04 X10*3/UL (ref 0–0.1)
BASOPHILS NFR BLD AUTO: 0.5 %
BUN SERPL-MCNC: 15 MG/DL (ref 6–23)
CALCIUM SERPL-MCNC: 9.1 MG/DL (ref 8.6–10.6)
CHLORIDE SERPL-SCNC: 103 MMOL/L (ref 98–107)
CO2 SERPL-SCNC: 26 MMOL/L (ref 21–32)
CREAT SERPL-MCNC: 0.57 MG/DL (ref 0.5–1.05)
EGFRCR SERPLBLD CKD-EPI 2021: >90 ML/MIN/1.73M*2
EOSINOPHIL # BLD AUTO: 0.25 X10*3/UL (ref 0–0.4)
EOSINOPHIL NFR BLD AUTO: 2.8 %
ERYTHROCYTE [DISTWIDTH] IN BLOOD BY AUTOMATED COUNT: 14.1 % (ref 11.5–14.5)
GLUCOSE BLD MANUAL STRIP-MCNC: 103 MG/DL (ref 74–99)
GLUCOSE SERPL-MCNC: 109 MG/DL (ref 74–99)
HCT VFR BLD AUTO: 30.7 % (ref 36–46)
HGB BLD-MCNC: 9.8 G/DL (ref 12–16)
IMM GRANULOCYTES # BLD AUTO: 0.04 X10*3/UL (ref 0–0.5)
IMM GRANULOCYTES NFR BLD AUTO: 0.5 % (ref 0–0.9)
LYMPHOCYTES # BLD AUTO: 1.28 X10*3/UL (ref 0.8–3)
LYMPHOCYTES NFR BLD AUTO: 14.5 %
MAGNESIUM SERPL-MCNC: 2.07 MG/DL (ref 1.6–2.4)
MCH RBC QN AUTO: 30.3 PG (ref 26–34)
MCHC RBC AUTO-ENTMCNC: 31.9 G/DL (ref 32–36)
MCV RBC AUTO: 95 FL (ref 80–100)
MONOCYTES # BLD AUTO: 0.99 X10*3/UL (ref 0.05–0.8)
MONOCYTES NFR BLD AUTO: 11.3 %
NEUTROPHILS # BLD AUTO: 6.2 X10*3/UL (ref 1.6–5.5)
NEUTROPHILS NFR BLD AUTO: 70.4 %
NRBC BLD-RTO: 0 /100 WBCS (ref 0–0)
PHOSPHATE SERPL-MCNC: 4 MG/DL (ref 2.5–4.9)
PLATELET # BLD AUTO: 198 X10*3/UL (ref 150–450)
POTASSIUM SERPL-SCNC: 4 MMOL/L (ref 3.5–5.3)
RBC # BLD AUTO: 3.23 X10*6/UL (ref 4–5.2)
SODIUM SERPL-SCNC: 140 MMOL/L (ref 136–145)
WBC # BLD AUTO: 8.8 X10*3/UL (ref 4.4–11.3)

## 2024-11-22 PROCEDURE — 82947 ASSAY GLUCOSE BLOOD QUANT: CPT

## 2024-11-22 PROCEDURE — 2500000004 HC RX 250 GENERAL PHARMACY W/ HCPCS (ALT 636 FOR OP/ED)

## 2024-11-22 PROCEDURE — 83735 ASSAY OF MAGNESIUM: CPT | Performed by: STUDENT IN AN ORGANIZED HEALTH CARE EDUCATION/TRAINING PROGRAM

## 2024-11-22 PROCEDURE — 2500000004 HC RX 250 GENERAL PHARMACY W/ HCPCS (ALT 636 FOR OP/ED): Performed by: STUDENT IN AN ORGANIZED HEALTH CARE EDUCATION/TRAINING PROGRAM

## 2024-11-22 PROCEDURE — 85025 COMPLETE CBC W/AUTO DIFF WBC: CPT | Performed by: STUDENT IN AN ORGANIZED HEALTH CARE EDUCATION/TRAINING PROGRAM

## 2024-11-22 PROCEDURE — 2500000001 HC RX 250 WO HCPCS SELF ADMINISTERED DRUGS (ALT 637 FOR MEDICARE OP): Performed by: STUDENT IN AN ORGANIZED HEALTH CARE EDUCATION/TRAINING PROGRAM

## 2024-11-22 PROCEDURE — 2500000005 HC RX 250 GENERAL PHARMACY W/O HCPCS: Performed by: STUDENT IN AN ORGANIZED HEALTH CARE EDUCATION/TRAINING PROGRAM

## 2024-11-22 PROCEDURE — 80069 RENAL FUNCTION PANEL: CPT | Performed by: STUDENT IN AN ORGANIZED HEALTH CARE EDUCATION/TRAINING PROGRAM

## 2024-11-22 PROCEDURE — 36415 COLL VENOUS BLD VENIPUNCTURE: CPT | Performed by: STUDENT IN AN ORGANIZED HEALTH CARE EDUCATION/TRAINING PROGRAM

## 2024-11-22 PROCEDURE — 2500000002 HC RX 250 W HCPCS SELF ADMINISTERED DRUGS (ALT 637 FOR MEDICARE OP, ALT 636 FOR OP/ED): Performed by: STUDENT IN AN ORGANIZED HEALTH CARE EDUCATION/TRAINING PROGRAM

## 2024-11-22 RX ORDER — TIZANIDINE 2 MG/1
2 TABLET ORAL NIGHTLY
Start: 2024-11-22

## 2024-11-22 RX ADMIN — LEVOTHYROXINE SODIUM 25 MCG: 25 TABLET ORAL at 06:12

## 2024-11-22 RX ADMIN — ACETAMINOPHEN 650 MG: 325 TABLET ORAL at 02:36

## 2024-11-22 RX ADMIN — HEPARIN SODIUM 5000 UNITS: 5000 INJECTION INTRAVENOUS; SUBCUTANEOUS at 00:02

## 2024-11-22 RX ADMIN — PANTOPRAZOLE SODIUM 40 MG: 40 INJECTION, POWDER, FOR SOLUTION INTRAVENOUS at 06:12

## 2024-11-22 RX ADMIN — HYDROMORPHONE HYDROCHLORIDE 0.2 MG: 1 INJECTION, SOLUTION INTRAMUSCULAR; INTRAVENOUS; SUBCUTANEOUS at 01:55

## 2024-11-22 RX ADMIN — AZITHROMYCIN DIHYDRATE 250 MG: 250 TABLET, FILM COATED ORAL at 09:04

## 2024-11-22 RX ADMIN — HYDROMORPHONE HYDROCHLORIDE 0.2 MG: 1 INJECTION, SOLUTION INTRAMUSCULAR; INTRAVENOUS; SUBCUTANEOUS at 06:26

## 2024-11-22 RX ADMIN — LABETALOL HYDROCHLORIDE 10 MG: 5 INJECTION, SOLUTION INTRAVENOUS at 04:46

## 2024-11-22 RX ADMIN — HYDRALAZINE HYDROCHLORIDE 10 MG: 20 INJECTION INTRAMUSCULAR; INTRAVENOUS at 05:22

## 2024-11-22 RX ADMIN — SENNOSIDES 17.2 MG: 8.6 TABLET, FILM COATED ORAL at 09:01

## 2024-11-22 RX ADMIN — BUSPIRONE HYDROCHLORIDE 10 MG: 10 TABLET ORAL at 09:00

## 2024-11-22 RX ADMIN — HEPARIN SODIUM 5000 UNITS: 5000 INJECTION INTRAVENOUS; SUBCUTANEOUS at 09:00

## 2024-11-22 RX ADMIN — LIDOCAINE 1 PATCH: 4 PATCH TOPICAL at 09:00

## 2024-11-22 RX ADMIN — POLYETHYLENE GLYCOL 3350 17 G: 17 POWDER, FOR SOLUTION ORAL at 09:01

## 2024-11-22 ASSESSMENT — COGNITIVE AND FUNCTIONAL STATUS - GENERAL
TURNING FROM BACK TO SIDE WHILE IN FLAT BAD: A LOT
EATING MEALS: A LOT
WALKING IN HOSPITAL ROOM: TOTAL
MOVING FROM LYING ON BACK TO SITTING ON SIDE OF FLAT BED WITH BEDRAILS: A LOT
CLIMB 3 TO 5 STEPS WITH RAILING: TOTAL
HELP NEEDED FOR BATHING: A LOT
DRESSING REGULAR LOWER BODY CLOTHING: A LITTLE
STANDING UP FROM CHAIR USING ARMS: TOTAL
MOVING TO AND FROM BED TO CHAIR: A LOT
DRESSING REGULAR UPPER BODY CLOTHING: A LOT
TOILETING: A LOT
DAILY ACTIVITIY SCORE: 13
MOBILITY SCORE: 9
PERSONAL GROOMING: A LOT

## 2024-11-22 ASSESSMENT — PAIN SCALES - GENERAL
PAINLEVEL_OUTOF10: 6
PAINLEVEL_OUTOF10: 0 - NO PAIN
PAINLEVEL_OUTOF10: 3
PAINLEVEL_OUTOF10: 0 - NO PAIN
PAINLEVEL_OUTOF10: 5 - MODERATE PAIN

## 2024-11-22 ASSESSMENT — PAIN DESCRIPTION - DESCRIPTORS: DESCRIPTORS: ACHING

## 2024-11-22 ASSESSMENT — PAIN - FUNCTIONAL ASSESSMENT
PAIN_FUNCTIONAL_ASSESSMENT: 0-10
PAIN_FUNCTIONAL_ASSESSMENT: UNABLE TO SELF-REPORT
PAIN_FUNCTIONAL_ASSESSMENT: 0-10
PAIN_FUNCTIONAL_ASSESSMENT: UNABLE TO SELF-REPORT
PAIN_FUNCTIONAL_ASSESSMENT: 0-10

## 2024-11-22 NOTE — PROGRESS NOTES
Social Work Discharge Planning note:    Insurance precert has been received and [facility] is ready to accept. Transport  time has been set for 10:00 am. Daughter updated and agreeable with discharge. Medical team updated.     Wily Pineda MSW, LSW

## 2024-11-23 NOTE — DISCHARGE SUMMARY
Discharge Diagnosis  Cerebrovascular accident (CVA) due to occlusion of left middle cerebral artery (Multi)    Problem List  Assessment & Plan  Cerebrovascular accident (CVA) due to occlusion of left middle cerebral artery (Multi)    Spasticity      Zulma Rao is a 79 y.o. female who presented to the hospital with right sided weakness. They were diagnosed with a stroke.  Etiology: Ischemic Stroke: Large artery atherosclerosis    Relevant hospital complications: Fluctuating blood pressure.   Discharge antithrombotics: ASA  Pending evaluation:     Issues Requiring Follow-Up  -Neurology   -Neurosurgery   -Vascular surgery   -increase amlodipine to 10 mg if no improvement of the 5 mg.     MR brain wo IV contrast    Result Date: 11/14/2024  Interpreted By:  Gage Keller  and Unruly Simpson STUDY: MR BRAIN WO IV CONTRAST;  11/14/2024 3:22 pm   INDICATION: Signs/Symptoms:Change in examination, prior stroke.     COMPARISON: MR brain 11/13/2024.   ACCESSION NUMBER(S): [id number]   ORDERING CLINICIAN: DARREN BOO   TECHNIQUE: Axial T2, FLAIR, DWI, gradient echo T2 and sagittal and coronal T1 weighted images of brain were acquired.   FINDINGS: CSF Spaces: Diffuse prominence of the ventricles and sulci is noted. The basal cisterns are clear. Appearance is unchanged since the recent MRI brain study.   Parenchyma: Compared to MRI 11/13/2024, interval increase in size of restricted diffusion with associated T2/FLAIR hyperintensity involving the left cerebral hemispheric white matter primarily within the white matter. Additional foci of restricted diffusion with associated T2/FLAIR hyperintensity are noted within the bilateral frontal lobes including the precentral gyri, left parietal lobe, left temporal lobe, and left insula as well as the left mesial temporal lobe. No striking hemorrhagic transformation or striking mass effect.   Few new small foci of restricted diffusion are seen, including within the left parietal  lobe gray-white matter junction (series 3 image 50 of 76) and a small focus (Series 3, Image 53) of restricted diffusion located within the left parietal lobe gray matter (series 3, image 53 of 76) was faintly seen on the prior MRI.   Encephalomalacia/gliosis is again noted in the posterior right occipital lobe. Chronic right thalamic infarct is again seen. Additional patchy areas of T2/FLAIR hyperintensity are noted within the deep, periventricular, and subcortical white matter bilaterally, likely related to chronic small vessel ischemic changes. Small old infarct in the right cerebellum is unchanged. There is no mass effect or midline shift.   Loss of flow void located within distal right cervical ICA and portions of the right intracranial ICA consistent with occlusion and better evaluated on the prior CTA head study.   Paranasal Sinuses and Mastoids: Visualized paranasal sinuses and mastoid air cells are unremarkable.       1. Evolving infarcts located within the left cerebral hemisphere, including in the watershed distribution, overall more pronounced in appearance but similar in extent with no hemorrhagic transformation or surrounding mass effect.   2. Additional evolving acute infarcts are seen located within the bilateral cerebral hemispheres and are stable to slightly more pronounced in appearance. Few new punctate acute infarcts located within the left cerebral hemisphere. There is no surrounding mass effect or hemorrhagic transformation.       I personally reviewed the images/study and I agree with the findings as stated by Tatiana Tolliver MD. This study was interpreted at [facility], [place], [place].   MACRO: None   Signed by: Gage Keller 11/14/2024 3:45 PM Dictation workstation:   [id number]    MR brain wo IV contrast    Result Date: 11/13/2024  Interpreted By:  Yimi George and Stephens Katherine STUDY: MR BRAIN WO IV CONTRAST;  11/13/2024 1:26 am    INDICATION: Signs/Symptoms:stroke burden eval. mute, R weak. stuttering sx.     COMPARISON: CT head same day   ACCESSION NUMBER(S): [id number]   ORDERING CLINICIAN: DARREN BOO   TECHNIQUE: Axial T2, FLAIR, DWI, gradient echo T2 and sagittal and coronal T1 weighted images of brain were acquired.   FINDINGS: CSF Spaces: The ventricles, sulci and basal cisterns are within normal limits for patient's age.   Parenchyma: Watershed distribution areas of T2/FLAIR hyperintense signal with associated diffusion restriction throughout the left cerebral hemisphere most pronounced in the superior left frontal lobe but also involving the parietal and medial temporal lobes. Finding is consistent with acute/subacute watershed infarction.   Cortical foci of diffusion restriction in the left frontal lobe cortex along the interhemispheric aspect, in the left sylvian fissure and posterior left insula and in the left posterior temporal lobe. These findings are consistent with non watershed distribution   2.5 cm focus of encephalomalacia within the right occipital lobe consistent with previous infarction in the distribution of the right posterior cerebral artery. Focus of subacute/chronic lacunar infarction in the right thalamus measuring a proximally 5 mm x 15 mm in size.. Focus of susceptibility artifact within the left basal ganglia favored to correspond to mineralization seen on prior CT. Nonspecific patchy T2/FLAIR hyperintense signal within the periventricular and subcortical white matter, likely sequela of chronic microangiopathy.   There is no mass effect or midline shift.   Paranasal Sinuses and Mastoids: Visualized paranasal sinuses and mastoid air cells are unremarkable.   Note is made of postsurgical changes from bilateral lens replacements.       1. Acute/subacute watershed ischemia throughout the left cerebral hemisphere 2. Subcentimeter cortical infarctions involving the left superior frontal lobe but also involving the  left parietal and temporal lobes. No evidence of hemorrhagic transformation. 3. Nonspecific white matter changes which can be seen in the setting of chronic microangiopathy.   I personally reviewed the images/study and I agree with Lo Santo DO's (radiology resident) findings as stated. This study was interpreted at [facility], [place], [place].   MACRO: Critical Finding:  See findings. Notification was initiated on 11/13/2024 at 1:38 am by  Lo Santo.  (**-OCF-**)   Signed by: Yimi George 11/13/2024 7:24 AM Dictation workstation:   [id number]   CT head wo IV contrast    Result Date: 11/20/2024  Interpreted By:  Juan Belle and Beyersdorf Conner STUDY: CT HEAD WO IV CONTRAST;  11/20/2024 5:08 am   INDICATION: Signs/Symptoms:exam change, rule out bleed   COMPARISON: CT head 11/13/2024, MR brain 11/14/2024   ACCESSION NUMBER(S): [id number]   ORDERING CLINICIAN: SUHAIL AMARAL   TECHNIQUE: Axial noncontrast CT images of head with coronal and sagittal reconstructed images.   FINDINGS: CT HEAD:   BRAIN PARENCHYMA: Continued evolution of the acute to subacute ischemic infarcts in the left parasagittal frontal and parietal lobes in the left DAKOTA and watershed distributions, which appear more conspicuous and hypodense compared to the prior CT head. Encephalomalacia in the right thalamus and right occipital lobe consistent with remote ischemic events. No evidence of acute intraparenchymal hemorrhage. No mass-effect, midline shift or effacement of cerebral sulci. Gray-white matter distinction is otherwise preserved.   VENTRICLES and EXTRA-AXIAL SPACES:  No acute extra-axial or intraventricular hemorrhage. Ventricles and sulci are age-concordant.   PARANASAL SINUSES/MASTOIDS:  No hemorrhage or air-fluid levels within the visualized paranasal sinuses. The mastoid air cells are well-aerated.   CALVARIUM/ORBITS:  No skull fracture.  The orbits and globes are  intact to the extent visualized.   EXTRACRANIAL SOFT TISSUES: No discernible abnormality.         1. Continued evolution of the acute to subacute ischemic infarct affecting the left cerebral hemisphere predominantly in the left parasagittal frontal lobe. No evidence to suggest hemorrhagic transformation. 2. Focal encephalomalacia in the right occipital lobe. Mild microangiopathic disease and diffuse parenchymal volume loss.   I personally reviewed the image(s)/study and resident interpretation. I agree with the findings as stated by resident Jarad Benton. Data analyzed and images interpreted at [facility], [place], [place].   MACRO: none   Signed by: Juan Belle 11/20/2024 8:30 AM Dictation workstation:   [id number]    CT head wo IV contrast    Result Date: 11/12/2024  Interpreted By:  Latesha Zuniga,  Hector Nichols STUDY: CT HEAD WO IV CONTRAST; CT ANGIO HEAD AND NECK W AND WO IV CONTRAST; 11/12/2024 5:34 am   INDICATION: Signs/Symptoms:stroke burden eval; Signs/Symptoms:eval for rethrombosis/stenosis of L MCA - mute, r weak. pressure dependent   COMPARISON: CT head 11/11/2024   ACCESSION NUMBER(S): [id number]; [id number]   ORDERING CLINICIAN: DARREN BOO   TECHNIQUE: Multiple contiguous axial noncontrast images of the head were obtained. Following IV contrast administration of iodinated contrast, a CT angiography of the head and neck was performed. MIPS and 3D reconstructions of the [place] of Jean and neck were created on an independent workstation and reviewed.   FINDINGS: NON-CONTRAST HEAD CT:   BRAIN PARENCHYMA:  No evidence of acute intraparenchymal hemorrhage or parenchymal evidence of an acute large territory ischemic infarct. No mass-effect, midline shift or effacement of cerebral sulci. Gray-white matter distinction is preserved. Punctate calcifications within the left-greater-than-right basal ganglia. Lacunar within the posterior limb of the  right internal capsule and left basal ganglia. Small amount of encephalomalacia and gliosis within the right occipital lobe.   VENTRICLES and EXTRA-AXIAL SPACES:  No acute extra-axial or intraventricular hemorrhage. Ventricles and sulci are age-concordant.   PARANASAL SINUSES/MASTOIDS:  No hemorrhage or air-fluid levels within the visualized paranasal sinuses. The mastoids are well aerated.   CALVARIUM/ORBITS:  No skull fracture.  The orbits and globes are intact to the extent visualized.   EXTRACRANIAL SOFT TISSUES: No discernible abnormality.       CTA NECK:   There is calcified and noncalcified plaque along the aortic arch and origins of the great vessels. There is marked narrowing at the origin of the right common carotid artery and right subclavian artery. There is moderate narrowing secondary to noncalcified plaque at the origin of the left subclavian artery.   LEFT VERTEBRAL ARTERY:  No hemodynamically significant stenosis, occlusion, or dissection. The left vertebral artery is dominant.   LEFT COMMON/INTERNAL CAROTID ARTERY:  There is calcified plaque along the course of the common carotid artery with mild-to-moderate narrowing. There is coarse calcification at the carotid bifurcation and along the proximal cervical internal carotid artery with a proximally 65% stenosis by NASCET criteria. The cervical internal carotid artery is patent to the level of the skull base without additional stenosis.   RIGHT VERTEBRAL ARTERY: There is coarse atherosclerotic calcification at the origin of the right vertebral artery. No hemodynamically significant stenosis, occlusion, or dissection.   RIGHT COMMON/INTERNAL CAROTID ARTERY:  There is occlusion of the right common carotid artery just beyond the origin. There is occlusion of the right cervical internal carotid artery.   Limited images through the lung apices demonstrate emphysematous changes. Mild degenerative changes of the cervical spine without evidence of high-grade  spinal canal stenosis. Soft tissues of the neck are unremarkable.       CTA HEAD:   ANTERIOR CIRCULATION: No aneurysm.   - Internal Carotid Arteries: There is occlusion of the right petrous and cavernous internal carotid artery. There is reconstitution within the supraclinoid segment. The left intracranial carotid artery is patent. There is atherosclerotic calcification along the cavernous and supraclinoid ICA with mild narrowing.   - Middle Cerebral Arteries: No hemodynamically significant stenosis or occlusion.   - Anterior Cerebral Arteries: There is and abrupt cutoff of the distal left anterior cerebral artery (series 504, image 82). The right DAKOTA is without hemodynamically significant stenosis or occlusion.     POSTERIOR CIRCULATION: No aneurysm.   - Intracranial Vertebral Arteries:  No hemodynamically significant stenosis or occlusion.   - Basilar Artery:  No hemodynamically significant stenosis or occlusion.   - Posterior Cerebral Arteries:  No hemodynamically significant stenosis or occlusion.       CTA neck:   Complete occlusion of the right common carotid artery and right cervical internal carotid artery.   Coarse atherosclerotic calcification at the left carotid bifurcation with approximately 65% stenosis by NASCET criteria.   The vertebral arteries are patent. The left vertebral artery is dominant. There is a coarse calcification at the origin of the right vertebral artery.   Calcified and noncalcified plaque along the aortic arch and origins of the great vessels with narrowing.   CTA head:   There is an abrupt cutoff of the distal left anterior cerebral artery.   Occlusion of the right petrous and cavernous carotid arteries with reconstitution in the supraclinoid segment.   The left intracranial carotid artery is patent with coarse atherosclerotic calcification and mild narrowing.   The posterior circulation is patent without significant stenosis.   CT head:   No acute intracranial hemorrhage or mass  effect.   I personally reviewed the images/study and I agree with the findings as stated by Simone Joy MD (Radiology Resident).   MACRO: Simone Joy discussed the significance and urgency of this critical finding by telephone with  Jeaneth Graves MD on 11/12/2024 at 5:59 am.  (**-RCF-**) Findings:  See findings.   .   Signed by: Latesha Zuniga 11/12/2024 7:20 AM Dictation workstation:   [id number]    CT head wo IV contrast    Result Date: 11/12/2024  Interpreted By:  Latesha Zuniga,  and Ernesto Nichols STUDY: CT HEAD WO IV CONTRAST;  11/11/2024 9:03 pm   INDICATION: Signs/Symptoms:aphasic, not following commands on R.     COMPARISON: None.   ACCESSION NUMBER(S): [id number]   ORDERING CLINICIAN: LUDWIG SMITH   TECHNIQUE: Noncontrast axial CT scan of head was performed. Angled reformats in brain and bone windows were generated. The images were reviewed in bone, brain, blood and soft tissue windows.   FINDINGS: CSF Spaces: Ventricles, cortical sulci and basal cisterns are prominent reflecting age related involutional changes and mild volume loss. Mildly prominent extra-axial CSF space within the bifrontal region may represent asymmetric volume loss. There is no extraaxial fluid collection.   Parenchyma: There is no acute intraparenchymal hemorrhage or parenchymal evidence of acute large territorial ischemic infarction. Patchy white matter hypodensity likely represents microangiopathy. Lucency within the right basal ganglia likely represents a lacunar infarct. Small area of encephalomalacia and gliosis within the right occipital lobe. Of note multiple intracranial vessels are hyperdense, likely secondary to recent IR neuro angiogram. The grey-white differentiation is intact. There is no mass effect or midline shift.   Calvarium: The calvarium is unremarkable.   Paranasal sinuses and mastoids: Visualized paranasal sinuses and mastoids are clear.       No acute  intracranial hemorrhage or mass effect.   Small focal area of encephalomalacia and gliosis within the right occipital lobe.   Mild white matter changes compatible with microangiopathy. Lacunar infarct within the right basal ganglia.   I personally reviewed the images/study and I agree with the findings as stated by Simone Joy MD (Radiology Resident).   MACRO: None   Signed by: Latesha Zuniga 11/12/2024 7:03 AM Dictation workstation:   [id number]   Transthoracic Echo (TTE) Complete    Result Date: 11/13/2024   [facility] (formerly [facility])[3], [address] [place] [address]                Tel [phone] and Fax [phone] TRANSTHORACIC ECHOCARDIOGRAM REPORT  Patient Name:       WAYLON Armendariz Physician:    [id number] Huong Panda MD Study Date:         11/12/2024          Ordering Provider:    [id number] DARREN BOO MRN/PID:            [id number]            Fellow: Accession#:         [id number]        Nurse: Date of Birth/Age:  1945 / 79     Sonographer:          Ventura rowe                                     JESUS Gender assigned at  F                   Additional Staff: Birth: Height:             162.56 cm           Admit Date: Weight:             49.44 kg            Admission Status:     Inpatient -                                                               Routine BSA / BMI:          1.51 m2 / 18.71     Encounter#:           [id number]                     kg/m2 Blood Pressure:     163/66 mmHg         Department Location:  [facility] Study Type:    TRANSTHORACIC ECHO (TTE) COMPLETE Diagnosis/ICD: Cerebral infarction due to unspecified occlusion or stenosis of                left middle cerebral artery-I63.512 Indication:    ischemic stroke CPT Code:      Echo Complete w Full Doppler-[id number] Patient History: Pertinent History: CVA, stroke, HLD, HFpEF, HTN. Study Detail: The following  Echo studies were performed: M-Mode, 2D, Doppler and               color flow. Technically challenging study due to body habitus,               patient lying in supine position, poor acoustic windows, prominent               lung artifact, the patient's lack of cooperation and patient               sensitivity to probe. Definity used as a contrast agent for               endocardial border definition and agitated saline used as a               contrast agent for intraseptal flow evaluation. Total contrast               used for this procedure was 2.0 mL via IV push.  PHYSICIAN INTERPRETATION: Left Ventricle: Left ventricular ejection fraction is normal, by visual estimate at 65-70%. There are no regional left ventricular wall motion abnormalities. The left ventricular cavity size is decreased. There is mildly increased septal and mildly increased posterior left ventricular wall thickness. There is left ventricular concentric remodeling. Spectral Doppler shows an abnormal pattern of left ventricular diastolic filling. There is no definite left ventricular thrombus visualized. Left Atrium: The left atrium is severely dilated. A bubble study using agitated saline was performed. Bubble study is negative. Agitated saline contrast study was negative for intracardiac shunt. Right Ventricle: The right ventricle is normal in size. There is normal right ventricular global systolic function. Right Atrium: The right atrium is normal in size. Aortic Valve: The aortic valve is trileaflet. There is no evidence of aortic valve regurgitation. The peak instantaneous gradient of the aortic valve is 8 mmHg. Mitral Valve: The mitral valve is normal in structure. The peak instantaneous gradient of the mitral valve is 12 mmHg. There is trace to mild mitral valve regurgitation. Tricuspid Valve: The tricuspid valve is structurally normal. There is trace tricuspid regurgitation. The right ventricular systolic pressure is unable to be  estimated. Pulmonic Valve: The pulmonic valve is structurally normal. There is physiologic pulmonic valve regurgitation. Pericardium: Trivial pericardial effusion. Aorta: The aortic root is normal. The aortic root appears normal in size and measures 2.90 cm. Systemic Veins: The inferior vena cava appears normal in size, with IVC inspiratory collapse greater than 50%. In comparison to the previous echocardiogram(s): There are no prior studies on this patient for comparison purposes. No prior echocardiogram available for comparison.  CONCLUSIONS:  1. Left ventricular ejection fraction is normal, by visual estimate at 65-70%.  2. Spectral Doppler shows an abnormal pattern of left ventricular diastolic filling.  3. Left ventricular cavity size is decreased.  4. No left ventricular thrombus visualized.  5. There is normal right ventricular global systolic function.  6. The left atrium is severely dilated.  7. Agitated saline contrast study was negative for intracardiac shunt.  8. Normal aortic root.  9. No prior echocardiogram available for comparison. RECOMMENDATIONS: Utilizing an FDA cleared automated machine learning algorithm ([employer] Heart Failure by [employer]), the analysis of the apical 4-chamber echocardiogram suggests the presence of heart failure with preserved ejection fraction (HFpEF)*. Clinical correlation looking for additional heart failure signs and symptoms is recommended, as a definite diagnosis of heart failure cannot be made by imaging alone. *Per ACC/AHA/HFSA universal diagnosis of heart failure, HFpEF is defined as 1) signs and symptoms leading to clinical diagnosis of heart failure, 2) an ejection fraction of at least 50%, and 3) evidence of elevated intra-cardiac filling pressures by echocardiography, BNP elevation, or catheterization.  QUANTITATIVE DATA SUMMARY:  2D MEASUREMENTS:          Normal Ranges: Ao Root d:       2.90 cm  (2.0-3.7cm) LAs:             3.30 cm  (2.7-4.0cm) IVSd:             1.00 cm  (0.6-1.1cm) LVPWd:           0.90 cm  (0.6-1.1cm) LVIDd:           3.50 cm  (3.9-5.9cm) LVIDs:           2.30 cm LV Mass Index:   63 g/m2 LVEDV Index:     33 ml/m2 LV % FS          34.3 %  LA VOLUME:                    Normal Ranges: LA Vol A4C:        71.3 ml    (22+/-6mL/m2) LA Vol A2C:        73.5 ml LA Vol BP:         72.5 ml LA Vol Index A4C:  47.2ml/m2 LA Vol Index A2C:  48.6 ml/m2 LA Vol Index BP:   48.0 ml/m2 LA Area A4C:       23.4 cm2 LA Area A2C:       23.8 cm2 LA Major Axis A4C: 6.5 cm LA Major Axis A2C: 6.6 cm LA Volume Index:   48.0 ml/m2  AORTA MEASUREMENTS:         Normal Ranges: Ao Sinus, d:        2.90 cm (2.1-3.5cm) Asc Ao, d:          2.90 cm (2.1-3.4cm)  LV SYSTOLIC FUNCTION BY 2D PLANIMETRY (MOD):                      Normal Ranges: EF-A4C View:    79 % (>=55%) EF-A2C View:    86 % EF-Biplane:     83 % EF-Visual:      68 % LV EF Reported: 68 %  LV DIASTOLIC FUNCTION:             Normal Ranges: MV Peak E:             1.31 m/s    (0.7-1.2 m/s) MV Peak A:             0.66 m/s    (0.42-0.7 m/s) E/A Ratio:             2.00        (1.0-2.2) MV e'                  0.061 m/s   (>8.0) MV lateral e'          0.07 m/s MV medial e'           0.06 m/s MV A Dur:              114.00 msec E/e' Ratio:            21.32       (<8.0) MV DT:                 162 msec    (150-240 msec)  MITRAL VALVE:           Normal Ranges: MV Vmax:      1.72 m/s  (<=1.3m/s) MV peak P.8 mmHg (<5mmHg) MV mean P.0 mmHg  (<2mmHg) MV DT:        162 msec  (150-240msec)  AORTIC VALVE:           Normal Ranges: AoV Vmax:      1.45 m/s (<=1.7m/s) AoV Peak P.4 mmHg (<20mmHg) LVOT Max Rai:  0.88 m/s (<=1.1m/s) LVOT VTI:      17.90 cm LVOT Diameter: 1.80 cm  (1.8-2.4cm) AoV Area,Vmax: 1.46 cm2 (2.5-4.5cm2)  RIGHT VENTRICLE: RV Basal 2.70 cm TAPSE:   20.4 mm RV s'    0.13 m/s  TRICUSPID VALVE/RVSP:         Normal Ranges: IVC Diam:             1.50 cm  PULMONIC VALVE:          Normal Ranges: PV Max Rai:     1.0 m/s   (0.6-0.9m/s) PV Max P.4 mmHg  [id number] Huong Panda MD Electronically signed on 2024 at 3:33:45 PM  ** Final **          BNP   Date/Time Value Ref Range Status   2024 05:57  (H) 0 - 99 pg/mL Final       Test Results Pending At Discharge  Pending Labs       Order Current Status    Calcium, ionized Collected (24 0822)    Surgical Pathology Exam In process            Hospital Course  Hospital Course    Ms. Rao is an 80 yo female with history of stroke in 2024 HTN, HLD, R CEA , HFpEF, COPD, anxiety, partial hepatectomy for HCC , mRS 0, transferred to [facility] from [facility] 2024 for acute left MCA infarct with NIHSS 21, s/p TNK at OSH. NIH on arrival 21. Underwent angiogram for mechanical thrombectomy complicated by chronic infrarenal aortic occlusion, but found complete recanalization secondary to TNK. Angiogram complicated by severe diffuse atherosclerosis of vasculature, requiring multiple entry attempts/passes. Access gained via right radial artery. Post-angiogram NIHSS 20 and patient was admitted to NSU, initiated on DAPT 24 hours after TNK administration.    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 Patient had blood pressure dependent examinations. BP goal was increased to 180-200 mmHg, requiring vasopressors and midodrine. Patient had deterioration in her neurological exam  overnight with right hemiparesis, gaze preference (corresponding NIH=10) with suspected contributory SBP decrease to ~150. Work up EEG negative for seizure. Repeat MRI () showed increased infarct in the watershed area. SBP goal of 180-200 was maintained with vasopressors. Left CCA ICA CEA was performed with neurosurgery  for continued necessity of vasopressor support, BP dependant exams, and complete R ICA  occlusion. Plavix held prior to procedure. Procedure was complicated by some reduction of brainwave monitoring during suturing, but returned to baseline after completion of procedure.     Patient's neurological exam remained stable at NIHSS 3 s/p L CEA. She was weaned off of vasopressor support, received a Cardene drip post-operatively, downgraded to ROXANE status with BP managed by PRN labetalol and hydralazine. Plavix was not restarted in the setting of completed L CEA. She was noted to have an neurological exam change overnight 11/20/2024, although CT head non-contrast did not demonstrate acute intraparenchymal bleed, and exam returned to baseline by morning.     Of note, this patient experienced intermittent bilateral hip pain (R>L) and other bilateral extremity pain throughout her hospital course. This may be attributed in part to her 09/2024 R hip surgical fixation, which she reports at baseline; also consider vascular claudication in the setting of chronic infrarenal aortic occlusion/diffuse atherosclerosis. Regarding outpatient follow-up, the patient will be seen by vascular surgery in 4-8 weeks for chronic infrarenal aortic occlusion; neurosurgery for wound check in 2 weeks and carotid ultrasound in 6 weeks.     Stroke work up  - CT head w/o contrast (11/20): No acute intraparenchymal hemorrhage.  - MRI (11/14): Interval worsening of L DAKOTA-MCA watershed infarct stroke burden.  - MRI (11/13): Acute/subacute watershed ischemia throughout the left cerebral  Hemisphere, subcentimeter cortical infarctions within L superior  frontal lobe, L parietal and temporal lobes.  - CTA (11/12): Proximal left M1 occlusion with chronic right ICA occlusion; L ICA 65% stenosis by NASCET criteria  - EKG (11/14): NSR with premature supraventricular complexes  - TTE (11/12): LVEF 65-70%, LVH, severely dilated LA, negative bubble study   - LDL (11/11) 92, A1c (11/11) 5.8  - BNP (11/11) 188, Trop 83        Home Medications      Medication List      START taking these medications     amLODIPine 10 mg tablet; Commonly known as: Norvasc; Take 0.5 tablets (5   mg) by mouth once daily.   * tiZANidine 2 mg tablet; Commonly known as: Zanaflex; Take 1 tablet (2   mg) by mouth once daily at bedtime.   * tiZANidine 2 mg tablet; Commonly known as: Zanaflex; Take 1 tablet (2   mg) by mouth once daily at bedtime.  * This list has 2 medication(s) that are the same as other medications   prescribed for you. Read the directions carefully, and ask your doctor or   other care provider to review them with you.     CONTINUE taking these medications     acetaminophen 650 mg ER tablet; Commonly known as: Tylenol 8 HOUR   aspirin 81 mg EC tablet   atorvastatin 80 mg tablet; Commonly known as: Lipitor   azithromycin 250 mg tablet; Commonly known as: Zithromax   busPIRone 10 mg tablet; Commonly known as: Buspar   cloNIDine 0.1 mg tablet; Commonly known as: Catapres   ergocalciferol 1.25 MG (22731 UT) capsule; Commonly known as: Vitamin   D-2   furosemide 20 mg tablet; Commonly known as: Lasix   ipratropium-albuteroL 0.5-2.5 mg/3 mL nebulizer solution; Commonly known   as: Duo-Neb   levothyroxine 25 mcg tablet; Commonly known as: Synthroid, Levoxyl   melatonin 3 mg tablet   pantoprazole 40 mg EC tablet; Commonly known as: ProtoNix   Trelegy Ellipta 100-62.5-25 mcg blister with device; Generic drug:   fluticasone-umeclidin-vilanter     STOP taking these medications     clopidogrel 75 mg tablet; Commonly known as: Plavix       Pertinent Physical Exam At Time of Discharge  Physical Exam  Neurological Exam    Mental Status   Oriented to person, place, time and situation. Speech is normal. Language is fluent with no aphasia.     Cranial Nerves  CN II: Right normal visual field. Left normal visual field.  CN III, IV, VI: Extraocular movements intact bilaterally. Normal lids and orbits bilaterally.   Right pupil: 3 mm. Round. Reactive to light. Reactive to  accommodation. (History of cataract surgery)  Left pupil: 4 mm. Round. Abnormal reactivity: Sluggish. Reactive to accommodation.  Relative afferent pupillary defect absent.  CN V:  Right: Facial sensation is normal.  Left: Facial sensation is normal on the left.  CN VII: Full and symmetric facial movement.  CN VIII: Hearing grossly intact to conversation.  CN IX, X: Palate elevates symmetrically  CN XI:  Right: Sternocleidomastoid strength is normal. Trapezius strength is weak. 1/5 strength on shoulder shrug .  Left: Sternocleidomastoid strength is normal. Trapezius strength is normal.  CN XII: Tongue midline without atrophy or fasciculations.     Minimal anisocoria noted on exam, L pupillary constriction sluggish to light. Exam was performed with brighter room lights today compared to prior days. Note history of unspecified R eye surgery 2011, B/L cataract surgeries 2000. .     Motor  Normal muscle bulk throughout. No fasciculations present. Normal muscle tone. Flexor tone increased proximal RUE. No abnormal involuntary movements. No pronator drift. Right hemiparesis. Proximal RUE and RLE.  RUE: Proximally antigravity 3/5,  strength 4+/5 distally .  RLE: 4+/5 proximally on hip extension and distally with plantar & dorsiflexion. Endorsed R hip pain with movement.    LUE: Proximally 5/5 on shoulder abduction, elbow flexion/extension. Distally 5/5 with  strength.  LLE: 5/5 proximally on hip extension; distally 5/5 with plantar & dorsiflexion. Minimal L hip pain with movement.   .     Sensory  Light touch is normal in upper and lower extremities.   Sensation to light touch symmetric bilaterally. .     Reflexes                                            Right                      Left  Brachioradialis                    3+                         3+  Biceps                                 3+                         3+  Triceps                                3+                         3+  Patellar                                 3+                         3+  Achilles                                3+                         3+  Right Plantar: downgoing  Left Plantar: downgoing     Coordination   Left: Finger-to-nose normal.  Unable to assess R ringer to nose due to limited RUE proximal strength .      Gait   Deferred for patient safety     Outpatient Follow-Up  Future Appointments   Date Time Provider Department Center   12/2/2024  1:30 PM MARCUS Nicole-CNP [id number]   12/31/2024 11:00 AM [facility] 6 [facility]   1/2/2025  2:00 PM Gil Dos Santos MD [id number] [facility]       Christian Phelps MD  Neurology, PGY-2

## 2024-12-02 LAB
LABORATORY COMMENT REPORT: NORMAL
PATH REPORT.FINAL DX SPEC: NORMAL
PATH REPORT.GROSS SPEC: NORMAL
PATH REPORT.RELEVANT HX SPEC: NORMAL
PATH REPORT.TOTAL CANCER: NORMAL

## 2024-12-03 LAB
ATRIAL RATE: 106 BPM
P AXIS: 78 DEGREES
P OFFSET: 189 MS
P ONSET: 125 MS
PR INTERVAL: 186 MS
Q ONSET: 218 MS
QRS COUNT: 17 BEATS
QRS DURATION: 76 MS
QT INTERVAL: 352 MS
QTC CALCULATION(BAZETT): 467 MS
QTC FREDERICIA: 425 MS
R AXIS: 28 DEGREES
T AXIS: 74 DEGREES
T OFFSET: 394 MS
VENTRICULAR RATE: 106 BPM

## 2024-12-04 NOTE — DOCUMENTATION CLARIFICATION NOTE
"    PATIENT:               WAYLON STORM  ACCT #:                  [id number]  MRN:                       [id number]  :                       1945  ADMIT DATE:       2024 1:45 PM  DISCH DATE:        2024 10:51 AM  RESPONDING PROVIDER #:        [id number]          PROVIDER RESPONSE TEXT:    Intracranial hemorrhage ruled in for this admission    CDI QUERY TEXT:    Clarification    Instruction:    Based on your assessment of the patient and the clinical information, please provide the requested documentation by clicking on the appropriate radio button and enter any additional information if prompted.    Question: Please further clarify the diagnosis of ICH as    When answering this query, please exercise your independent professional judgment. The fact that a question is being asked, does not imply that any particular answer is desired or expected.    The patient's clinical indicators include:  Clinical Information:  Patient is an 79 year old female who was admitted on 2024 with Cerebrovascular accident (CVA) due to occlusion of left middle cerebral artery.  She initially presented to an OSH and was transferred to [facility] for possible mechanical thrombectomy.    Clinical Indicators:  -From the PN on 11/15, \" Given permissive HTN and increase in SBP goals with use of vasopressors, ctm for intracranial hemorrhage, remain NSU status. \"    -From the CT of head on , \" No evidence of acute intraparenchymal hemorrhage or parenchymal evidence of an acute large territory ischemic infarct \"    -From the MRI of brain on , \" No evidence of hemorrhagic transformation \"    -From the MRI of the brain on , \" Evolving infarcts located within the left cerebral hemisphere, including in the watershed distribution, overall more pronounced in appearance but similar in extent with no hemorrhagic transformation  or surrounding mass effect \" and \" Additional evolving acute infarcts are seen located within " "the bilateral cerebral hemispheres and are stable to slightly more pronounced in appearance. Few new punctate acute infarcts located within the left cerebral hemisphere. There is no surrounding mass  effect or hemorrhagic transformation \"    Treatment:  Repeat CT of head and MRI of brain, left carotid endarterectomy, Cardene 40mg IV drip, Aspirin 81mg tab PO daily, Atorvastatin 80 mg tab PO daily, Plavix 75mg tab  PO daily, Hydralazine 10mg IVP PRN per parameters, Labetalol 10mg IVP PRN per parameters and Labetalol 5mg IVP PRN per parameters, Midodrine 5mg tab PO TID    Risk Factors:  Cerebral infarct due to occlusion of L MCA, HTN, HF, occlusion of left carotid artery needing endarterectomy, stenosis of right carotid artery, dysarthria, hemiplegia/hemiparesis  Options provided:  -- Intracranial hemorrhage ruled out after workup  -- Intracranial hemorrhage ruled in for this admission  -- Other - I will add my own diagnosis  -- Refer to Clinical Documentation Reviewer    Query created by: Laura Virk on 12/4/2024 6:11 AM      Electronically signed by:  CARMELO RAMOS MD 12/4/2024 2:01 PM          "

## 2024-12-04 NOTE — DOCUMENTATION CLARIFICATION NOTE
"    PATIENT:               WAYLON STORM  ACCT #:                  [id number]  MRN:                       [id number]  :                       1945  ADMIT DATE:       2024 1:45 PM  DISCH DATE:        2024 10:51 AM  RESPONDING PROVIDER #:        [id number]          PROVIDER RESPONSE TEXT:    History of NSTEMI    CDI QUERY TEXT:    Clarification    Instruction:    Based on your assessment of the patient and the clinical information, please provide the requested documentation by clicking on the appropriate radio button and enter any additional information if prompted.    Question: Based on your medical judgment, can you please clarify which of these conditions is the most clinically supported    When answering this query, please exercise your independent professional judgment. The fact that a question is being asked, does not imply that any particular answer is desired or expected.    The patient's clinical indicators include:  Clinical Information: There is conflicting documentation in the medical record which requires clarification.    -The diagnosis of \" History of NSTEMI/HFpEF \" was documented in the History and Physical    -The diagnosis of \" NSTEMI \" on  in the Progress Note    Clinical Indicators:  -From the ECG on , \" Atrial fibrillation with rapid ventricular response. Marked ST abnormality, possible inferior subendocardial injury. Abnormal ECG \"    -From the ECG on , \" Sinus rhythm with Premature supraventricular complexes. Prolonged QT. Abnormal ECG. When compared with ECG of 20:05, sinus rhythm has replaced Atrial flutter. ST no longer depressed in Inferior leads. ST no longer depressed in Anterior leads \"    -From the Cardiology consult on , the patient \" has no active cardiac symptoms \" and \" there are no cardiac contraindications to planned procedure. No further cardiac work up warranted. Cardiology will sign off \"    Treatment:  Cardiology consult, TTE, follow " up ECGs, Aspirin 81mg tab PO daily, Atorvastatin 80 mg tab PO daily, Plavix 75mg tab  PO daily, telemetry    Risk Factors:  Abnormal ECG, need for Cardiology consult, ICH, CHF, DM2, HLD, occlusion/stenosis of bilateral carotid arteries, HTN  Options provided:  -- NSTEMI  -- History of NSTEMI  -- Other - I will add my own diagnosis  -- Refer to Clinical Documentation Reviewer    Query created by: Laura Virk on 12/4/2024 6:42 AM      Electronically signed by:  CARMELO RAMOS MD 12/4/2024 2:01 PM

## 2024-12-06 LAB
ATRIAL RATE: 76 BPM
P AXIS: 90 DEGREES
P OFFSET: 196 MS
P ONSET: 144 MS
PR INTERVAL: 154 MS
Q ONSET: 221 MS
QRS COUNT: 12 BEATS
QRS DURATION: 78 MS
QT INTERVAL: 418 MS
QTC CALCULATION(BAZETT): 470 MS
QTC FREDERICIA: 452 MS
R AXIS: 47 DEGREES
T AXIS: 72 DEGREES
T OFFSET: 430 MS
VENTRICULAR RATE: 76 BPM

## 2025-01-02 ENCOUNTER — APPOINTMENT (OUTPATIENT)
Dept: VASCULAR MEDICINE | Facility: CLINIC | Age: 80
End: 2025-01-02
Payer: COMMERCIAL

## (undated) DEVICE — SUTURE, SILK, 2-0, 18 IN, BLACK

## (undated) DEVICE — ELECTRODE, CORKSCREW NEEDLE 1.5M LENGTH

## (undated) DEVICE — ADHESIVE, SKIN, DERMABOND ADVANCED, 15CM, PEN-STYLE

## (undated) DEVICE — DRAPE, MICROSCOPE, FOR ZEISS 65MM, VARI-LENS II, 52 X 150

## (undated) DEVICE — DRESSING, NON-ADHERENT, TELFA, OUCHLESS, 3 X 8 IN, STERILE

## (undated) DEVICE — MANIFOLD, 4 PORT NEPTUNE STANDARD

## (undated) DEVICE — SUTURE, PROLENE, 6-0, 30 IN, BV-1 BV-1

## (undated) DEVICE — COVER, TABLE, UHC

## (undated) DEVICE — APPLICATOR, PREP, CHLORAPREP, W/ORANGE TINT, 10.5ML

## (undated) DEVICE — PROBE, DOPPLER, NEUROSURGERY, DISPOSABLE

## (undated) DEVICE — SPONGE GAUZE, XRAY SC+RFID, 4X4 16 PLY, STERILE

## (undated) DEVICE — NEEDLE, ELECTRODE, SUBDERMAL, PAIRED, 2.0 LEAD, DISP

## (undated) DEVICE — ELECTRODE, GROUND PLATE

## (undated) DEVICE — LOOP, VESSEL, MAXI, RED

## (undated) DEVICE — PAD, GROUNDING, ELECTROSURGICAL, W/9 FT CABLE, POLYHESIVE II, ADULT, LF

## (undated) DEVICE — SUTURE, PROLENE, 6-0, 24 IN, BV-1, BLUE

## (undated) DEVICE — TUBING, SUCTION, MICROSURGICAL, MICROVAC, 5 FR

## (undated) DEVICE — MARKER, SKIN, RULER AND LABEL PACK, CUSTOM

## (undated) DEVICE — CATHETER TRAY, SURESTEP, 16FR, URINE METER W/STATLOCK

## (undated) DEVICE — SPONGE, HEMOSTATIC, CELLULOSE, SURGICEL, 2 X 14 IN

## (undated) DEVICE — SPONGE, LAP, XRAY DECT, SC+RFID, 12X12, STERILE

## (undated) DEVICE — Device

## (undated) DEVICE — COVER, CART, 45 X 27 X 48 IN, CLEAR

## (undated) DEVICE — SUTURE, VICRYL, 4-0, 18 IN, UNDYED BR PS-2

## (undated) DEVICE — SUTURE, SURGIPRO, 4-0, 30 IN, C16, BLUE

## (undated) DEVICE — GOWN, SURGICAL, SMARTGOWN, XLARGE, STERILE

## (undated) DEVICE — SUTURE, VICRYL, 3-0,18 IN, SH, UNDYED

## (undated) DEVICE — BOWL, BASIN, 32 OZ, STERILE

## (undated) DEVICE — SUTURE, SILK, 3-0, 18 IN, MULTIPACK, BLACK

## (undated) DEVICE — DRAPE, INCISE, DIRECTIONAL, FENESTRATED, PEDIATRIC, STERILE

## (undated) DEVICE — DRAIN, WOUND, ROUND, W/TROCAR, HOLE PATTERN, 10 IN, MEDIUM, 1/8 X 49 IN

## (undated) DEVICE — BOOT, SUTURE-AID, YELLOW, STERILE, LF

## (undated) DEVICE — CONTAINER, SPECIMEN, 120 ML, STERILE

## (undated) DEVICE — EVACUATOR, WOUND, SUCTION, CLOSED, JACKSON-PRATT, 100 CC, SILICONE

## (undated) DEVICE — SPONGE, HEMOSTAT, SURGICEL FIBRILLAR, ABS, 4 X 4, LF

## (undated) DEVICE — BAG, DECANTER

## (undated) DEVICE — TUBING, SUCTION, CONNECTING, STERILE 0.25 X 120 IN., LF

## (undated) DEVICE — TUBING, SMOKE EVAC, 3/8 X 10 FT

## (undated) DEVICE — REST, HEAD, BAGEL, 9 IN